# Patient Record
Sex: MALE | Race: WHITE | NOT HISPANIC OR LATINO | Employment: OTHER | URBAN - NONMETROPOLITAN AREA
[De-identification: names, ages, dates, MRNs, and addresses within clinical notes are randomized per-mention and may not be internally consistent; named-entity substitution may affect disease eponyms.]

---

## 2017-01-09 ENCOUNTER — CLINICAL SUPPORT (OUTPATIENT)
Dept: UROLOGY | Facility: CLINIC | Age: 67
End: 2017-01-09

## 2017-01-09 DIAGNOSIS — R79.89 LOW TESTOSTERONE: Primary | ICD-10-CM

## 2017-01-09 DIAGNOSIS — E29.1 HYPOGONADISM IN MALE: ICD-10-CM

## 2017-01-09 PROCEDURE — 96372 THER/PROPH/DIAG INJ SC/IM: CPT | Performed by: UROLOGY

## 2017-01-09 RX ORDER — TESTOSTERONE CYPIONATE 200 MG/ML
400 INJECTION, SOLUTION INTRAMUSCULAR ONCE
Status: COMPLETED | OUTPATIENT
Start: 2017-01-09 | End: 2017-01-09

## 2017-01-09 RX ADMIN — TESTOSTERONE CYPIONATE 400 MG: 200 INJECTION, SOLUTION INTRAMUSCULAR at 09:15

## 2017-01-30 ENCOUNTER — CLINICAL SUPPORT (OUTPATIENT)
Dept: UROLOGY | Facility: CLINIC | Age: 67
End: 2017-01-30

## 2017-01-30 DIAGNOSIS — R79.89 LOW TESTOSTERONE: Primary | ICD-10-CM

## 2017-01-30 DIAGNOSIS — E29.1 HYPOGONADISM IN MALE: ICD-10-CM

## 2017-01-30 PROCEDURE — 96372 THER/PROPH/DIAG INJ SC/IM: CPT | Performed by: UROLOGY

## 2017-01-30 RX ORDER — TESTOSTERONE CYPIONATE 200 MG/ML
400 INJECTION, SOLUTION INTRAMUSCULAR ONCE
Status: COMPLETED | OUTPATIENT
Start: 2017-01-30 | End: 2017-01-30

## 2017-01-30 RX ADMIN — TESTOSTERONE CYPIONATE 400 MG: 200 INJECTION, SOLUTION INTRAMUSCULAR at 11:14

## 2017-02-20 ENCOUNTER — CLINICAL SUPPORT (OUTPATIENT)
Dept: UROLOGY | Facility: CLINIC | Age: 67
End: 2017-02-20

## 2017-02-20 DIAGNOSIS — E29.1 HYPOGONADISM IN MALE: ICD-10-CM

## 2017-02-20 DIAGNOSIS — R79.89 LOW TESTOSTERONE: Primary | ICD-10-CM

## 2017-02-20 PROCEDURE — 96372 THER/PROPH/DIAG INJ SC/IM: CPT | Performed by: UROLOGY

## 2017-02-20 RX ORDER — TESTOSTERONE CYPIONATE 200 MG/ML
400 INJECTION, SOLUTION INTRAMUSCULAR ONCE
Status: COMPLETED | OUTPATIENT
Start: 2017-02-20 | End: 2017-02-20

## 2017-02-20 RX ADMIN — TESTOSTERONE CYPIONATE 400 MG: 200 INJECTION, SOLUTION INTRAMUSCULAR at 09:23

## 2017-03-13 ENCOUNTER — CLINICAL SUPPORT (OUTPATIENT)
Dept: UROLOGY | Facility: CLINIC | Age: 67
End: 2017-03-13

## 2017-03-13 DIAGNOSIS — E29.1 HYPOGONADISM IN MALE: ICD-10-CM

## 2017-03-13 DIAGNOSIS — R79.89 LOW TESTOSTERONE: Primary | ICD-10-CM

## 2017-03-13 PROCEDURE — 96372 THER/PROPH/DIAG INJ SC/IM: CPT | Performed by: UROLOGY

## 2017-03-13 RX ORDER — TESTOSTERONE CYPIONATE 200 MG/ML
400 INJECTION, SOLUTION INTRAMUSCULAR ONCE
Status: COMPLETED | OUTPATIENT
Start: 2017-03-13 | End: 2017-03-13

## 2017-03-13 RX ADMIN — TESTOSTERONE CYPIONATE 400 MG: 200 INJECTION, SOLUTION INTRAMUSCULAR at 08:47

## 2017-04-05 ENCOUNTER — PROCEDURE VISIT (OUTPATIENT)
Dept: UROLOGY | Facility: CLINIC | Age: 67
End: 2017-04-05

## 2017-04-05 DIAGNOSIS — E29.1 HYPOGONADISM IN MALE: ICD-10-CM

## 2017-04-05 DIAGNOSIS — R79.89 LOW TESTOSTERONE: Primary | ICD-10-CM

## 2017-04-05 PROCEDURE — 99213 OFFICE O/P EST LOW 20 MIN: CPT | Performed by: UROLOGY

## 2017-04-05 RX ORDER — HYDROCHLOROTHIAZIDE 12.5 MG/1
12.5 CAPSULE, GELATIN COATED ORAL EVERY MORNING
COMMUNITY
Start: 2017-03-14

## 2017-04-05 RX ORDER — VENLAFAXINE HYDROCHLORIDE 150 MG/1
150 CAPSULE, EXTENDED RELEASE ORAL DAILY
COMMUNITY
Start: 2017-01-14 | End: 2021-12-27

## 2017-04-05 RX ORDER — FLUTICASONE PROPIONATE 50 MCG
SPRAY, SUSPENSION (ML) NASAL
COMMUNITY
Start: 2017-02-17 | End: 2017-08-22

## 2017-04-05 RX ORDER — MONTELUKAST SODIUM 10 MG/1
10 TABLET ORAL NIGHTLY
COMMUNITY
Start: 2017-02-21 | End: 2022-09-06 | Stop reason: SDUPTHER

## 2017-04-05 RX ORDER — MINOCYCLINE HYDROCHLORIDE 100 MG/1
CAPSULE ORAL
COMMUNITY
Start: 2017-03-14 | End: 2017-08-22

## 2017-04-05 RX ORDER — OMEPRAZOLE 20 MG/1
20 CAPSULE, DELAYED RELEASE ORAL DAILY
COMMUNITY
Start: 2017-01-14 | End: 2022-03-15

## 2017-04-05 RX ORDER — TRIAMCINOLONE ACETONIDE 1 MG/G
CREAM TOPICAL
COMMUNITY
Start: 2017-03-14 | End: 2017-08-22

## 2017-04-05 RX ORDER — SIMVASTATIN 20 MG
20 TABLET ORAL NIGHTLY
COMMUNITY
Start: 2017-01-14

## 2017-04-05 NOTE — PROGRESS NOTES
Chief Complaint  Hypogonadism (low testo, fup possible testopel implant.)        SIDDHARTHA Jerry is a 66 y.o. male who turns today for follow-up primarily of hypogonadism and low testosterone level.  He's been having a great clinical benefit from supplement and is anxious to continue.  He has 3 months shy of 5 years after prostatectomy for prostate cancer and a recent PSA was 0.  I suggested this because he has had problems with polycythemia but cannot be a blood donor intill 5 years after his cancer was resolved.  He's also had a problems with estrogen levels being elevated but hasn't taken Arimidex recently.    He is also concerned with erectile dysfunction stating that his intracorporeal injection with Trimix is becoming less effective and is requesting we increase the strength to 30 2 and 30.    There were no vitals filed for this visit.    Past Medical History  Past Medical History:   Diagnosis Date   • Erectile dysfunction    • Hypertension    • Impotence    • Malignant neoplasm of prostate    • Testicular hypofunction        Past Surgical History  Past Surgical History:   Procedure Laterality Date   • APPENDECTOMY     • HEAD/NECK LESION/CYST EXCISION Right 10/31/2016    Procedure: EXCISE BCCA RIGHT SCALP;  Surgeon: Lowell Rider MD;  Location: [facility];  Service:    • PROSTATE SURGERY         Medications  has a current medication list which includes the following prescription(s): aspirin, fluticasone, hydrochlorothiazide, lisinopril, minocycline, montelukast, nortriptyline, omeprazole, simvastatin, simvastatin, tadalafil, triamcinolone, venlafaxine, and venlafaxine xr.      Allergies  No Known Allergies    Social History  Social History     Social History Narrative       Family History  He has no family history of bladder or kidney cancer  He has no family history of kidney stones      AUA Symptom Score:      Review of Systems  Review of Systems   Constitutional: Negative.    Genitourinary: Negative.    All  other systems reviewed and are negative.      Physical Exam  Physical Exam    Labs Recent and today in the office:  Results for orders placed or performed in visit on 11/28/16   POC Urinalysis Dipstick, Automated   Result Value Ref Range    Color Yellow Yellow, Straw, Dark Yellow, Bridgett    Clarity, UA Clear Clear    Glucose, UA Negative Negative mg/dL    Bilirubin Negative Negative    Ketones, UA Trace (A) Negative    Specific Gravity  1.025 1.005 - 1.030    Blood, UA Negative Negative    pH, Urine 6.0 5.0 - 8.0    Protein, POC Negative Negative mg/dL    Urobilinogen, UA Normal Normal    Leukocytes Trace (A) Negative    Nitrite, UA Negative Negative       Testopel Subcutaneous hormone pellet implantation:    The patient was placed on the exam table in the supine position.  The upper outer quadrant of the hip was identified for insertion.  The area was then prepped with Betadine and injected with 7ml of Lidocaine 2% with Epinephrine to anesthetize superficially and then distally along the trocar tract.  A 3mm incision was made using #11 blade scalpel.  The trocar with a sharp-ended stylet was inserted into the subcutaneous tissue in line with the femur.  The sharp stylet was withdrawn and the Testopel pellets were placed into the well of the trocar.  Testopel was advanced into the tissue using blunt-ended stylet.  A total of 12 pellets were inserted. For the J-code of  the NDC # for the pellets is 18945-488-74. The trocar was removed and the incision was closed using Steri-strips.  The wound area was cleansed to remove the Betadine and was covered with Steri-strips with an outer Band-aid.  Two subcutaneous tract were developed.  Careful inspection of the area was done.          Assessment & Plan  The patient was informed of the post-procedure instructions.  He will return in 4 months to determine scheduling of the next insertion.    For therapeutic phlebotomy and will need to be contacted for Arimidex if his  estrogen level is high.

## 2017-04-06 LAB — ESTRADIOL SERPL-MCNC: 15.2 PG/ML (ref 7.6–42.6)

## 2017-08-17 ENCOUNTER — LAB (OUTPATIENT)
Dept: UROLOGY | Facility: CLINIC | Age: 67
End: 2017-08-17

## 2017-08-17 DIAGNOSIS — E29.1 HYPOGONADISM, MALE: Primary | ICD-10-CM

## 2017-08-18 LAB
BASOPHILS # BLD AUTO: 0.01 10*3/MM3 (ref 0–0.2)
BASOPHILS NFR BLD AUTO: 0.2 % (ref 0–2.5)
EOSINOPHIL # BLD AUTO: 0.11 10*3/MM3 (ref 0–0.7)
EOSINOPHIL NFR BLD AUTO: 2.5 % (ref 0–7)
ERYTHROCYTE [DISTWIDTH] IN BLOOD BY AUTOMATED COUNT: 13.8 % (ref 11.5–14.5)
ESTRADIOL SERPL-MCNC: 11 PG/ML (ref 7.6–42.6)
HCT VFR BLD AUTO: 51.5 % (ref 42–52)
HGB BLD-MCNC: 17 G/DL (ref 14–18)
IMM GRANULOCYTES # BLD: 0.02 10*3/MM3 (ref 0–0.06)
IMM GRANULOCYTES NFR BLD: 0.5 % (ref 0–0.6)
LYMPHOCYTES # BLD AUTO: 1.47 10*3/MM3 (ref 0.6–3.4)
LYMPHOCYTES NFR BLD AUTO: 33.2 % (ref 10–50)
MCH RBC QN AUTO: 28.7 PG (ref 27–31)
MCHC RBC AUTO-ENTMCNC: 33 G/DL (ref 30–37)
MCV RBC AUTO: 87 FL (ref 80–94)
MONOCYTES # BLD AUTO: 0.34 10*3/MM3 (ref 0–0.9)
MONOCYTES NFR BLD AUTO: 7.7 % (ref 0–12)
NEUTROPHILS # BLD AUTO: 2.48 10*3/MM3 (ref 2–6.9)
NEUTROPHILS NFR BLD AUTO: 55.9 % (ref 37–80)
NRBC BLD AUTO-RTO: 0 /100 WBC (ref 0–0)
PLATELET # BLD AUTO: 194 10*3/MM3 (ref 130–400)
RBC # BLD AUTO: 5.92 10*6/MM3 (ref 4.7–6.1)
TESTOST SERPL-MCNC: 158 NG/DL (ref 264–916)
WBC # BLD AUTO: 4.43 10*3/MM3 (ref 4.8–10.8)

## 2017-08-22 ENCOUNTER — OFFICE VISIT (OUTPATIENT)
Dept: UROLOGY | Facility: CLINIC | Age: 67
End: 2017-08-22

## 2017-08-22 VITALS — OXYGEN SATURATION: 99 % | WEIGHT: 200 LBS | HEIGHT: 73 IN | BODY MASS INDEX: 26.51 KG/M2

## 2017-08-22 DIAGNOSIS — E29.1 HYPOGONADISM IN MALE: ICD-10-CM

## 2017-08-22 DIAGNOSIS — Z85.46 PERSONAL HISTORY OF PROSTATE CANCER: Primary | ICD-10-CM

## 2017-08-22 LAB
BILIRUB BLD-MCNC: NEGATIVE MG/DL
CLARITY, POC: CLEAR
COLOR UR: YELLOW
GLUCOSE UR STRIP-MCNC: NEGATIVE MG/DL
KETONES UR QL: NEGATIVE
LEUKOCYTE EST, POC: NEGATIVE
NITRITE UR-MCNC: NEGATIVE MG/ML
PH UR: 6 [PH] (ref 5–8)
PROT UR STRIP-MCNC: NEGATIVE MG/DL
PSA SERPL-MCNC: <0.064 NG/ML (ref 0.06–4)
RBC # UR STRIP: NEGATIVE /UL
SP GR UR: 1.03 (ref 1–1.03)
UROBILINOGEN UR QL: NORMAL

## 2017-08-22 PROCEDURE — 81003 URINALYSIS AUTO W/O SCOPE: CPT | Performed by: UROLOGY

## 2017-08-22 PROCEDURE — 11980 IMPLANT HORMONE PELLET(S): CPT | Performed by: UROLOGY

## 2017-08-22 PROCEDURE — 99213 OFFICE O/P EST LOW 20 MIN: CPT | Performed by: UROLOGY

## 2017-08-22 RX ORDER — CLOBETASOL PROPIONATE 0.46 MG/ML
SOLUTION TOPICAL
COMMUNITY
Start: 2017-07-17 | End: 2017-08-22

## 2017-08-22 RX ORDER — AMLODIPINE BESYLATE AND BENAZEPRIL HYDROCHLORIDE 10; 40 MG/1; MG/1
1 CAPSULE ORAL DAILY
COMMUNITY
Start: 2017-06-16

## 2017-08-22 RX ORDER — NORTRIPTYLINE HYDROCHLORIDE 10 MG/1
30 CAPSULE ORAL NIGHTLY
COMMUNITY
Start: 2017-06-06

## 2017-08-22 NOTE — PROGRESS NOTES
Chief Complaint  Hypogonadism (4 month with labs, possible testopel.) and Prostate Cancer (history of prostate cancer.)        SIDDHARTHA Jerry is a 67 y.o. male who returns today for follow-up of hypogonadism and low testosterone level.  He has achieved great clinical benefit from supplement in terms of strength stamina attitude and libido.  He has a past history of prostate cancer treated years ago but with no evidence of recurrence as judged by PSA near 0.  All of his blood work looks good including estradiol CBC and testosterone level indicating it should be minimal risk to continue.  His PSA did not get done so that will be sent today.    Vitals:    08/22/17 1542   SpO2: 99%       Past Medical History  Past Medical History:   Diagnosis Date   • Erectile dysfunction    • Hypertension    • Impotence    • Malignant neoplasm of prostate    • Testicular hypofunction        Past Surgical History  Past Surgical History:   Procedure Laterality Date   • APPENDECTOMY     • HEAD/NECK LESION/CYST EXCISION Right 10/31/2016    Procedure: EXCISE BCCA RIGHT SCALP;  Surgeon: Lowell Rider MD;  Location: [facility];  Service:    • PROSTATE SURGERY         Medications  has a current medication list which includes the following prescription(s): amlodipine-benazepril, aspirin, clobetasol, fluticasone, hydrochlorothiazide, lisinopril, minocycline, montelukast, nortriptyline, omeprazole, simvastatin, tadalafil, triamcinolone, and venlafaxine xr.      Allergies  No Known Allergies    Social History  Social History     Social History Narrative       Family History  He has no family history of bladder or kidney cancer  He has no family history of kidney stones      AUA Symptom Score:      Review of Systems  Review of Systems    Physical Exam  Physical Exam   Constitutional: He is oriented to person, place, and time. He appears well-developed and well-nourished.   HENT:   Head: Normocephalic and atraumatic.   Neck: Normal range of motion.    Pulmonary/Chest: Effort normal. No respiratory distress.   Abdominal: Soft. He exhibits no distension and no mass. There is no tenderness. No hernia.   Musculoskeletal: Normal range of motion.   Lymphadenopathy:     He has no cervical adenopathy.   Neurological: He is alert and oriented to person, place, and time.   Skin: Skin is warm and dry.   Psychiatric: He has a normal mood and affect. His behavior is normal.   Vitals reviewed.      Labs Recent and today in the office:  Results for orders placed or performed in visit on 08/22/17   POC Urinalysis Dipstick, Automated   Result Value Ref Range    Color Yellow Yellow, Straw, Dark Yellow, Bridgett    Clarity, UA Clear Clear    Glucose, UA Negative Negative, 1000 mg/dL (3+) mg/dL    Bilirubin Negative Negative    Ketones, UA Negative Negative    Specific Gravity  1.030 1.005 - 1.030    Blood, UA Negative Negative    pH, Urine 6.0 5.0 - 8.0    Protein, POC Negative Negative mg/dL    Urobilinogen, UA Normal Normal    Leukocytes Negative Negative    Nitrite, UA Negative Negative     Testopel Subcutaneous hormone pellet implantation:    The patient was placed on the exam table in the supine position.  The upper outer quadrant of the hip was identified for insertion.  The area was then prepped with Betadine and injected with 7ml of Lidocaine 2% with Epinephrine to anesthetize superficially and then distally along the trocar tract.  A 3mm incision was made using #11 blade scalpel.  The trocar with a sharp-ended stylet was inserted into the subcutaneous tissue in line with the femur.  The sharp stylet was withdrawn and the Testopel pellets were placed into the well of the trocar.  Testopel was advanced into the tissue using blunt-ended stylet.  A total of 12 pellets were inserted. For the J-code of  the NDC # for the pellets is 65969-331-73. The trocar was removed and the incision was closed using Steri-strips.  The wound area was cleansed to remove the Betadine and was  covered with Steri-strips with an outer Band-aid.  Two subcutaneous tract were developed.  Careful inspection of the area was done.      The patient was informed of the post-procedure instructions.  He will return in 1 month to monitor testosterone levels and then again at 4 months to determine scheduling of the next insertion.          Assessment & Plan  He'll return in 4 months with blood work to consider continuing treatment.

## 2017-12-28 ENCOUNTER — LAB (OUTPATIENT)
Dept: UROLOGY | Facility: CLINIC | Age: 67
End: 2017-12-28

## 2017-12-28 DIAGNOSIS — R35.1 BPH ASSOCIATED WITH NOCTURIA: ICD-10-CM

## 2017-12-28 DIAGNOSIS — E29.1 TESTICULAR HYPOGONADISM: Primary | ICD-10-CM

## 2017-12-28 DIAGNOSIS — N40.1 BPH ASSOCIATED WITH NOCTURIA: ICD-10-CM

## 2017-12-29 LAB
BASOPHILS # BLD AUTO: 0.01 10*3/MM3 (ref 0–0.2)
BASOPHILS NFR BLD AUTO: 0.2 % (ref 0–2.5)
EOSINOPHIL # BLD AUTO: 0.07 10*3/MM3 (ref 0–0.7)
EOSINOPHIL NFR BLD AUTO: 1.4 % (ref 0–7)
ERYTHROCYTE [DISTWIDTH] IN BLOOD BY AUTOMATED COUNT: 13.1 % (ref 11.5–14.5)
ESTRADIOL SERPL-MCNC: 10.6 PG/ML (ref 7.6–42.6)
HCT VFR BLD AUTO: 51.5 % (ref 42–52)
HGB BLD-MCNC: 16.8 G/DL (ref 14–18)
IMM GRANULOCYTES # BLD: 0.02 10*3/MM3 (ref 0–0.06)
IMM GRANULOCYTES NFR BLD: 0.4 % (ref 0–0.6)
LYMPHOCYTES # BLD AUTO: 1.46 10*3/MM3 (ref 0.6–3.4)
LYMPHOCYTES NFR BLD AUTO: 29.2 % (ref 10–50)
MCH RBC QN AUTO: 27.9 PG (ref 27–31)
MCHC RBC AUTO-ENTMCNC: 32.6 G/DL (ref 30–37)
MCV RBC AUTO: 85.5 FL (ref 80–94)
MONOCYTES # BLD AUTO: 0.5 10*3/MM3 (ref 0–0.9)
MONOCYTES NFR BLD AUTO: 10 % (ref 0–12)
NEUTROPHILS # BLD AUTO: 2.94 10*3/MM3 (ref 2–6.9)
NEUTROPHILS NFR BLD AUTO: 58.8 % (ref 37–80)
NRBC BLD AUTO-RTO: 0 /100 WBC (ref 0–0)
PLATELET # BLD AUTO: 188 10*3/MM3 (ref 130–400)
PSA SERPL-MCNC: <0.064 NG/ML (ref 0.06–4)
RBC # BLD AUTO: 6.02 10*6/MM3 (ref 4.7–6.1)
TESTOST SERPL-MCNC: 171 NG/DL (ref 264–916)
WBC # BLD AUTO: 5 10*3/MM3 (ref 4.8–10.8)

## 2018-01-04 ENCOUNTER — LAB (OUTPATIENT)
Dept: LAB | Facility: HOSPITAL | Age: 68
End: 2018-01-04
Attending: UROLOGY

## 2018-01-04 ENCOUNTER — OFFICE VISIT (OUTPATIENT)
Dept: UROLOGY | Facility: CLINIC | Age: 68
End: 2018-01-04

## 2018-01-04 VITALS
HEART RATE: 76 BPM | OXYGEN SATURATION: 98 % | HEIGHT: 73 IN | WEIGHT: 200 LBS | BODY MASS INDEX: 26.51 KG/M2 | DIASTOLIC BLOOD PRESSURE: 74 MMHG | TEMPERATURE: 98.2 F | SYSTOLIC BLOOD PRESSURE: 135 MMHG

## 2018-01-04 DIAGNOSIS — E29.1 HYPOGONADISM MALE: Primary | ICD-10-CM

## 2018-01-04 DIAGNOSIS — D75.1 POLYCYTHEMIA, SECONDARY: Primary | ICD-10-CM

## 2018-01-04 DIAGNOSIS — D75.1 POLYCYTHEMIA: ICD-10-CM

## 2018-01-04 LAB
DEPRECATED RDW RBC AUTO: 40 FL (ref 37–54)
ERYTHROCYTE [DISTWIDTH] IN BLOOD BY AUTOMATED COUNT: 13.1 % (ref 11.5–14.5)
HCT VFR BLD AUTO: 49.1 % (ref 42–52)
HGB BLD-MCNC: 16.4 G/DL (ref 14–18)
MCH RBC QN AUTO: 28.2 PG (ref 27–31)
MCHC RBC AUTO-ENTMCNC: 33.4 G/DL (ref 30–37)
MCV RBC AUTO: 84.5 FL (ref 80–94)
PLATELET # BLD AUTO: 188 10*3/MM3 (ref 130–400)
PMV BLD AUTO: 10.6 FL (ref 6–12)
POST-BLOOD PRESSURE: NORMAL
PRE-BLOOD PRESSURE: NORMAL
PRE-HCT: 49.1
PRE-HGB: 16.4
PULSE: 81
RBC # BLD AUTO: 5.81 10*6/MM3 (ref 4.7–6.1)
VOLUME COLLECTED: 500
WBC NRBC COR # BLD: 6.92 10*3/MM3 (ref 4.8–10.8)

## 2018-01-04 PROCEDURE — 11980 IMPLANT HORMONE PELLET(S): CPT | Performed by: UROLOGY

## 2018-01-04 PROCEDURE — 85027 COMPLETE CBC AUTOMATED: CPT

## 2018-01-04 PROCEDURE — 99195 PHLEBOTOMY: CPT | Performed by: UROLOGY

## 2018-01-04 PROCEDURE — 36415 COLL VENOUS BLD VENIPUNCTURE: CPT

## 2018-01-04 PROCEDURE — 99214 OFFICE O/P EST MOD 30 MIN: CPT | Performed by: UROLOGY

## 2018-01-04 RX ORDER — DOXYCYCLINE 100 MG/1
100 CAPSULE ORAL DAILY
COMMUNITY
Start: 2017-12-08 | End: 2019-03-21

## 2018-01-04 NOTE — PROGRESS NOTES
Chief Complaint  Hypogonadism (testopel)        SIDDHARTHA Jerry is a 67 y.o. male who returns today for follow-up of his hypogonadism and low testosterone level.  He has achieved great clinical benefit in terms of strength and stamina and is anxious to continue the treatments.  He has a past history of prostate cancer but returns today 5 years after therapy once again with a PSA near 0.  He has noticed a drop off in his energy level recently and blood work indicates his testosterone is back down to 100 4 months after insertion of 12 pellets.  Estradiol looks good but hematocrit is 51.    Vitals:    01/04/18 0846   BP: 135/74   Pulse: 76   Temp: 98.2 °F (36.8 °C)   SpO2: 98%       Past Medical History  Past Medical History:   Diagnosis Date   • Erectile dysfunction    • Hypertension    • Impotence    • Malignant neoplasm of prostate    • Testicular hypofunction        Past Surgical History  Past Surgical History:   Procedure Laterality Date   • APPENDECTOMY     • HEAD/NECK LESION/CYST EXCISION Right 10/31/2016    Procedure: EXCISE BCCA RIGHT SCALP;  Surgeon: Lowell Rider MD;  Location: [facility];  Service:    • PROSTATE SURGERY         Medications  has a current medication list which includes the following prescription(s): amlodipine-benazepril, aspirin, doxycycline, hydrochlorothiazide, lisinopril, montelukast, nortriptyline, omeprazole, simvastatin, and venlafaxine xr.      Allergies  No Known Allergies    Social History  Social History     Social History Narrative       Family History  He has no family history of bladder or kidney cancer  He has no family history of kidney stones      AUA Symptom Score:      Review of Systems  Review of Systems   Constitutional: Positive for fatigue.   Genitourinary: Negative.    All other systems reviewed and are negative.      Physical Exam  Physical Exam    Labs Recent and today in the office:  Results for orders placed or performed in visit on 12/28/17   Testosterone    Result Value Ref Range    Testosterone, Total 171 (L) 264 - 916 ng/dL   Estradiol   Result Value Ref Range    Estradiol 10.6 7.6 - 42.6 pg/mL   PSA DIAGNOSTIC   Result Value Ref Range    PSA <0.064 0.060 - 4.000 ng/mL   CBC & Differential   Result Value Ref Range    WBC 5.00 4.80 - 10.80 10*3/mm3    RBC 6.02 4.70 - 6.10 10*6/mm3    Hemoglobin 16.8 14.0 - 18.0 g/dL    Hematocrit 51.5 42.0 - 52.0 %    MCV 85.5 80.0 - 94.0 fL    MCH 27.9 27.0 - 31.0 pg    MCHC 32.6 30.0 - 37.0 g/dL    RDW 13.1 11.5 - 14.5 %    Platelets 188 130 - 400 10*3/mm3    Neutrophil Rel % 58.8 37.0 - 80.0 %    Lymphocyte Rel % 29.2 10.0 - 50.0 %    Monocyte Rel % 10.0 0.0 - 12.0 %    Eosinophil Rel % 1.4 0.0 - 7.0 %    Basophil Rel % 0.2 0.0 - 2.5 %    Neutrophils Absolute 2.94 2.00 - 6.90 10*3/mm3    Lymphocytes Absolute 1.46 0.60 - 3.40 10*3/mm3    Monocytes Absolute 0.50 0.00 - 0.90 10*3/mm3    Eosinophils Absolute 0.07 0.00 - 0.70 10*3/mm3    Basophils Absolute 0.01 0.00 - 0.20 10*3/mm3    Immature Granulocyte Rel % 0.4 0.0 - 0.6 %    Immature Grans Absolute 0.02 0.00 - 0.06 10*3/mm3    nRBC 0.0 0.0 - 0.0 /100 WBC     Testopel Subcutaneous hormone pellet implantation:    The patient was placed on the exam table in the supine position.  The upper outer quadrant of the hip was identified for insertion.  The area was then prepped with Betadine and injected with 7ml of Lidocaine 2% with Epinephrine to anesthetize superficially and then distally along the trocar tract.  A 3mm incision was made using #11 blade scalpel.  The trocar with a sharp-ended stylet was inserted into the subcutaneous tissue in line with the femur.  The sharp stylet was withdrawn and the Testopel pellets were placed into the well of the trocar.  Testopel was advanced into the tissue using blunt-ended stylet.  A total of 12 pellets were inserted. For the J-code of  the NDC # for the pellets is 36715-420-65. The trocar was removed and the incision was closed using  Steri-strips.  The wound area was cleansed to remove the Betadine and was covered with Steri-strips with an outer Band-aid.  Two subcutaneous tract were developed.  Careful inspection of the area was done.            Assessment & Plan  Hypogonadism: 12 pellets are inserted and he is sent for therapeutic phlebotomy because of his hematocrit.  The patient was informed of the post-procedure instructions.  He will return in 1 month to monitor testosterone levels and then again at 4 months to determine scheduling of the next insertion.

## 2018-05-11 ENCOUNTER — PROCEDURE VISIT (OUTPATIENT)
Dept: UROLOGY | Facility: CLINIC | Age: 68
End: 2018-05-11

## 2018-05-11 VITALS
BODY MASS INDEX: 26.51 KG/M2 | TEMPERATURE: 98.5 F | HEIGHT: 73 IN | WEIGHT: 200 LBS | OXYGEN SATURATION: 99 % | HEART RATE: 81 BPM

## 2018-05-11 DIAGNOSIS — C61 MALIGNANT NEOPLASM OF PROSTATE (HCC): ICD-10-CM

## 2018-05-11 DIAGNOSIS — E29.1 HYPOGONADISM IN MALE: ICD-10-CM

## 2018-05-11 DIAGNOSIS — D75.1 POLYCYTHEMIA: ICD-10-CM

## 2018-05-11 DIAGNOSIS — E28.0 ESTRADIOL EXCESS: ICD-10-CM

## 2018-05-11 DIAGNOSIS — R79.89 LOW TESTOSTERONE: Primary | ICD-10-CM

## 2018-05-11 LAB
BILIRUB BLD-MCNC: NEGATIVE MG/DL
CLARITY, POC: CLEAR
COLOR UR: YELLOW
GLUCOSE UR STRIP-MCNC: NEGATIVE MG/DL
KETONES UR QL: NEGATIVE
LEUKOCYTE EST, POC: NEGATIVE
NITRITE UR-MCNC: NEGATIVE MG/ML
PH UR: 6 [PH] (ref 5–8)
PROT UR STRIP-MCNC: NEGATIVE MG/DL
RBC # UR STRIP: NEGATIVE /UL
SP GR UR: 1.01 (ref 1–1.03)
UROBILINOGEN UR QL: NORMAL

## 2018-05-11 PROCEDURE — 99214 OFFICE O/P EST MOD 30 MIN: CPT | Performed by: UROLOGY

## 2018-05-11 PROCEDURE — 81003 URINALYSIS AUTO W/O SCOPE: CPT | Performed by: UROLOGY

## 2018-05-16 ENCOUNTER — PROCEDURE VISIT (OUTPATIENT)
Dept: UROLOGY | Facility: CLINIC | Age: 68
End: 2018-05-16

## 2018-05-16 DIAGNOSIS — E29.1 HYPOGONADISM MALE: Primary | ICD-10-CM

## 2018-05-16 PROCEDURE — 11980 IMPLANT HORMONE PELLET(S): CPT | Performed by: UROLOGY

## 2018-05-16 NOTE — PROGRESS NOTES
Chief Complaint  Hypogonadism (testopel)        SIDDHARTHA Jerry is a 68 y.o. male who turns today for insertion of Testopel pellets to treat his hypogonadism and low testosterone level.    There were no vitals filed for this visit.    Past Medical History  Past Medical History:   Diagnosis Date   • Erectile dysfunction    • Hypertension    • Impotence    • Malignant neoplasm of prostate    • Testicular hypofunction        Past Surgical History  Past Surgical History:   Procedure Laterality Date   • APPENDECTOMY     • HEAD/NECK LESION/CYST EXCISION Right 10/31/2016    Procedure: EXCISE BCCA RIGHT SCALP;  Surgeon: Lowell Rider MD;  Location: [facility];  Service:    • PROSTATE SURGERY         Medications  has a current medication list which includes the following prescription(s): amlodipine-benazepril, aspirin, doxycycline, hydrochlorothiazide, lisinopril, montelukast, nortriptyline, omeprazole, simvastatin, and venlafaxine xr.      Allergies  No Known Allergies    Social History  Social History     Social History Narrative   • No narrative on file       Family History  He has no family history of bladder or kidney cancer  He has no family history of kidney stones      AUA Symptom Score:      Review of Systems  Review of Systems    Physical Exam  Physical Exam    Labs Recent and today in the office:  Results for orders placed or performed in visit on 05/11/18   POC Urinalysis Dipstick, Automated   Result Value Ref Range    Color Yellow Yellow, Straw, Dark Yellow, Bridgett    Clarity, UA Clear Clear    Glucose, UA Negative Negative, 1000 mg/dL (3+) mg/dL    Bilirubin Negative Negative    Ketones, UA Negative Negative    Specific Gravity  1.010 1.005 - 1.030    Blood, UA Negative Negative    pH, Urine 6.0 5.0 - 8.0    Protein, POC Negative Negative mg/dL    Urobilinogen, UA Normal Normal    Leukocytes Negative Negative    Nitrite, UA Negative Negative     Testopel Subcutaneous hormone pellet implantation:    The patient  was placed on the exam table in the supine position.  The upper outer quadrant of the hip was identified for insertion.  The area was then prepped with Betadine and injected with 7ml of Lidocaine 2% with Epinephrine to anesthetize superficially and then distally along the trocar tract.  A 3mm incision was made using #11 blade scalpel.  The trocar with a sharp-ended stylet was inserted into the subcutaneous tissue in line with the femur.  The sharp stylet was withdrawn and the Testopel pellets were placed into the well of the trocar.  Testopel was advanced into the tissue using blunt-ended stylet.  A total of 12 pellets were inserted. For the J-code of  the NDC # for the pellets is 66692-157-31. The trocar was removed and the incision was closed using Steri-strips.  The wound area was cleansed to remove the Betadine and was covered with Steri-strips with an outer Band-aid.  Two subcutaneous tract were developed.  Careful inspection of the area was done.        Assessment & Plan  Hypogonadism:  The patient was informed of the post-procedure instructions.  He will return in 1 month to monitor testosterone levels and then again at 4 months to determine scheduling of the next insertion.

## 2018-08-14 ENCOUNTER — TRANSCRIBE ORDERS (OUTPATIENT)
Dept: CARDIOLOGY | Facility: CLINIC | Age: 68
End: 2018-08-14

## 2018-08-14 DIAGNOSIS — R94.39 ABNORMAL STRESS TEST: Primary | ICD-10-CM

## 2018-08-15 ENCOUNTER — PREP FOR SURGERY (OUTPATIENT)
Dept: OTHER | Facility: HOSPITAL | Age: 68
End: 2018-08-15

## 2018-08-15 DIAGNOSIS — I20.9 ANGINA PECTORIS (HCC): Primary | ICD-10-CM

## 2018-08-15 RX ORDER — ACETAMINOPHEN 325 MG/1
650 TABLET ORAL EVERY 4 HOURS PRN
Status: CANCELLED | OUTPATIENT
Start: 2018-08-15

## 2018-08-15 RX ORDER — ASPIRIN 325 MG
325 TABLET ORAL ONCE
Status: CANCELLED | OUTPATIENT
Start: 2018-08-15 | End: 2018-08-15

## 2018-08-15 RX ORDER — SODIUM CHLORIDE 0.9 % (FLUSH) 0.9 %
1-10 SYRINGE (ML) INJECTION AS NEEDED
Status: CANCELLED | OUTPATIENT
Start: 2018-08-15

## 2018-08-15 RX ORDER — ASPIRIN 325 MG
325 TABLET, DELAYED RELEASE (ENTERIC COATED) ORAL DAILY
Status: CANCELLED | OUTPATIENT
Start: 2018-08-16

## 2018-08-15 RX ORDER — NITROGLYCERIN 0.4 MG/1
0.4 TABLET SUBLINGUAL
Status: CANCELLED | OUTPATIENT
Start: 2018-08-15

## 2018-08-15 RX ORDER — ONDANSETRON 2 MG/ML
4 INJECTION INTRAMUSCULAR; INTRAVENOUS EVERY 6 HOURS PRN
Status: CANCELLED | OUTPATIENT
Start: 2018-08-15

## 2018-08-19 ENCOUNTER — APPOINTMENT (OUTPATIENT)
Dept: PREADMISSION TESTING | Facility: HOSPITAL | Age: 68
End: 2018-08-19

## 2018-08-19 DIAGNOSIS — I20.9 ANGINA PECTORIS (HCC): ICD-10-CM

## 2018-08-19 LAB
ALBUMIN SERPL-MCNC: 4.63 G/DL (ref 3.2–4.8)
ALBUMIN/GLOB SERPL: 2 G/DL (ref 1.5–2.5)
ALP SERPL-CCNC: 75 U/L (ref 25–100)
ALT SERPL W P-5'-P-CCNC: 38 U/L (ref 7–40)
ANION GAP SERPL CALCULATED.3IONS-SCNC: 6 MMOL/L (ref 3–11)
AST SERPL-CCNC: 31 U/L (ref 0–33)
BASOPHILS # BLD AUTO: 0.01 10*3/MM3 (ref 0–0.2)
BASOPHILS NFR BLD AUTO: 0.2 % (ref 0–1)
BILIRUB SERPL-MCNC: 0.5 MG/DL (ref 0.3–1.2)
BUN BLD-MCNC: 19 MG/DL (ref 9–23)
BUN/CREAT SERPL: 15.6 (ref 7–25)
CALCIUM SPEC-SCNC: 9.3 MG/DL (ref 8.7–10.4)
CHLORIDE SERPL-SCNC: 102 MMOL/L (ref 99–109)
CO2 SERPL-SCNC: 29 MMOL/L (ref 20–31)
CREAT BLD-MCNC: 1.22 MG/DL (ref 0.6–1.3)
DEPRECATED RDW RBC AUTO: 47.1 FL (ref 37–54)
EOSINOPHIL # BLD AUTO: 0.07 10*3/MM3 (ref 0–0.3)
EOSINOPHIL NFR BLD AUTO: 1.4 % (ref 0–3)
ERYTHROCYTE [DISTWIDTH] IN BLOOD BY AUTOMATED COUNT: 14.7 % (ref 11.3–14.5)
GFR SERPL CREATININE-BSD FRML MDRD: 59 ML/MIN/1.73
GLOBULIN UR ELPH-MCNC: 2.4 GM/DL
GLUCOSE BLD-MCNC: 99 MG/DL (ref 70–100)
HBA1C MFR BLD: 5.7 % (ref 4.8–5.6)
HCT VFR BLD AUTO: 51 % (ref 38.9–50.9)
HGB BLD-MCNC: 17.2 G/DL (ref 13.1–17.5)
IMM GRANULOCYTES # BLD: 0.01 10*3/MM3 (ref 0–0.03)
IMM GRANULOCYTES NFR BLD: 0.2 % (ref 0–0.6)
LYMPHOCYTES # BLD AUTO: 1.49 10*3/MM3 (ref 0.6–4.8)
LYMPHOCYTES NFR BLD AUTO: 29.9 % (ref 24–44)
MCH RBC QN AUTO: 29.8 PG (ref 27–31)
MCHC RBC AUTO-ENTMCNC: 33.7 G/DL (ref 32–36)
MCV RBC AUTO: 88.2 FL (ref 80–99)
MONOCYTES # BLD AUTO: 0.64 10*3/MM3 (ref 0–1)
MONOCYTES NFR BLD AUTO: 12.8 % (ref 0–12)
NEUTROPHILS # BLD AUTO: 2.78 10*3/MM3 (ref 1.5–8.3)
NEUTROPHILS NFR BLD AUTO: 55.7 % (ref 41–71)
PLATELET # BLD AUTO: 189 10*3/MM3 (ref 150–450)
PMV BLD AUTO: 9.9 FL (ref 6–12)
POTASSIUM BLD-SCNC: 4.4 MMOL/L (ref 3.5–5.5)
PROT SERPL-MCNC: 7 G/DL (ref 5.7–8.2)
RBC # BLD AUTO: 5.78 10*6/MM3 (ref 4.2–5.76)
SODIUM BLD-SCNC: 137 MMOL/L (ref 132–146)
WBC NRBC COR # BLD: 4.99 10*3/MM3 (ref 3.5–10.8)

## 2018-08-19 PROCEDURE — 85025 COMPLETE CBC W/AUTO DIFF WBC: CPT | Performed by: PHYSICIAN ASSISTANT

## 2018-08-19 PROCEDURE — 93010 ELECTROCARDIOGRAM REPORT: CPT | Performed by: INTERNAL MEDICINE

## 2018-08-19 PROCEDURE — 93005 ELECTROCARDIOGRAM TRACING: CPT

## 2018-08-19 PROCEDURE — 83036 HEMOGLOBIN GLYCOSYLATED A1C: CPT | Performed by: PHYSICIAN ASSISTANT

## 2018-08-19 PROCEDURE — 36415 COLL VENOUS BLD VENIPUNCTURE: CPT

## 2018-08-19 PROCEDURE — 80053 COMPREHEN METABOLIC PANEL: CPT | Performed by: PHYSICIAN ASSISTANT

## 2018-08-19 NOTE — DISCHARGE INSTRUCTIONS

## 2018-08-20 ENCOUNTER — HOSPITAL ENCOUNTER (OUTPATIENT)
Facility: HOSPITAL | Age: 68
Discharge: HOME | End: 2018-08-20
Attending: INTERNAL MEDICINE | Admitting: INTERNAL MEDICINE

## 2018-08-20 VITALS
SYSTOLIC BLOOD PRESSURE: 150 MMHG | TEMPERATURE: 97.3 F | DIASTOLIC BLOOD PRESSURE: 83 MMHG | RESPIRATION RATE: 16 BRPM | HEIGHT: 73 IN | WEIGHT: 205.25 LBS | BODY MASS INDEX: 27.2 KG/M2 | OXYGEN SATURATION: 92 % | HEART RATE: 68 BPM

## 2018-08-20 DIAGNOSIS — R94.39 ABNORMAL STRESS TEST: ICD-10-CM

## 2018-08-20 PROBLEM — I10 HYPERTENSION: Status: ACTIVE | Noted: 2018-08-20

## 2018-08-20 PROBLEM — K21.9 GERD (GASTROESOPHAGEAL REFLUX DISEASE): Status: ACTIVE | Noted: 2018-08-20

## 2018-08-20 PROBLEM — E78.00 ELEVATED CHOLESTEROL: Status: ACTIVE | Noted: 2018-08-20

## 2018-08-20 PROBLEM — E78.2 MIXED DYSLIPIDEMIA: Status: ACTIVE | Noted: 2018-08-20

## 2018-08-20 LAB
ARTICHOKE IGE QN: 75 MG/DL (ref 0–130)
CHOLEST SERPL-MCNC: 113 MG/DL (ref 0–200)
HDLC SERPL-MCNC: 34 MG/DL (ref 40–60)
TRIGL SERPL-MCNC: 81 MG/DL (ref 0–150)

## 2018-08-20 PROCEDURE — C1769 GUIDE WIRE: HCPCS | Performed by: INTERNAL MEDICINE

## 2018-08-20 PROCEDURE — 93458 L HRT ARTERY/VENTRICLE ANGIO: CPT | Performed by: INTERNAL MEDICINE

## 2018-08-20 PROCEDURE — 36415 COLL VENOUS BLD VENIPUNCTURE: CPT

## 2018-08-20 PROCEDURE — C1894 INTRO/SHEATH, NON-LASER: HCPCS | Performed by: INTERNAL MEDICINE

## 2018-08-20 PROCEDURE — 25010000002 MIDAZOLAM PER 1 MG: Performed by: INTERNAL MEDICINE

## 2018-08-20 PROCEDURE — 25010000002 HEPARIN (PORCINE) PER 1000 UNITS: Performed by: INTERNAL MEDICINE

## 2018-08-20 PROCEDURE — S0260 H&P FOR SURGERY: HCPCS | Performed by: INTERNAL MEDICINE

## 2018-08-20 PROCEDURE — 0 IOPAMIDOL PER 1 ML: Performed by: INTERNAL MEDICINE

## 2018-08-20 PROCEDURE — 80061 LIPID PANEL: CPT | Performed by: PHYSICIAN ASSISTANT

## 2018-08-20 RX ORDER — MIDAZOLAM HYDROCHLORIDE 1 MG/ML
INJECTION INTRAMUSCULAR; INTRAVENOUS AS NEEDED
Status: DISCONTINUED | OUTPATIENT
Start: 2018-08-20 | End: 2018-08-20 | Stop reason: HOSPADM

## 2018-08-20 RX ORDER — ASPIRIN 325 MG
325 TABLET ORAL ONCE
Status: DISCONTINUED | OUTPATIENT
Start: 2018-08-20 | End: 2018-08-20 | Stop reason: HOSPADM

## 2018-08-20 RX ORDER — ONDANSETRON 2 MG/ML
4 INJECTION INTRAMUSCULAR; INTRAVENOUS EVERY 6 HOURS PRN
Status: DISCONTINUED | OUTPATIENT
Start: 2018-08-20 | End: 2018-08-20 | Stop reason: HOSPADM

## 2018-08-20 RX ORDER — ACETAMINOPHEN 325 MG/1
650 TABLET ORAL EVERY 4 HOURS PRN
Status: DISCONTINUED | OUTPATIENT
Start: 2018-08-20 | End: 2018-08-20 | Stop reason: HOSPADM

## 2018-08-20 RX ORDER — NITROGLYCERIN 0.4 MG/1
0.4 TABLET SUBLINGUAL
Status: DISCONTINUED | OUTPATIENT
Start: 2018-08-20 | End: 2018-08-20 | Stop reason: HOSPADM

## 2018-08-20 RX ORDER — SODIUM CHLORIDE 0.9 % (FLUSH) 0.9 %
1-10 SYRINGE (ML) INJECTION AS NEEDED
Status: DISCONTINUED | OUTPATIENT
Start: 2018-08-20 | End: 2018-08-20 | Stop reason: HOSPADM

## 2018-08-20 RX ORDER — SODIUM CHLORIDE 9 MG/ML
150 INJECTION, SOLUTION INTRAVENOUS CONTINUOUS
Status: ACTIVE | OUTPATIENT
Start: 2018-08-20 | End: 2018-08-20

## 2018-08-20 RX ORDER — ASPIRIN 325 MG
325 TABLET, DELAYED RELEASE (ENTERIC COATED) ORAL DAILY
Status: DISCONTINUED | OUTPATIENT
Start: 2018-08-21 | End: 2018-08-20 | Stop reason: HOSPADM

## 2018-08-20 RX ORDER — LIDOCAINE HYDROCHLORIDE 10 MG/ML
INJECTION, SOLUTION EPIDURAL; INFILTRATION; INTRACAUDAL; PERINEURAL AS NEEDED
Status: DISCONTINUED | OUTPATIENT
Start: 2018-08-20 | End: 2018-08-20 | Stop reason: HOSPADM

## 2018-08-20 RX ORDER — METOPROLOL SUCCINATE 25 MG/1
25 TABLET, EXTENDED RELEASE ORAL DAILY
Qty: 30 TABLET | Refills: 6 | Status: SHIPPED | OUTPATIENT
Start: 2018-08-20 | End: 2021-11-30 | Stop reason: SDUPTHER

## 2018-08-20 RX ORDER — SODIUM CHLORIDE 9 MG/ML
3 INJECTION, SOLUTION INTRAVENOUS CONTINUOUS
Status: DISCONTINUED | OUTPATIENT
Start: 2018-08-20 | End: 2018-08-20

## 2018-08-20 RX ADMIN — SODIUM CHLORIDE 3 ML/KG/HR: 9 INJECTION, SOLUTION INTRAVENOUS at 07:10

## 2018-08-20 NOTE — H&P
[facility]        Date of Hospital Visit: 18      Place of Service: [facility]    Patient Name: José Miguel Jerry  :1950    Referral Provider: Hernandez Jackman,*  Primary Care Provider: Hernandez Jackman MD    Chief complaint/Reason for Consultation:  shortness of breath         Patient Active Problem List    Diagnosis   • Abnormal stress test [R94.39]     Priority: High     Overview Note:     1. [facility] 18:  · Positive EKG changes  · Reversible Inferior ischemia  · EF 60%       • Hypertension [I10]     Priority: Medium   • Mixed dyslipidemia [E78.2]     Priority: Medium   • GERD (gastroesophageal reflux disease) [K21.9]     Priority: Low   • Prostate cancer (CMS/HCC) [C61]   • Low testosterone [E34.9]   • Hypogonadism in male [E29.1]         History of Present Illness:  His is a 68 year old hypertensive dyslipidemic male with no significant prior cardiac history.  He was recently evaluated by his primary care physician with a 4-6 week complaint of exertional dyspnea resolving after approximately 5 minutes of rest.  He did not complain of midsternal chest pressure, diaphoresis, radiating pain or nausea.  He is a lifelong nonsmoker with no prior pulmonary disease.  Anginal equivalent was suspected and a myocardial perfusion study was ordered.  That exam returned showing inferior ischemia with normal LV systolic function.  He is referred or service for cardiac catheterization.  His only previous cardiac testing was a stress test 6-7 years ago which was unremarkable.          Past Medical History:   Diagnosis Date   • Elevated cholesterol    • Erectile dysfunction    • Hypertension    • Wears glasses        Past Surgical History:   Procedure Laterality Date   • APPENDECTOMY     • COLONOSCOPY     • HEAD/NECK LESION/CYST EXCISION Right 10/31/2016    Procedure: EXCISE BCCA RIGHT SCALP;  Surgeon: Lowell Rider MD;  Location: [facility];  Service:     • PROSTATE SURGERY         Allergies   Allergen Reactions   • Chlorhexidine Rash   • Clindamycin/Lincomycin Rash       Prescriptions Prior to Admission   Medication Sig Dispense Refill Last Dose   • amLODIPine-benazepril (LOTREL) 10-40 MG per capsule Take 1 capsule by mouth Daily.   8/20/2018 at Unknown time   • aspirin 325 MG tablet Take 325 mg by mouth daily.   8/20/2018 at Unknown time   • doxycycline (MONODOX) 100 MG capsule Take 100 mg by mouth 1 (One) Time.   8/20/2018 at Unknown time   • hydrochlorothiazide (MICROZIDE) 12.5 MG capsule Take 12.5 mg by mouth Every Morning.   8/20/2018 at Unknown time   •        • montelukast (SINGULAIR) 10 MG tablet Take 10 mg by mouth Every Night.   8/19/2018 at Unknown time   • Multiple Vitamins-Minerals (MULTIVITAMIN ADULTS PO) Take 1 tablet by mouth Daily.   8/20/2018 at Unknown time   • nortriptyline (PAMELOR) 10 MG capsule Take 30 mg by mouth Every Night.   8/19/2018 at Unknown time   • omeprazole (priLOSEC) 20 MG capsule Take 20 mg by mouth Daily.   8/20/2018 at Unknown time   • simvastatin (ZOCOR) 20 MG tablet Take 20 mg by mouth Every Night.   8/19/2018 at Unknown time   • venlafaxine XR (EFFEXOR-XR) 150 MG 24 hr capsule Take 150 mg by mouth Daily.   8/20/2018 at Unknown time         Current Facility-Administered Medications:   •  acetaminophen (TYLENOL) tablet 650 mg, 650 mg, Oral, Q4H PRN, Riki Arita, PA  •  aspirin tablet 325 mg, 325 mg, Oral, Once **AND** [START ON 8/21/2018] aspirin EC tablet 325 mg, 325 mg, Oral, Daily, Riki Arita, PA  •  nitroglycerin (NITROSTAT) SL tablet 0.4 mg, 0.4 mg, Sublingual, Q5 Min PRN, Riki Arita, PA  •  ondansetron (ZOFRAN) injection 4 mg, 4 mg, Intravenous, Q6H PRN, Riki Arita, PA  •  sodium chloride 0.9 % flush 1-10 mL, 1-10 mL, Intravenous, PRN, Riki Arita, PA  •  sodium chloride 0.9 % infusion, 3 mL/kg/hr, Intravenous, Continuous, Anastacia Gary MD, Last Rate: 279.3 mL/hr at 08/20/18 0710,  "3 mL/kg/hr at 08/20/18 0710      Social History     Social History   • Marital status:      Spouse name: N/A   • Number of children: N/A   • Years of education: N/A     Occupational History   •  Retired     Social History Main Topics   • Smoking status: Never Smoker   • Smokeless tobacco: Never Used   • Alcohol use No      Comment: OCCASIONAL   • Drug use: No   • Sexual activity: Yes     Other Topics Concern   • Not on file     Social History Narrative   • No narrative on file       Family History   Problem Relation Age of Onset   • Hypertension Mother    • Heart disease Mother    • Kidney disease Father    • Liver disease Father    • Heart disease Daughter    • Heart disease Maternal Grandfather    • Heart disease Paternal Grandfather        REVIEW OF SYSTEMS:   Review of Systems   Constitution: Negative.   HENT: Negative.    Eyes: Negative.    Cardiovascular: Positive for dyspnea on exertion. Negative for chest pain, claudication, irregular heartbeat, leg swelling, near-syncope, orthopnea, palpitations, paroxysmal nocturnal dyspnea and syncope.   Respiratory: Positive for shortness of breath.    Endocrine: Negative.    Hematologic/Lymphatic: Negative.    Skin: Negative.    Musculoskeletal: Negative.    Gastrointestinal: Negative.    Genitourinary: Negative.    Neurological: Negative.    Psychiatric/Behavioral: Negative.    Allergic/Immunologic: Negative.    All other systems reviewed and are negative.           Objective:  Vitals:    08/20/18 0649 08/20/18 0650 08/20/18 0704   BP: 140/79 140/82    BP Location: Right arm Left arm    Patient Position: Lying Lying    Pulse: 70     Temp: 97.3 °F (36.3 °C)     TempSrc: Temporal Artery      SpO2: 97%     Weight:   93.1 kg (205 lb 4 oz)   Height:   185.4 cm (73\")     Body mass index is 27.08 kg/m².  Flowsheet Rows      First Filed Value   Admission Height  185.4 cm (73\") Documented at 08/20/2018 0704   Admission Weight  93.1 kg (205 lb 4 oz) Documented at " 08/20/2018 0704        No intake or output data in the 24 hours ending 08/20/18 0803    Physical Exam   General: No acute distress, well-developed and well-nourished.    Skin: Skin is warm and dry. No obvious cyanosis, erythema or pallor.   HEENT: Atraumatic, normocephalic, no conjunctival pallor, no scleral icterus.   Neck: Supple, no JVD. Normal carotid upstrokes, no bruits.    Chest:No respiratory distress No chest wall tenderness. Breath sounds are normal. No wheezes,  rhonchi or rales.  Cardiovascular: Normal S1 and S2, no murmer, gallop or rub. PMI is not displaced.    Pulses:Radial and pedal pulses are 2+ and symmetric.    Abdomen: Soft, nontender, normal bowel sounds.   Musculoskeletal/Extremities:  No clubbing, cyanosis or edema. No gross deformity.   Neurological: Alert and oriented to person, place, and time, no gross focal deficits.   Psychiatric: Normal mood and affect.Speech and behavior is normal.    Barbaeu Test:  Left: Normal  (oxymetric Allens) Right: Not Assessed     Lab Review:                  Results from last 7 days  Lab Units 08/19/18  1324   SODIUM mmol/L 137   POTASSIUM mmol/L 4.4   CHLORIDE mmol/L 102   CO2 mmol/L 29.0   BUN mg/dL 19   CREATININE mg/dL 1.22   GLUCOSE mg/dL 99   CALCIUM mg/dL 9.3           Results from last 7 days  Lab Units 08/19/18  1324   WBC 10*3/mm3 4.99   HEMOGLOBIN g/dL 17.2   HEMATOCRIT % 51.0*   PLATELETS 10*3/mm3 189               Results from last 7 days  Lab Units 08/20/18  0703   CHOLESTEROL mg/dL 113   TRIGLYCERIDES mg/dL 81   HDL CHOL mg/dL 34*   LDL CHOL mg/dL 75               EKG:                 Assessment:   · NYHA class III exertional dyspnea, angina equivalent symptoms.  · Abnormal myocardial perfusion study  · Hypertension  · Dyslipidemia    Plan:   · Left heart catheterization possible catheter-based intervention with left wrist approach    Scribed for Anastacia Gary MD by Riki Arita PA-C. 8/20/2018  8:03 AM    nAastacia DOCKERY MD, personally  performed the services described in this documentation as scribed by the above named individual in my presence, and it is both accurate and complete.  8/20/2018  9:27 AM

## 2018-09-17 ENCOUNTER — OFFICE VISIT (OUTPATIENT)
Dept: UROLOGY | Facility: CLINIC | Age: 68
End: 2018-09-17

## 2018-09-17 VITALS
TEMPERATURE: 98 F | HEIGHT: 73 IN | BODY MASS INDEX: 27.17 KG/M2 | OXYGEN SATURATION: 98 % | WEIGHT: 205 LBS | HEART RATE: 68 BPM | DIASTOLIC BLOOD PRESSURE: 74 MMHG | SYSTOLIC BLOOD PRESSURE: 126 MMHG

## 2018-09-17 DIAGNOSIS — E29.1 HYPOGONADISM IN MALE: Primary | ICD-10-CM

## 2018-09-17 DIAGNOSIS — C61 MALIGNANT NEOPLASM OF PROSTATE (HCC): ICD-10-CM

## 2018-09-17 DIAGNOSIS — N52.31 ERECTILE DYSFUNCTION FOLLOWING RADICAL PROSTATECTOMY: Primary | ICD-10-CM

## 2018-09-17 DIAGNOSIS — N52.9 ERECTILE DYSFUNCTION, UNSPECIFIED ERECTILE DYSFUNCTION TYPE: ICD-10-CM

## 2018-09-17 LAB
BILIRUB BLD-MCNC: NEGATIVE MG/DL
CLARITY, POC: CLEAR
COLOR UR: YELLOW
GLUCOSE UR STRIP-MCNC: NEGATIVE MG/DL
KETONES UR QL: NEGATIVE
LEUKOCYTE EST, POC: NEGATIVE
NITRITE UR-MCNC: NEGATIVE MG/ML
PH UR: 6 [PH] (ref 5–8)
PROT UR STRIP-MCNC: NEGATIVE MG/DL
RBC # UR STRIP: NEGATIVE /UL
SP GR UR: 1.02 (ref 1–1.03)
UROBILINOGEN UR QL: NORMAL

## 2018-09-17 PROCEDURE — 99215 OFFICE O/P EST HI 40 MIN: CPT | Performed by: UROLOGY

## 2018-09-17 PROCEDURE — 81003 URINALYSIS AUTO W/O SCOPE: CPT | Performed by: UROLOGY

## 2018-09-17 PROCEDURE — 99214 OFFICE O/P EST MOD 30 MIN: CPT | Performed by: UROLOGY

## 2018-09-17 NOTE — PROGRESS NOTES
"Chief Complaint  ED    HPI  Mr. Jerry is a 68 y.o. male with Hx of PCa, s/p robotic prostatectomy 6 years ago at [place] who w/ presents w/ refractory ED. PSA undetectable.     I have reviewed the patient's medical history in detail and updated the computerized patient record.    He is a patient of my partner Dr. Park, and this is his last note:  \"He is here for follow-up of hypogonadism receiving a great clinical benefit from supplement and anxious to continue.  He last underwent insertion of Testopel pellets 4 months ago and returns today with follow-up blood work to monitor for toxicity and efficacy.  He has a distant history of prostate cancer treated more than 5 years ago with prostatectomy and no evidence of recurrence as judged by PSA consistently near 0.  He denies difficulty voiding but does have significant problems with erectile dysfunction and is using the max dose of a high concentration Trimix.  Other parameters for toxicity looked good including estradiol and CBC always encouraged to be a blood donor to avoid polycythemia.\"    He has tried multiple oral PDE5i's, TEETEE, and ICI (super trimix most recently) in the past  His erection has started to wane quickly w/ superTrimix.     He is very interested in IPP and presents to me to discuss this    Review of Systems  Constitutional: No fevers or chills  Skin: Negative for rash  Endocrine: No heat/cold intolerance   Gastrointestinal: Negative for constipation, nausea or vomiting  Genitourinary: Negative for new lower urinary tract symptoms, gross hematuria or dysuria.  Musculoskeletal: Negative for flank pain  Neurological:  Negative for frequent headaches or dizziness  Lymph/Heme: Negative for leg swelling or calf pain    Physical Exam  There were no vitals taken for this visit.  Constitutional: NAD, WDWN.  Cardiovascular: Regular rate.  Pulmonary/Chest: Respirations are even and non-labored bilaterally.  Abdominal: Soft. No distension, tenderness, " masses or guarding. No CVA tenderness. + abdominal scars  Extremities: SUDHA x 4, Warm. No clubbing.  No cyanosis.    Skin: Pink, warm and dry.  No rashes noted.    Genitourinary  Penis: circumcised penis, glans normal, no penile discharge.  No rashes/lesions. 5-6 in stretched penile length w/ glans much larger than shaft. No palpable plaques.   Testes: descended bilaterally, no masses, nontender to palpation. Remainder of scrotal contents normal. No hernia appreciated.  Perineum:  No perineal pain with palpation.    He denies any penile curvature.    Labs  Lab Results   Component Value Date    PSA <0.064 05/08/2018    PSA <0.064 12/28/2017    PSA <0.064 08/22/2017       Radiographic Studies  No radiology results for the last 30 days.    Assessment  68 y.o. male with Hx of PCa, s/p robotic prostatectomy 6 years ago at [place] who w/ presents w/ refractory ED. PSA undetectable.  He follows with my partner Dr. Park for TRT.  He presents to me today to discuss refractory ED and discuss penile prosthesis.    We had a very long discussion today about penile prosthesis and its role and erectile dysfunction.  I informed him of the technique, the different models with their pros and cons, and the risks, benefits, and alternatives to surgical therapy for erectile dysfunction.  We discussed specifically tailoring his procedure with an ectopic reservoir since he has had a history of prior prostatectomy.  I also informed him of the usage of the no touch technique to reduce his chance of surgical infection from 2% to 0.4%.  He asked many thoughtful questions which I answered to his satisfaction. He is 90% sure he wants to have IPP performed, but wants to think about this some more and I suggested he return on the day of his palate insertion and bring his wife to have further discussion about penile prosthesis.     I spent a total of 45 minutes with the patient, with >50% of the time spent in face-to-face interaction, engaging  in counseling on the risks, benefits, and alternatives of the therapy and coordinating care.     Plan  1.  Follow-up for further IPP discussion on the same day that Dr. Park will be inserting testosterone pellets    No Follow-up on file.    Nick Sommer MD

## 2018-09-17 NOTE — PROGRESS NOTES
Chief Complaint  Hypogonadism (4 months with labs.)        SIDDHARTHA Jerry is a 68 y.o. male who returns today for follow-up of hypogonadism receiving a great clinical benefit from supplement and anxious to continue.  He last underwent insertion of Testopel pellets 4 months ago and returns today with follow-up blood work to monitor for toxicity and efficacy.  He has a distant history of prostate cancer treated more than 5 years ago with prostatectomy and no evidence of recurrence as judged by PSA consistently near 0.  He denies difficulty voiding but does have significant problems with erectile dysfunction and is using the max dose of a high concentration Trimix.  Other parameters for toxicity looked good including estradiol and CBC always encouraged to be a blood donor to avoid polycythemia.    Vitals:    09/17/18 0844   BP: 126/74   Pulse: 68   Temp: 98 °F (36.7 °C)   SpO2: 98%       Past Medical History  Past Medical History:   Diagnosis Date   • Elevated cholesterol    • Erectile dysfunction    • Hypertension    • Wears glasses        Past Surgical History  Past Surgical History:   Procedure Laterality Date   • APPENDECTOMY     • CARDIAC CATHETERIZATION N/A 8/20/2018    Procedure: Left Heart Cath;  Surgeon: Anastacia Gary MD;  Location:  [facility];  Service: Cardiology   • COLONOSCOPY     • HEAD/NECK LESION/CYST EXCISION Right 10/31/2016    Procedure: EXCISE BCCA RIGHT SCALP;  Surgeon: Lowell Rider MD;  Location:  [facility];  Service:    • PROSTATE SURGERY         Medications  has a current medication list which includes the following prescription(s): amlodipine-benazepril, aspirin, hydrochlorothiazide, metoprolol succinate xl, montelukast, multiple vitamins-minerals, nortriptyline, omeprazole, simvastatin, venlafaxine xr, and doxycycline.      Allergies  Allergies   Allergen Reactions   • Chlorhexidine Rash   • Clindamycin/Lincomycin Rash       Social History  Social History     Social  History Narrative   • No narrative on file       Family History  He has no family history of bladder or kidney cancer  He has no family history of kidney stones      AUA Symptom Score:      Review of Systems  Review of Systems   Constitutional: Negative.    Genitourinary: Negative.    All other systems reviewed and are negative.      Physical Exam  Physical Exam    Labs Recent and today in the office:  Results for orders placed or performed in visit on 09/17/18   POC Urinalysis Dipstick, Automated   Result Value Ref Range    Color Yellow Yellow, Straw, Dark Yellow, Bridgett    Clarity, UA Clear Clear    Specific Gravity  1.025 1.005 - 1.030    pH, Urine 6.0 5.0 - 8.0    Leukocytes Negative Negative    Nitrite, UA Negative Negative    Protein, POC Negative Negative mg/dL    Glucose, UA Negative Negative, 1000 mg/dL (3+) mg/dL    Ketones, UA Negative Negative    Urobilinogen, UA Normal Normal    Bilirubin Negative Negative    Blood, UA Negative Negative         Assessment & Plan  #1 hypogonadism: He is responded well to supplement and is scheduled for his next batch of Testopel pellets.    #2 polycythemia: Currently his hematocrit is 49 and this will need to continue to be monitored.  If it exceeds 52, recommending therapeutic phlebotomy.  It's been 5 years since his prostate cancer so he may be a candidate for blood donor at this point.    #3 erectile dysfunction: Actually his chief complaint today stating that despite the testosterone and Trimix injections he is struggling.  I suggested he discuss a penile prosthesis with Dr. Sommer and he is referred.

## 2018-10-01 ENCOUNTER — PROCEDURE VISIT (OUTPATIENT)
Dept: UROLOGY | Facility: CLINIC | Age: 68
End: 2018-10-01

## 2018-10-01 DIAGNOSIS — E29.1 HYPOGONADISM MALE: Primary | ICD-10-CM

## 2018-10-01 PROCEDURE — 11980 IMPLANT HORMONE PELLET(S): CPT | Performed by: UROLOGY

## 2018-10-01 NOTE — PROGRESS NOTES
Chief Complaint  Hypogonadism (testopel)        SIDDHARTHA Jerry is a 68 y.o. male who returns today for follow-up of his hypogonadism being treated with Testopel implants.    There were no vitals filed for this visit.    Past Medical History  Past Medical History:   Diagnosis Date   • Elevated cholesterol    • Erectile dysfunction    • Hypertension    • Prostate cancer (CMS/HCC)    • Wears glasses        Past Surgical History  Past Surgical History:   Procedure Laterality Date   • APPENDECTOMY     • CARDIAC CATHETERIZATION N/A 8/20/2018    Procedure: Left Heart Cath;  Surgeon: Anastacia Gary MD;  Location:  [facility];  Service: Cardiology   • COLONOSCOPY     • HEAD/NECK LESION/CYST EXCISION Right 10/31/2016    Procedure: EXCISE BCCA RIGHT SCALP;  Surgeon: Lowell Rider MD;  Location:  [facility];  Service:    • PROSTATE SURGERY         Medications  has a current medication list which includes the following prescription(s): amlodipine-benazepril, aspirin, doxycycline, hydrochlorothiazide, metoprolol succinate xl, montelukast, multiple vitamins-minerals, nortriptyline, omeprazole, simvastatin, and venlafaxine xr.      Allergies  Allergies   Allergen Reactions   • Chlorhexidine Rash   • Clindamycin/Lincomycin Rash       Social History  Social History     Social History Narrative   • No narrative on file       Family History  He has no family history of bladder or kidney cancer  He has no family history of kidney stones      AUA Symptom Score:      Review of Systems  Review of Systems    Physical Exam  Physical Exam    Labs Recent and today in the office:  Results for orders placed or performed in visit on 09/17/18   POC Urinalysis Dipstick, Automated   Result Value Ref Range    Color Yellow Yellow, Straw, Dark Yellow, Bridgett    Clarity, UA Clear Clear    Specific Gravity  1.025 1.005 - 1.030    pH, Urine 6.0 5.0 - 8.0    Leukocytes Negative Negative    Nitrite, UA Negative Negative    Protein, POC  Negative Negative mg/dL    Glucose, UA Negative Negative, 1000 mg/dL (3+) mg/dL    Ketones, UA Negative Negative    Urobilinogen, UA Normal Normal    Bilirubin Negative Negative    Blood, UA Negative Negative     Testopel Subcutaneous hormone pellet implantation:    The patient was placed on the exam table in the supine position.  The upper outer quadrant of the hip was identified for insertion.  The area was then prepped with Betadine and injected with 7ml of Lidocaine 2% with Epinephrine to anesthetize superficially and then distally along the trocar tract.  A 3mm incision was made using #11 blade scalpel.  The trocar with a sharp-ended stylet was inserted into the subcutaneous tissue in line with the femur.  The sharp stylet was withdrawn and the Testopel pellets were placed into the well of the trocar.  Testopel was advanced into the tissue using blunt-ended stylet.  A total of 12 pellets were inserted. For the J-code of  the NDC # for the pellets is 21185-786-73. The trocar was removed and the incision was closed using Steri-strips.  The wound area was cleansed to remove the Betadine and was covered with Steri-strips with an outer Band-aid.  Two subcutaneous tract were developed.  Careful inspection of the area was done.          Assessment & Plan  #1 hypogonadism: The patient was informed of the post-procedure instructions.  He will return in 1 month to monitor testosterone levels and then again at 4 months to determine scheduling of the next insertion.

## 2018-10-08 ENCOUNTER — OFFICE VISIT (OUTPATIENT)
Dept: UROLOGY | Facility: CLINIC | Age: 68
End: 2018-10-08

## 2018-10-08 VITALS
HEART RATE: 80 BPM | WEIGHT: 205 LBS | OXYGEN SATURATION: 97 % | HEIGHT: 73 IN | TEMPERATURE: 97.3 F | BODY MASS INDEX: 27.17 KG/M2

## 2018-10-08 DIAGNOSIS — N52.31 ERECTILE DYSFUNCTION AFTER RADICAL PROSTATECTOMY: Primary | ICD-10-CM

## 2018-10-08 PROCEDURE — 99214 OFFICE O/P EST MOD 30 MIN: CPT | Performed by: UROLOGY

## 2018-10-08 NOTE — PROGRESS NOTES
"Chief Complaint  ED    HPI  Mr. Jerry is a 68 y.o. male with Hx of PCa, s/p robotic prostatectomy 6 years ago at [facility] who w/ presents w/ refractory ED. PSA undetectable.     He presents today for further discussion of IPP, he has brought his wife with him and a list of questions, pertaining to the procedure. They both feel comfortable with the idea of the procedure and voice their understanding of what a penile prosthesis is and he demonstrates with a model that he has researched how to use it.        To review,   I have reviewed the patient's medical history in detail and updated the computerized patient record.  He is a patient of my partner Dr. Park, and this is his last note:  \"He is here for follow-up of hypogonadism receiving a great clinical benefit from supplement and anxious to continue.  He last underwent insertion of Testopel pellets 4 months ago and returns today with follow-up blood work to monitor for toxicity and efficacy.  He has a distant history of prostate cancer treated more than 5 years ago with prostatectomy and no evidence of recurrence as judged by PSA consistently near 0.  He denies difficulty voiding but does have significant problems with erectile dysfunction and is using the max dose of a high concentration Trimix.  Other parameters for toxicity looked good including estradiol and CBC always encouraged to be a blood donor to avoid polycythemia.\"  He has tried multiple oral PDE5i's, TEETEE, and ICI (super trimix most recently) in the past  His erection has started to wane quickly w/ superTrimix.   He is very interested in IPP and presents to me to discuss this    Review of Systems  Constitutional: No fevers or chills  Skin: Negative for rash  Endocrine: No heat/cold intolerance   Gastrointestinal: Negative for constipation, nausea or vomiting  Genitourinary: Negative for new lower urinary tract symptoms, gross hematuria or dysuria.  Musculoskeletal: Negative for flank " "pain  Neurological:  Negative for frequent headaches or dizziness  Lymph/Heme: Negative for leg swelling or calf pain    Physical Exam  Visit Vitals  Pulse 80   Temp 97.3 °F (36.3 °C)   Ht 185.4 cm (72.99\")   Wt 93 kg (205 lb)   SpO2 97%   BMI 27.05 kg/m²     Constitutional: NAD, WDWN.  Cardiovascular: Regular rate.  Pulmonary/Chest: Respirations are even and non-labored bilaterally.  Abdominal: Soft. No distension, tenderness, masses or guarding. No CVA tenderness. + abdominal scars  Extremities: SUDHA x 4, Warm. No clubbing.  No cyanosis.    Skin: Pink, warm and dry.  No rashes noted.    Genitourinary  Penis: circumcised penis, glans normal, no penile discharge.  No rashes/lesions. 5-6 in stretched penile length w/ glans much larger than shaft. No palpable plaques.   Testes: descended bilaterally, no masses, nontender to palpation. Remainder of scrotal contents normal. No hernia appreciated.  Perineum:  No perineal pain with palpation.    He denies any penile curvature.    Labs  Lab Results   Component Value Date    PSA <0.064 05/08/2018    PSA <0.064 12/28/2017    PSA <0.064 08/22/2017       Radiographic Studies  No radiology results for the last 30 days.    Assessment  68 y.o. male with Hx of PCa, s/p robotic prostatectomy 6 years ago at [place] who w/ presents w/ refractory ED. PSA undetectable.  He follows with my partner Dr. Park for TRT.  He presents to me today to discuss refractory ED and discuss penile prosthesis.    I had another long discussion with this patient about an IPP placement.  The risks were explained.  The infection rate is about 2 - 3%.    If an infection were to occur, the prosthesis would need to be removed.  Depending on the the severity of the infection, a prosthesis may or may not be able to be replaced in the same setting.  Delayed replacement of a prosthesis is more difficult and is occasionally not possible.  Like any other mechanical device, mechanical dysfunction can occur.  If " this were to happen, revision surgery would be necessary.      Like any other surgical procedure, injury to surrounding organs is possible during the surgery.  Known surrounding organ injuries include the urethra and the bladder.  If a urethral injury were to occur, the procedure would need to be abandoned until the urethra has healed.      Cardiovascular risks such as an MI or PE are not uncommon to any surgical procedure requiring general or regional anesthesia.  I have asked this patient to see preoperative testing for labs and imaging.  All questions were answered and patient wants to proceed in this manner.    I spent a total of 30 minutes with the patient, with >50% of the time spent in face-to-face interaction, engaging in counseling on the risks, benefits, and alternatives of the therapy and coordinating care.     Plan  1.  Plan for IPP on 11/28, ectopic reservoir placement d/t Hx of RARP (no past hernia Sx)    No Follow-up on file.    Nick Sommer MD

## 2018-10-13 ENCOUNTER — RESULTS ENCOUNTER (OUTPATIENT)
Dept: UROLOGY | Facility: CLINIC | Age: 68
End: 2018-10-13

## 2018-10-13 DIAGNOSIS — N52.31 ERECTILE DYSFUNCTION AFTER RADICAL PROSTATECTOMY: ICD-10-CM

## 2018-10-19 NOTE — PROGRESS NOTES
Encounter Date:10/23/2018      Patient ID: José Miguel Jerry is a 68 y.o. male resident of [place], [place].    Chief Complaint: Hypertension and Hyperlipidemia      PROBLEM LIST:  1. Abnormal Stress Test  a. MPS (8/8/2018): positive EKG changes. Reversible inferior ischemia. LVEF 60%.  b. Cardiac cath (8/20/2018): nonobstructive plaque disease involving multiple vessels without evidence of hemodynamically significant coronary artery disease.  2. Hypertension   3. Mixed dyslipidemia   4. GERD  5. Prostate cancer  6. Low testosterone  7. Hypogonadism in male    History of Present Illness     Patient presents today for 2 month hospital follow-up with a history of cardiac catheterization, abnormal stress test, and cardiac risk factors. Mr. Jerry denies chest pain, shortness of breath, leg swelling, palpitations, and syncope. Remains busy and active with walking on a treadmill or cross-  30 minutes per day.  His blood pressures has been well- controlled at home in the 130/70 range.  He was placed on Metoprolol following his cath and has had no side effects. He is compliant with all of his medications.    Allergies   Allergen Reactions   • Chlorhexidine Rash   • Clindamycin/Lincomycin Rash         Current Outpatient Prescriptions:   •  amLODIPine-benazepril (LOTREL) 10-40 MG per capsule, Take 1 capsule by mouth Daily., Disp: , Rfl:   •  aspirin 325 MG tablet, Take 325 mg by mouth daily., Disp: , Rfl:   •  doxycycline (MONODOX) 100 MG capsule, Take 100 mg by mouth 1 (One) Time., Disp: , Rfl:   •  hydrochlorothiazide (MICROZIDE) 12.5 MG capsule, Take 12.5 mg by mouth Every Morning., Disp: , Rfl:   •  metoprolol succinate XL (TOPROL-XL) 25 MG 24 hr tablet, Take 1 tablet by mouth Daily., Disp: 30 tablet, Rfl: 6  •  montelukast (SINGULAIR) 10 MG tablet, Take 10 mg by mouth Every Night., Disp: , Rfl:   •  Multiple Vitamins-Minerals (MULTIVITAMIN ADULTS PO), Take 1 tablet by mouth Daily., Disp: , Rfl:   •   "nortriptyline (PAMELOR) 10 MG capsule, Take 30 mg by mouth Every Night., Disp: , Rfl:   •  omeprazole (priLOSEC) 20 MG capsule, Take 20 mg by mouth Daily., Disp: , Rfl:   •  simvastatin (ZOCOR) 20 MG tablet, Take 20 mg by mouth Every Night., Disp: , Rfl:   •  venlafaxine XR (EFFEXOR-XR) 150 MG 24 hr capsule, Take 150 mg by mouth Daily., Disp: , Rfl:     The following portions of the patient's history were reviewed and updated as appropriate: allergies, current medications, past family history, past medical history, past social history, past surgical history and problem list.    ROS  Review of Systems   Constitution: Negative for chills, fever, weight gain and weight loss.   Cardiovascular: Negative for chest pain, claudication, dyspnea on exertion, leg swelling, orthopnea, palpitations, paroxysmal nocturnal dyspnea and syncope.        No dizziness   Gastrointestinal: Negative for abdominal pain, constipation, diarrhea, nausea and vomiting.   Genitourinary:        No urinary symptoms   Neurological:        No symptoms of stroke.   All other systems reviewed and are negative.    Objective:     Blood pressure 112/56, pulse 73, height 185.4 cm (73\"), weight 95.7 kg (211 lb).       Physical Exam  Constitutional: She appears well-developed and well-nourished.   HENT:   HEENT exam unremarkable.   Neck: Neck supple. No JVD present.   No carotid bruits.   Cardiovascular: Normal rate, regular rhythm and normal heart sounds.    No murmur heard.  2 plus symmetric pulses.   Pulmonary/Chest: Breath sounds normal. Does not exhibit tenderness.   Abdominal:   Abdomen benign.   Musculoskeletal: Does not exhibit edema.   Neurological:   Neurological exam unremarkable.   Vitals reviewed.    Lab Review:     Lab Results   Component Value Date    CHOL 113 08/20/2018    TRIG 81 08/20/2018    HDL 34 (L) 08/20/2018    LDL 75 08/20/2018     Lab Results   Component Value Date    GLUCOSE 99 08/19/2018    BUN 19 08/19/2018    CREATININE 1.22 " 08/19/2018    EGFRIFNONA 59 (L) 08/19/2018    BCR 15.6 08/19/2018    K 4.4 08/19/2018    CO2 29.0 08/19/2018    CALCIUM 9.3 08/19/2018    ALBUMIN 4.63 08/19/2018    AST 31 08/19/2018    ALT 38 08/19/2018     Lab Results   Component Value Date    WBC 4.99 08/19/2018    HGB 16.3 09/12/2018    HCT 49.2 09/12/2018    MCV 89.9 09/12/2018     09/12/2018     No results found for: TSH  Lab Results   Component Value Date    HGBA1C 5.70 (H) 08/19/2018       Procedures       Assessment:      Diagnosis Plan   1. Coronary artery disease involving native coronary artery of native heart without angina pectoris  Non-obstructive- medical management   2. Essential hypertension  Well- controlled   3. Mixed hyperlipidemia  Statin     Plan:   Stablle cardiac status.  Continue current medications BB, ASA and statin  FU in 12 MO, sooner as needed.  Thank you for allowing us to participate in the care of your patient.     Scribed for Anastacia Gary MD by Areli Bobo RN BSN. 10/23/2018  9:54 AM     I, Anastacia Gary MD, personally performed the services described in this documentation as scribed by the above named individual in my presence, and it is both accurate and complete.  10/23/2018  10:28 AM            Please note that portions of this note may have been completed with a voice recognition program. Efforts were made to edit the dictations, but occasionally words are mistranscribed.

## 2018-10-23 ENCOUNTER — OFFICE VISIT (OUTPATIENT)
Dept: CARDIOLOGY | Facility: CLINIC | Age: 68
End: 2018-10-23

## 2018-10-23 VITALS
HEIGHT: 73 IN | DIASTOLIC BLOOD PRESSURE: 56 MMHG | HEART RATE: 73 BPM | WEIGHT: 211 LBS | BODY MASS INDEX: 27.96 KG/M2 | SYSTOLIC BLOOD PRESSURE: 112 MMHG

## 2018-10-23 DIAGNOSIS — I25.10 CORONARY ARTERY DISEASE INVOLVING NATIVE CORONARY ARTERY OF NATIVE HEART WITHOUT ANGINA PECTORIS: Primary | ICD-10-CM

## 2018-10-23 DIAGNOSIS — E78.2 MIXED HYPERLIPIDEMIA: ICD-10-CM

## 2018-10-23 DIAGNOSIS — I10 ESSENTIAL HYPERTENSION: ICD-10-CM

## 2018-10-23 PROCEDURE — 99214 OFFICE O/P EST MOD 30 MIN: CPT | Performed by: INTERNAL MEDICINE

## 2018-11-26 ENCOUNTER — HOSPITAL ENCOUNTER (OUTPATIENT)
Dept: GENERAL RADIOLOGY | Facility: HOSPITAL | Age: 68
Discharge: HOME | End: 2018-11-26
Admitting: UROLOGY

## 2018-11-26 ENCOUNTER — APPOINTMENT (OUTPATIENT)
Dept: PREADMISSION TESTING | Facility: HOSPITAL | Age: 68
End: 2018-11-26

## 2018-11-26 VITALS
WEIGHT: 205 LBS | HEART RATE: 65 BPM | SYSTOLIC BLOOD PRESSURE: 156 MMHG | DIASTOLIC BLOOD PRESSURE: 77 MMHG | OXYGEN SATURATION: 94 % | BODY MASS INDEX: 27.17 KG/M2 | HEIGHT: 73 IN | RESPIRATION RATE: 18 BRPM

## 2018-11-26 DIAGNOSIS — N52.31 ERECTILE DYSFUNCTION AFTER RADICAL PROSTATECTOMY: ICD-10-CM

## 2018-11-26 DIAGNOSIS — N52.9 ERECTILE DYSFUNCTION, UNSPECIFIED ERECTILE DYSFUNCTION TYPE: Primary | ICD-10-CM

## 2018-11-26 LAB
ANION GAP SERPL CALCULATED.3IONS-SCNC: 10.6 MMOL/L (ref 10–20)
BILIRUB UR QL STRIP: NEGATIVE
BUN BLD-MCNC: 16 MG/DL (ref 7–20)
BUN/CREAT SERPL: 16 (ref 6.3–21.9)
CALCIUM SPEC-SCNC: 9.4 MG/DL (ref 8.4–10.2)
CHLORIDE SERPL-SCNC: 103 MMOL/L (ref 98–107)
CLARITY UR: CLEAR
CO2 SERPL-SCNC: 29 MMOL/L (ref 26–30)
COLOR UR: YELLOW
CREAT BLD-MCNC: 1 MG/DL (ref 0.6–1.3)
DEPRECATED RDW RBC AUTO: 49.9 FL (ref 37–54)
ERYTHROCYTE [DISTWIDTH] IN BLOOD BY AUTOMATED COUNT: 14.9 % (ref 11.5–14.5)
GFR SERPL CREATININE-BSD FRML MDRD: 74 ML/MIN/1.73
GLUCOSE BLD-MCNC: 99 MG/DL (ref 74–98)
GLUCOSE UR STRIP-MCNC: NEGATIVE MG/DL
HCT VFR BLD AUTO: 50.4 % (ref 42–52)
HGB BLD-MCNC: 17 G/DL (ref 14–18)
HGB UR QL STRIP.AUTO: NEGATIVE
KETONES UR QL STRIP: NEGATIVE
LEUKOCYTE ESTERASE UR QL STRIP.AUTO: NEGATIVE
MCH RBC QN AUTO: 30.6 PG (ref 27–31)
MCHC RBC AUTO-ENTMCNC: 33.7 G/DL (ref 30–37)
MCV RBC AUTO: 90.8 FL (ref 80–94)
NITRITE UR QL STRIP: NEGATIVE
PH UR STRIP.AUTO: 6 [PH] (ref 5–8)
PLATELET # BLD AUTO: 191 10*3/MM3 (ref 130–400)
PMV BLD AUTO: 10.2 FL (ref 6–12)
POTASSIUM BLD-SCNC: 4.6 MMOL/L (ref 3.5–5.1)
PROT UR QL STRIP: NEGATIVE
RBC # BLD AUTO: 5.55 10*6/MM3 (ref 4.7–6.1)
SODIUM BLD-SCNC: 138 MMOL/L (ref 137–145)
SP GR UR STRIP: >=1.03 (ref 1–1.03)
UROBILINOGEN UR QL STRIP: NORMAL
WBC NRBC COR # BLD: 5.44 10*3/MM3 (ref 4.8–10.8)

## 2018-11-26 PROCEDURE — 85027 COMPLETE CBC AUTOMATED: CPT | Performed by: UROLOGY

## 2018-11-26 PROCEDURE — 36415 COLL VENOUS BLD VENIPUNCTURE: CPT

## 2018-11-26 PROCEDURE — 71045 X-RAY EXAM CHEST 1 VIEW: CPT

## 2018-11-26 PROCEDURE — 93005 ELECTROCARDIOGRAM TRACING: CPT | Performed by: UROLOGY

## 2018-11-26 PROCEDURE — 80048 BASIC METABOLIC PNL TOTAL CA: CPT | Performed by: UROLOGY

## 2018-11-26 PROCEDURE — 81003 URINALYSIS AUTO W/O SCOPE: CPT | Performed by: UROLOGY

## 2018-11-26 RX ORDER — GABAPENTIN 300 MG/1
300 CAPSULE ORAL 3 TIMES DAILY
Qty: 30 CAPSULE | Refills: 0 | Status: SHIPPED | OUTPATIENT
Start: 2018-11-26 | End: 2018-12-06

## 2018-11-26 RX ORDER — SULFAMETHOXAZOLE AND TRIMETHOPRIM 800; 160 MG/1; MG/1
1 TABLET ORAL 2 TIMES DAILY
Qty: 16 TABLET | Refills: 0 | Status: SHIPPED | OUTPATIENT
Start: 2018-11-26 | End: 2018-12-04

## 2018-11-26 NOTE — PAT
JOY HERNANDEZ NOTIFIED OF PATIENT'S CARIDAC CATH IN 8/2018, RECENT F/U NOTE FROM CARDIOLOGIST DR JORDAN IN [place] ON 10/23/18 AND NOTIFIES HIM THAT PATIENT DENIES ANY HEART RELATED COMPLAINTS SINCE CATH. PATIENT REPORTS THE CARDIAC CATH WAS DONE DUT TO FAMILY HISTORY OF CARDIAC ISSUES AND PATIENT'S REPORTED SHORTNESS OF BREATH WHEN EXERCISING. PATIENT REPORTS HE HAD AN ECHO AND STRESS TEST DONE JUST PRIOR TO HEART CATH. PER JOY, PLEASE GET CARDIAC CLEARANCE NOTE FROM PATIENT'S CARDIOLOGIST AND HAVE TEST RESULTS ON CHART. ORDERS CARRIED OUT.

## 2018-11-27 RX ORDER — GENTAMICIN SULFATE 80 MG/100ML
80 INJECTION, SOLUTION INTRAVENOUS ONCE
Status: CANCELLED | OUTPATIENT
Start: 2018-11-27 | End: 2018-11-27

## 2018-11-28 ENCOUNTER — HOSPITAL ENCOUNTER (OUTPATIENT)
Facility: HOSPITAL | Age: 68
Setting detail: HOSPITAL OUTPATIENT SURGERY
Discharge: HOME | End: 2018-11-28
Attending: UROLOGY | Admitting: UROLOGY

## 2018-11-28 ENCOUNTER — ANESTHESIA EVENT (OUTPATIENT)
Dept: PERIOP | Facility: HOSPITAL | Age: 68
End: 2018-11-28

## 2018-11-28 ENCOUNTER — ANESTHESIA (OUTPATIENT)
Dept: PERIOP | Facility: HOSPITAL | Age: 68
End: 2018-11-28

## 2018-11-28 VITALS
SYSTOLIC BLOOD PRESSURE: 110 MMHG | OXYGEN SATURATION: 94 % | TEMPERATURE: 99.3 F | DIASTOLIC BLOOD PRESSURE: 51 MMHG | RESPIRATION RATE: 18 BRPM | HEART RATE: 69 BPM

## 2018-11-28 DIAGNOSIS — N52.31 ERECTILE DYSFUNCTION AFTER RADICAL PROSTATECTOMY: ICD-10-CM

## 2018-11-28 PROCEDURE — 25010000002 EPINEPHRINE PF 1 MG/10ML SOLUTION PREFILLED SYRINGE: Performed by: NURSE ANESTHETIST, CERTIFIED REGISTERED

## 2018-11-28 PROCEDURE — 25010000002 PROPOFOL 200 MG/20ML EMULSION: Performed by: NURSE ANESTHETIST, CERTIFIED REGISTERED

## 2018-11-28 PROCEDURE — 94660 CPAP INITIATION&MGMT: CPT

## 2018-11-28 PROCEDURE — C1813 PROSTHESIS, PENILE, INFLATAB: HCPCS | Performed by: UROLOGY

## 2018-11-28 PROCEDURE — 25010000002 DEXAMETHASONE PER 1 MG: Performed by: UROLOGY

## 2018-11-28 PROCEDURE — C1813 PROSTHESIS, PENILE, INFLATAB: HCPCS

## 2018-11-28 PROCEDURE — 25010000002 DEXAMETHASONE PER 1 MG: Performed by: NURSE ANESTHETIST, CERTIFIED REGISTERED

## 2018-11-28 PROCEDURE — 25010000002 ONDANSETRON PER 1 MG: Performed by: NURSE ANESTHETIST, CERTIFIED REGISTERED

## 2018-11-28 PROCEDURE — 25010000002 LINEZOLID 600 MG/300ML SOLUTION: Performed by: UROLOGY

## 2018-11-28 PROCEDURE — 25010000002 VANCOMYCIN 1 G RECONSTITUTED SOLUTION 1 EACH VIAL: Performed by: UROLOGY

## 2018-11-28 PROCEDURE — 54405 INSERT MULTI-COMP PENIS PROS: CPT | Performed by: UROLOGY

## 2018-11-28 PROCEDURE — 25010000002 HYDROCORTISONE SODIUM SUCCINATE 100 MG RECONSTITUTED SOLUTION: Performed by: NURSE ANESTHETIST, CERTIFIED REGISTERED

## 2018-11-28 PROCEDURE — 25010000002 DOPAMINE PER 40 MG: Performed by: NURSE ANESTHETIST, CERTIFIED REGISTERED

## 2018-11-28 PROCEDURE — 25010000002 GENTAMICIN PER 80 MG: Performed by: UROLOGY

## 2018-11-28 PROCEDURE — 87086 URINE CULTURE/COLONY COUNT: CPT | Performed by: UROLOGY

## 2018-11-28 PROCEDURE — 25010000002 FENTANYL CITRATE (PF) 250 MCG/5ML SOLUTION: Performed by: NURSE ANESTHETIST, CERTIFIED REGISTERED

## 2018-11-28 PROCEDURE — 25010000002 SUCCINYLCHOLINE PER 20 MG: Performed by: NURSE ANESTHETIST, CERTIFIED REGISTERED

## 2018-11-28 DEVICE — TITAN® TOUCH SCROTAL ZERO DEGREE ANGLE CYLINDER SET WITH PUMP
Type: IMPLANTABLE DEVICE | Site: PENIS | Status: FUNCTIONAL
Brand: TITAN

## 2018-11-28 DEVICE — CL RESERVOIR
Type: IMPLANTABLE DEVICE | Site: PENIS | Status: FUNCTIONAL
Brand: TITAN

## 2018-11-28 RX ORDER — LINEZOLID 2 MG/ML
600 INJECTION, SOLUTION INTRAVENOUS ONCE
Status: COMPLETED | OUTPATIENT
Start: 2018-11-28 | End: 2018-11-28

## 2018-11-28 RX ORDER — TRAMADOL HYDROCHLORIDE 50 MG/1
50 TABLET ORAL EVERY 6 HOURS PRN
Qty: 30 TABLET | Refills: 0 | Status: SHIPPED | OUTPATIENT
Start: 2018-11-28 | End: 2019-03-21

## 2018-11-28 RX ORDER — ONDANSETRON 2 MG/ML
INJECTION INTRAMUSCULAR; INTRAVENOUS AS NEEDED
Status: DISCONTINUED | OUTPATIENT
Start: 2018-11-28 | End: 2018-11-28 | Stop reason: SURG

## 2018-11-28 RX ORDER — SODIUM CHLORIDE 0.9 % (FLUSH) 0.9 %
3 SYRINGE (ML) INJECTION AS NEEDED
Status: DISCONTINUED | OUTPATIENT
Start: 2018-11-28 | End: 2018-11-28 | Stop reason: HOSPADM

## 2018-11-28 RX ORDER — VANCOMYCIN HYDROCHLORIDE 500 MG/10ML
INJECTION, POWDER, LYOPHILIZED, FOR SOLUTION INTRAVENOUS AS NEEDED
Status: DISCONTINUED | OUTPATIENT
Start: 2018-11-28 | End: 2018-11-28 | Stop reason: HOSPADM

## 2018-11-28 RX ORDER — GLYCOPYRROLATE 0.2 MG/ML
INJECTION INTRAMUSCULAR; INTRAVENOUS AS NEEDED
Status: DISCONTINUED | OUTPATIENT
Start: 2018-11-28 | End: 2018-11-28 | Stop reason: SURG

## 2018-11-28 RX ORDER — DIAPER,BRIEF,INFANT-TODD,DISP
EACH MISCELLANEOUS AS NEEDED
Status: DISCONTINUED | OUTPATIENT
Start: 2018-11-28 | End: 2018-11-28 | Stop reason: HOSPADM

## 2018-11-28 RX ORDER — MAGNESIUM HYDROXIDE 1200 MG/15ML
LIQUID ORAL AS NEEDED
Status: DISCONTINUED | OUTPATIENT
Start: 2018-11-28 | End: 2018-11-28 | Stop reason: HOSPADM

## 2018-11-28 RX ORDER — SODIUM CHLORIDE, SODIUM LACTATE, POTASSIUM CHLORIDE, CALCIUM CHLORIDE 600; 310; 30; 20 MG/100ML; MG/100ML; MG/100ML; MG/100ML
1000 INJECTION, SOLUTION INTRAVENOUS CONTINUOUS
Status: DISCONTINUED | OUTPATIENT
Start: 2018-11-28 | End: 2018-11-28 | Stop reason: HOSPADM

## 2018-11-28 RX ORDER — DOCUSATE SODIUM 100 MG/1
100 CAPSULE, LIQUID FILLED ORAL 2 TIMES DAILY
Qty: 60 CAPSULE | Refills: 1 | Status: SHIPPED | OUTPATIENT
Start: 2018-11-28 | End: 2019-03-21

## 2018-11-28 RX ORDER — ROCURONIUM BROMIDE 10 MG/ML
INJECTION, SOLUTION INTRAVENOUS AS NEEDED
Status: DISCONTINUED | OUTPATIENT
Start: 2018-11-28 | End: 2018-11-28 | Stop reason: SURG

## 2018-11-28 RX ORDER — GENTAMICIN SULFATE 40 MG/ML
INJECTION, SOLUTION INTRAMUSCULAR; INTRAVENOUS AS NEEDED
Status: DISCONTINUED | OUTPATIENT
Start: 2018-11-28 | End: 2018-11-28 | Stop reason: HOSPADM

## 2018-11-28 RX ORDER — SODIUM CHLORIDE 9 MG/ML
INJECTION, SOLUTION INTRAVENOUS CONTINUOUS PRN
Status: DISCONTINUED | OUTPATIENT
Start: 2018-11-28 | End: 2018-11-28 | Stop reason: SURG

## 2018-11-28 RX ORDER — SUCCINYLCHOLINE CHLORIDE 20 MG/ML
INJECTION INTRAMUSCULAR; INTRAVENOUS AS NEEDED
Status: DISCONTINUED | OUTPATIENT
Start: 2018-11-28 | End: 2018-11-28 | Stop reason: SURG

## 2018-11-28 RX ORDER — DEXAMETHASONE SODIUM PHOSPHATE 4 MG/ML
INJECTION, SOLUTION INTRA-ARTICULAR; INTRALESIONAL; INTRAMUSCULAR; INTRAVENOUS; SOFT TISSUE AS NEEDED
Status: DISCONTINUED | OUTPATIENT
Start: 2018-11-28 | End: 2018-11-28 | Stop reason: SURG

## 2018-11-28 RX ORDER — BUPIVACAINE HYDROCHLORIDE 5 MG/ML
INJECTION, SOLUTION EPIDURAL; INTRACAUDAL AS NEEDED
Status: DISCONTINUED | OUTPATIENT
Start: 2018-11-28 | End: 2018-11-28 | Stop reason: HOSPADM

## 2018-11-28 RX ORDER — PROPOFOL 10 MG/ML
INJECTION, EMULSION INTRAVENOUS AS NEEDED
Status: DISCONTINUED | OUTPATIENT
Start: 2018-11-28 | End: 2018-11-28 | Stop reason: SURG

## 2018-11-28 RX ORDER — DOPAMINE HYDROCHLORIDE 160 MG/100ML
INJECTION, SOLUTION INTRAVENOUS CONTINUOUS PRN
Status: DISCONTINUED | OUTPATIENT
Start: 2018-11-28 | End: 2018-11-28 | Stop reason: SURG

## 2018-11-28 RX ORDER — FENTANYL CITRATE 50 UG/ML
INJECTION, SOLUTION INTRAMUSCULAR; INTRAVENOUS AS NEEDED
Status: DISCONTINUED | OUTPATIENT
Start: 2018-11-28 | End: 2018-11-28 | Stop reason: SURG

## 2018-11-28 RX ORDER — DEXAMETHASONE SODIUM PHOSPHATE 4 MG/ML
INJECTION, SOLUTION INTRA-ARTICULAR; INTRALESIONAL; INTRAMUSCULAR; INTRAVENOUS; SOFT TISSUE AS NEEDED
Status: DISCONTINUED | OUTPATIENT
Start: 2018-11-28 | End: 2018-11-28 | Stop reason: HOSPADM

## 2018-11-28 RX ADMIN — GENTAMICIN SULFATE 400 MG: 40 INJECTION, SOLUTION INTRAMUSCULAR; INTRAVENOUS at 08:08

## 2018-11-28 RX ADMIN — FENTANYL CITRATE 100 MCG: 50 INJECTION, SOLUTION INTRAMUSCULAR; INTRAVENOUS at 08:05

## 2018-11-28 RX ADMIN — SODIUM CHLORIDE: 9 INJECTION, SOLUTION INTRAVENOUS at 08:38

## 2018-11-28 RX ADMIN — EPINEPHRINE 0.1 MG: 0.1 INJECTION, SOLUTION ENDOTRACHEAL; INTRACARDIAC; INTRAVENOUS at 08:37

## 2018-11-28 RX ADMIN — ATROPINE SULFATE 0.2 MG: 0.1 INJECTION, SOLUTION INTRAVENOUS at 08:26

## 2018-11-28 RX ADMIN — ROCURONIUM BROMIDE 30 MG: 10 INJECTION INTRAVENOUS at 10:31

## 2018-11-28 RX ADMIN — DEXAMETHASONE SODIUM PHOSPHATE 8 MG: 4 INJECTION, SOLUTION INTRAMUSCULAR; INTRAVENOUS at 08:20

## 2018-11-28 RX ADMIN — ONDANSETRON 4 MG: 2 INJECTION INTRAMUSCULAR; INTRAVENOUS at 08:20

## 2018-11-28 RX ADMIN — EPHEDRINE SULFATE 10 MG: 50 INJECTION INTRAMUSCULAR; INTRAVENOUS; SUBCUTANEOUS at 08:28

## 2018-11-28 RX ADMIN — SODIUM CHLORIDE: 9 INJECTION, SOLUTION INTRAVENOUS at 08:19

## 2018-11-28 RX ADMIN — EPHEDRINE SULFATE 20 MG: 50 INJECTION INTRAMUSCULAR; INTRAVENOUS; SUBCUTANEOUS at 08:33

## 2018-11-28 RX ADMIN — LIDOCAINE HYDROCHLORIDE 40 MG: 20 INJECTION, SOLUTION INTRAVENOUS at 08:09

## 2018-11-28 RX ADMIN — PROPOFOL 150 MG: 10 INJECTION, EMULSION INTRAVENOUS at 08:10

## 2018-11-28 RX ADMIN — GLYCOPYRROLATE 0.4 MG: 0.2 INJECTION, SOLUTION INTRAMUSCULAR; INTRAVENOUS at 08:18

## 2018-11-28 RX ADMIN — LINEZOLID 600 MG: 2 INJECTION, SOLUTION INTRAVENOUS at 08:39

## 2018-11-28 RX ADMIN — SUCCINYLCHOLINE CHLORIDE 110 MG: 20 INJECTION, SOLUTION INTRAMUSCULAR; INTRAVENOUS at 08:10

## 2018-11-28 RX ADMIN — HYDROCORTISONE SODIUM SUCCINATE 100 MG: 100 INJECTION, POWDER, FOR SOLUTION INTRAMUSCULAR; INTRAVENOUS at 08:51

## 2018-11-28 RX ADMIN — SODIUM CHLORIDE, POTASSIUM CHLORIDE, SODIUM LACTATE AND CALCIUM CHLORIDE: 600; 310; 30; 20 INJECTION, SOLUTION INTRAVENOUS at 11:05

## 2018-11-28 RX ADMIN — DOPAMINE HYDROCHLORIDE IN DEXTROSE 5 MCG/KG/MIN: 1.6 INJECTION, SOLUTION INTRAVENOUS at 09:00

## 2018-11-28 RX ADMIN — SODIUM CHLORIDE, POTASSIUM CHLORIDE, SODIUM LACTATE AND CALCIUM CHLORIDE: 600; 310; 30; 20 INJECTION, SOLUTION INTRAVENOUS at 07:48

## 2018-11-28 RX ADMIN — SODIUM CHLORIDE, POTASSIUM CHLORIDE, SODIUM LACTATE AND CALCIUM CHLORIDE: 600; 310; 30; 20 INJECTION, SOLUTION INTRAVENOUS at 09:01

## 2018-11-28 RX ADMIN — EPINEPHRINE 0.1 MG: 0.1 INJECTION, SOLUTION ENDOTRACHEAL; INTRACARDIAC; INTRAVENOUS at 08:50

## 2018-11-28 NOTE — ANESTHESIA POSTPROCEDURE EVALUATION
Patient: José Miguel Jerry    Procedure Summary     Date:  11/28/18 Room / Location:  [facility] OR  /  [facility]    Anesthesia Start:  0809 Anesthesia Stop:  1209    Procedure:  PENILE PROSTHESIS PLACEMENT (N/A Penis) Diagnosis:       Erectile dysfunction after radical prostatectomy      (Erectile dysfunction after radical prostatectomy [N52.31])    Surgeon:  Nick Sommer MD Provider:  Fransisco Llamas CRNA    Anesthesia Type:  general ASA Status:  3          Anesthesia Type: general  Last vitals  BP   126/70 (11/28/18 0655)   Temp   98.5 °F (36.9 °C) (11/28/18 0655)   Pulse   62 (11/28/18 0655)   Resp   18 (11/28/18 0655)     SpO2   97 % (11/28/18 0655)     Post Anesthesia Care and Evaluation    Patient location during evaluation: PACU  Patient participation: complete - patient participated  Level of consciousness: awake and alert and sleepy but conscious  Pain score: 2  Pain management: adequate  Airway patency: patent  Anesthetic complications: No anesthetic complications  PONV Status: none  Cardiovascular status: acceptable  Respiratory status: acceptable and face mask  Hydration status: acceptable

## 2018-11-28 NOTE — NURSING NOTE
3303 Dr. Sommer at bedside spoke to patient. He also gave orders to discharge patient to home and to call him with any concerns.

## 2018-11-28 NOTE — OP NOTE
PREOPERATIVE DIAGNOSIS: Erectile dysfunction  POSTOPERATIVE DIAGNOSIS: Erectile dysfunction  PROCEDURE: Insertion of multi-component penile implant ([id number]) utilizing “no-touch” technique, ectopic reservoir placement  SURGEON: Nick Sommer MD  ASSISTANT: Debo Pham  ANESTHESIA: General  EBL: 30 mL  DRAINS: 8 flat SANTANA        INDICATIONS: The patient is a 68 y.o.-year-old male with a history of ED following robotic assisted radical prostatectomy and a history of hypertension.      We discussed penile implant surgery given his coexisting ED. We had a discussion regarding penile implant surgery, its pros and cons and risks and benefits including an approximately 3% infection rate and 15% re-operation rate within the first decade after primary implantation. Given his prior robotic assisted radical prostatectomy and, he was counseled regarding 2-piece or a 3-piece device with ectopic reservoir placement. He opted for the latter. The patient signed an informed consent form for the hospital and verbalized understanding of the risks, potential complications, and outcomes and requested to proceed with the operation.      PRE-OPERATIVE PREPARATION: The patient was seen in the weeks prior to surgery. He had his pre-operative laboratory testing performed, had a urine specimen collected for culture and sensitivity. He was instructed at that point in time to use Chlorhexidine scrub once per day while showering for the 7 days prior to surgery. He was also instructed to take Bactrim DS 1 PO BID for the 48 hours prior to surgery. On the morning of surgery he was admitted to the pre-operative holding area having been NPO post-midnight. He had an intravenous line commenced and had the administration of Gentamicin at 5mg/kg as well as 600mg of Linezolid.      OPERATION: The patient was brought into the operating room, he was had the administration of general anesthesia, and the patient was placed on the OR table with his legs in a  "\"frog like position\" with the knees slightly bent outward and feet touching on the midline.     SKIN PREPARATION: The lower inguinal and peno-scrotal area was shaved completely and prepped with two Duraprep applicators. It was allowed to evaporate for 3 minutes and the patient was subsequently draped in the routine sterile fashion.      A 3M Ioban Antibacterial Incise Drape was placed over the lower abdomen prior to the surgical drapes after placing towels identifying the sterile field. He was then draped in the standard multi-layer fashion. The penis and scrotum were then delivered through a small fenestration in the extremity drape. Throughout the procedure copious amounts antibiotic irrigation (vancomycin/gentamicin) solution was used and for the purposes of exposure, a Lonestar retractor with yellow elastic stays were utilized.      INCISION/VENTRAL PHALLOPLASTY: The head of the penis was grasped and upward traction was applied until maximum stretch was achieved. Inferior and ventral traction was then applied to the mid-scrotal area at the level of the median raphe to define the extent of the penoscrotal webbing. We marked a line at the penoscrotal junction. The marked line was incised with a scalpel. This was carried down to subcutaneous layers using a combination of cautery and blunt dissection until the tunica albuginea was clearly identified on both sides of the urethra. Four blunt yellow hooks were utilized to maintain the skin incision open by being secured to the Román retractor.     NO-TOUCH TECHNIQUE OF SKIN ISOLATION: At this point surgical gloves, instruments and sponges that had touched the patient's skin were removed and isolated from the surgical field. New sterile gloves were utilized to place the loose transparent surgical drape over the entire surgical field. A small opening was made in the drape exactly over the skin incision. The drape was then secured to the edges of the surgical wound with " an additional four blunt yellow hooks also secured to the Román retractor. The skin was thus completely covered and further dissection and complete insertion of the prosthesis device could be performed through the aperture of the transparent drape without direct contact with the patient's skin.      CORPORAL DILATION AND SIZING: The corporal bodies were incised for a length of approximately 1 inch one finger-breadth lateral to the urethral margin, between 2/0 Maxon sutures on either side. Dilation was conducted with a Dilamezinsert dilator with uneventful dilation into the javon and into the glans penis for both corporal bodies. No crural perforation or urethral perforation was encountered. Each corpus cavernosum was measured independently utilizing the blunt rikki and 11 Hennessy dilators. Both the right and left corpora were measured two times alternating sides between measurements. A total corporal length was measured at 18 cm bilaterally (9 proximally and 9 distally on each side). After the corpora were measured a second time, a decision was made to use a 18 cm Coloplast Titan pump, prosthesis with no rear tip extenders.     CYLINDER PLACEMENT: At this point, the prosthesis was brought into the field and delivered from its sterile package. The prosthesis was prepared for insertion by purging all air out of the system. The device was then sized properly by adding the correct size of rear tip extenders. The cylinders were placed in the standard fashion using a Hemal needle/Rikki passer system. The cylinders were lined up so that the tubing from each cylinder to the pump did not cross over each other. The cylinders were nicely seated within each corporal body.      SURROGATE SALINE TEST: Prior to closure of the corporotomy a surrogate test was performed with a filled 60 mL syringe. When fully pumped the distal tip of each cylinder reached the glans penis. The glans penis was well supported. In addition, no  effacement of the groove between the glans penis and penile shaft was noted and no overriding of cylinders identified. The prosthesis was appropriately sized with an excellent erection and no evidence of under or over sizing. With detumescence, minimal folding of cylinders was noted indicating that the cylinders again were appropriately sized. It took 50 mL of saline to inflate the device fully.      RESERVOIR SPACE CREATION: His prostatectomy surgery caused his interest in having his reservoir placement in the sub-rectus area. The bladder was completely emptied via the Ramirez catheter. A 5 cm transverse incision was made in the left lower quadrant. This was extended down through the subcutaneous tissues with the bovie electrocautery. The anterior rectus fascia was divided sharply along the length of the incision. The rectus muscle was split for the reservoir to be placed behind it after a subrectus space was fashioned between the rectus muscle and the posterior fascia. All air was removed from the 125 mL Coloplast cloverleaf reservoir, and then placed into the reservoir space with the nipple pointing downwards. Tubing from the reservoir was then tunneled with the tubing passing device from the reservoir space through the floor of the inguinal canal and into the scrotum subcutaneously and the tubing was passed downwards in to the scrotum. The reservoir was filled with 70 cc of normal saline and was then clamped with a shodded clamp. The anterior rectus fascia was then closed using 2-0 vicryl suture in a running fashion and 3-0 Vicryl suture for Azra's fascia, 4-0 Biosyn to close the skin in a subcuticular fashion, followed by Dermabond.    CLOSURE OF THE CORPOROTOMIES: The prosthesis was deflated and the traction suture from each cylinder tip secured to the Román retractor with mosquito clamps. The corporotomies were then closed with the 0 PDS preplaced sutures. Each corporotomy was then examined for hemostasis and  felt to be grossly watertight.     PUMP PLACEMENT: The scrotal pump was placed in a pouch in a midline dependent position. The pouch was fashioned with finger dissection and once the pump was placed the Dartos layer was closed with running absorbable sutures, great care being taken not to violate the device or the tubing. Following this, the redundant tubing was excised and the reservoir and pump tubing were connected in a standard fashion with the connectors in the assembly kit. Great care was taken not to have the tubing cross over each other.       PROSTHESIS CYCLING: The pump was activated and deactivated several times. Each time the penis was examined and cylinder size and erection reassessed. Good cosmesis of the flaccid and erect penis was present. At this point in time, the device was fully inflated and deflated. Upon inflation there was a straight rigid erection with the tips present within the glans penis. The device was left partially inflated at the end of the case.      INCISION CLOSURE/VENTRAL PHALLOPLASTY: Following the achievement of complete hemostasis and final irrigation with antibiotic solution, the Dartos layer was closed in two layers with running 3-0 Vicryl suture.  An 8 flat SANTANA drain was tunneled through the right samuel-scrotal area and the drain itself placed next to the pump.  This was sutured to the skin with 3-0 nylon. The skin was re-approximated along the median raphe axis with 4/0 Biosyn in an interrupted horizontal mattress fashion. The incisions were infiltrated with 0.5% plain bupivacaine. Antibiotic irrigation was used to wash out the scrotum.  Bacitracin was applied over the wound with dry sterile mummy wrap dressing over this and a fluff gauze pads were unraveled acting as a compression dressing within a scrotal support.      The patient was awake in the operating room. He was transferred to the recovery room in stable condition.    Plan  He will follow-up with me tomorrow morning  in clinic for Ramirez catheter removal and removal of the SANTANA drain and mummy wrap.

## 2018-11-28 NOTE — H&P
SIDDHARTHA Jerry is a 68 y.o. with history of ED    No recent fevers or new LUTS  Does not take any blood thinners    Past Medical History  Past Medical History:   Diagnosis Date   • At risk for sleep apnea     referred to pulmonologist for workup   • Depression    • Elevated cholesterol    • Erectile dysfunction    • History of echocardiogram 08/2018   • History of exercise stress test 08/2018   • Hypertension    • Prostate cancer (CMS/[facility]) 2012   • Wears glasses        Past Surgical History  Past Surgical History:   Procedure Laterality Date   • APPENDECTOMY     • COLONOSCOPY     • PROSTATE SURGERY     • SKIN BIOPSY      skin rash many years ago on lower legs and did several biopsies   • TEETH EXTRACTION         Medications  No current facility-administered medications for this encounter.     Allergies  Allergies   Allergen Reactions   • Chlorhexidine Rash   • Clindamycin/Lincomycin Rash     itching       Social History  Social History     Socioeconomic History   • Marital status:      Spouse name: Not on file   • Number of children: Not on file   • Years of education: Not on file   • Highest education level: Not on file   Social Needs   • Financial resource strain: Not on file   • Food insecurity - worry: Not on file   • Food insecurity - inability: Not on file   • Transportation needs - medical: Not on file   • Transportation needs - non-medical: Not on file   Occupational History     Employer: RETIRED   Tobacco Use   • Smoking status: Never Smoker   • Smokeless tobacco: Never Used   Substance and Sexual Activity   • Alcohol use: Yes     Comment: OCCASIONAL   • Drug use: No   • Sexual activity: Yes   Other Topics Concern   • Not on file   Social History Narrative   • Not on file       Review of Systems  Constitutional: No fevers or chills  Skin: Negative for rash  Endocrine: No heat/cold intolerance   Cardiovascular: Negative for chest pain or dyspnea on exertion  Respiratory: Negative for shortness  of breath or wheezing  Gastrointestinal: No constipation, nausea or vomiting  Genitourinary: Negative for new lower urinary tract symptoms, current gross hematuria or dysuria.  Musculoskeletal: No flank pain  Neurological:  Negative for frequent headaches or dizziness  Lymph/Heme: Negative for leg swelling or calf pain.    Physical Exam  Visit Vitals  /70 (BP Location: Left arm, Patient Position: Sitting)   Pulse 62   Temp 98.5 °F (36.9 °C) (Tympanic)   Resp 18   SpO2 97%     Constitutional: NAD, WDWN.   HEENT: NCAT. Conjunctivae normal.  MMM.    Cardiovascular: Regular rate.  Pulmonary/Chest: Respirations are even and non-labored bilaterally.  Abdominal: Soft. No distension, tenderness, masses or guarding. No CVA tenderness.  Neurological: A + O x 3.  Cranial Nerves II-XII grossly intact. Normal gait.  Extremities: SUDHA x 4, Warm. No clubbing.  No cyanosis.    Skin: Pink, warm and dry.  No rashes noted.  Psychiatric:  Normal mood and affect    Labs  @LASTLABOSUSHORT(SODIUM,POTASSIUM,CHLORIDE,CO2,BUN,CREATSERUM,GLUCOSE)@  @LASTLABOSUSHORT(WBC,WBCCOUNT,WBCFETAL,HGB,HCT,PLATELET,MCV)@   No results found for: PSA  No components found for: CREATSERUM      Assessment  José Miguel Jerry is a 68 y.o. male who presents with ED    Plan  1. To OR for IPP    Nick Sommer MD

## 2018-11-28 NOTE — ANESTHESIA PREPROCEDURE EVALUATION
Anesthesia Evaluation     Patient summary reviewed and Nursing notes reviewed   NPO Solid Status: > 8 hours  NPO Liquid Status: > 8 hours           Airway   Mallampati: II  TM distance: >3 FB  No difficulty expected  Dental      Pulmonary    Cardiovascular   Exercise tolerance: good (4-7 METS)    Rate: normal    (+) hyperlipidemia,     ROS comment: Angiographic Findings:  Right coronary dominance.  · LM:  Minor irregularities, no significant disease.  · LAD: Mild proximal and midsegment ectasia with mild diffuse plaque without hemodynamically significant disease.  Segments of up to 30% narrowings are noted.  A small first diagonal branch has a 50% ostial stenosis.  · LCX: Mild plaque, no significant disease.  · RCA: Mild diffuse plaque with up to 30% proximal narrowing, no significant disease.  · LV: Left ventriculogram performed in 30 DURAND projection revealed normal global and regional left ventricular systolic function with estimated ejection fraction of 60%.  No mitral regurgitation was noted.       Neuro/Psych  (+) psychiatric history Anxiety and Depression,     GI/Hepatic/Renal/Endo    (+)  GERD,      Musculoskeletal     Abdominal    Substance History      OB/GYN          Other      history of cancer                    Anesthesia Plan    ASA 3     general     intravenous induction   Anesthetic plan, all risks, benefits, and alternatives have been provided, discussed and informed consent has been obtained with: patient.    Plan discussed with CRNA.

## 2018-11-28 NOTE — DISCHARGE INSTRUCTIONS
Please follow all post op instructions and follow up appointment time from your physician's office included in your discharge packet.    No pushing, pulling, tugging,  heavy lifting, or strenuous activity.  No major decision making, driving, or drinking alcoholic beverages for 24 hours. ( due to the medications you have  received)  Always use good hand hygiene/washing techniques.  NO driving while taking pain medications.    To assist you in voiding:  Drink plenty of fluids  Listen to running water while attempting to void.    If you are unable to urinate and you have an uncomfortable urge to void or it has been   6 hours since you were discharged, return to the Emergency Room        POSTOPERATIVE INSTRUCTIONS FOR   PENILE IMPLANT SURGERY    YOU ARE TO EXPECT  1. Small amounts of drainage on your dressings  2. Pain, tenderness, swelling and bruising for several days after your surgery.  3. Nausea may be experienced the day of your operation after you return home.  4. You should already have prescriptions for pain medicine and antibiotics.  5. You should already have an appointment for a post-operative visit with Dr. Sommer the next day.    INSTRUCTIONS  1. Keep the mummy wrap dressing on overnight. Dr. Sommer will remove it and the villafuerte catheter in the office the next day.  2. Keep the extra gauze dressing used for compression for 4 days.   3. If the scrotal dressing becomes soiled replace it with a similarly sized clean gauze dressing.  4. Keep the scrotal support on for at least one week.  5. Position your penis upwards on your abdomen as much as possible.   6. For 2-3 days, place an ice pack inside the scrotal support (not directly in contact with skin).  7. Drink plenty of fluids.  8. Eat lightly the remainder of the day of surgery, then return to normal diet as tolerated.  9. Make sure your bowels function normally since pain medications can cause constipation.  You may need to use stool softeners such as Colace  (Docusate Sodium) to help your bowels function normally.  10. Finish your antibiotics completely.  11. You may shower on post-operative day 4 (4 days after surgery), but avoid tub baths for 2 full weeks.  12. You may walk around the home and upstairs immediately, but carefully.  13. When sitting raise your legs.  14. Do not drive until at least one week after surgery.  15. Do not lift any heavy objects (greater than 10 lbs) for at least 2 weeks after surgery.  16. Empty the drain and villafuerte bag as they fill up. These will be removed postop day 1.   17. Please wait 1 week to restart your aspirin    CALL FOR THE FOLLOWING REASONS  1. Fevers over 101 degrees.  2. Excessive drainage from your wound, especially blood or pus  3. Pain or scrotal swelling that is not improving after the first week following surgery  4. Redness around the wound.  5. If you need to see your Dentist, you will need antibiotic coverage to start the night prior to treatment and for 48 hours after treatment. Please call to have a prescription called to your Pharmacy      CONTACTS  Urology Office:  [id number]   [place] [address] [phone] office  [phone] fax    FOLLOW UP  You have a follow up scheduled with Dr. Sommer on Thursday, 11/29/18 at 9:45am

## 2018-11-28 NOTE — ANESTHESIA PROCEDURE NOTES
ANESTHESIA INTUBATION  Urgency: elective    Airway not difficult    General Information and Staff    Patient location during procedure: OR  CRNA: Fransisco Llamas CRNA    Indications and Patient Condition  Indications for airway management: airway protection    Preoxygenated: yes      Final Airway Details  Final airway type: endotracheal airway      Successful airway: ETT  Cuffed: yes   Successful intubation technique: direct laryngoscopy and video laryngoscopy  Endotracheal tube insertion site: oral  Blade: Johnston  Blade size: 2  ETT size (mm): 7.5  Cormack-Lehane Classification: grade I - full view of glottis  Placement verified by: chest auscultation and capnometry   Number of attempts at approach: 1

## 2018-11-29 ENCOUNTER — OFFICE VISIT (OUTPATIENT)
Dept: UROLOGY | Facility: CLINIC | Age: 68
End: 2018-11-29

## 2018-11-29 DIAGNOSIS — N52.31 ERECTILE DYSFUNCTION AFTER RADICAL PROSTATECTOMY: Primary | ICD-10-CM

## 2018-11-29 PROCEDURE — 99024 POSTOP FOLLOW-UP VISIT: CPT | Performed by: UROLOGY

## 2018-11-29 RX ORDER — BACITRACIN ZINC 500 [USP'U]/G
OINTMENT TOPICAL
Qty: 28 G | Refills: 0 | Status: SHIPPED | OUTPATIENT
Start: 2018-11-29 | End: 2019-03-21

## 2018-11-29 NOTE — PROGRESS NOTES
The patient presents today postoperative day 1 after insertion of penile prosthesis with ectopic reservoir placement.  He has some scrotal swelling and bruising, but denies any scrotal pain.  There has been some mild oozing from the incision site into the mom wrap, but not much drain output.  His urine is clear yellow.    Today I removed his mummy wrap, his Ramirez catheter, and his SANTANA drain.  There was some bruising in the suprapubic area and in the perineal region.  I reassured him that this was expected and that some oozing from the incision is also expected.  His pumping is in good location and I instructed him to gently tug on it daily.  I left his cylinders approximately 40% inflated.  I will see him in 2 weeks for wound check and then in 4-6 weeks for potential teaching and activation.  He was instructed to continue the Bactrim and gabapentin to completion.  He was instructed to ice the scrotum daily for the next week.  I also instructed him to apply bacitracin ointment to the wound twice a day and use the scrotal support for the next week.

## 2018-11-30 LAB — BACTERIA SPEC AEROBE CULT: NO GROWTH

## 2018-12-03 ENCOUNTER — TELEPHONE (OUTPATIENT)
Dept: UROLOGY | Facility: CLINIC | Age: 68
End: 2018-12-03

## 2018-12-03 NOTE — TELEPHONE ENCOUNTER
Patient called office complaining of swelling around penis. Patient states he has used ice packs per 5 days.

## 2018-12-06 ENCOUNTER — OFFICE VISIT (OUTPATIENT)
Dept: UROLOGY | Facility: CLINIC | Age: 68
End: 2018-12-06

## 2018-12-06 VITALS
OXYGEN SATURATION: 91 % | WEIGHT: 205 LBS | HEART RATE: 82 BPM | BODY MASS INDEX: 27.17 KG/M2 | TEMPERATURE: 97.6 F | HEIGHT: 73 IN

## 2018-12-06 DIAGNOSIS — N52.1 ERECTILE DYSFUNCTION DUE TO DISEASES CLASSIFIED ELSEWHERE: Primary | ICD-10-CM

## 2018-12-06 DIAGNOSIS — N50.82 SCROTAL PAIN: ICD-10-CM

## 2018-12-06 PROCEDURE — 99024 POSTOP FOLLOW-UP VISIT: CPT | Performed by: UROLOGY

## 2018-12-06 RX ORDER — LIDOCAINE 50 MG/G
OINTMENT TOPICAL
Qty: 50 G | Refills: 1 | Status: SHIPPED | OUTPATIENT
Start: 2018-12-06 | End: 2019-03-21

## 2018-12-06 RX ORDER — SULFAMETHOXAZOLE AND TRIMETHOPRIM 800; 160 MG/1; MG/1
1 TABLET ORAL 2 TIMES DAILY
Qty: 14 TABLET | Refills: 0 | Status: SHIPPED | OUTPATIENT
Start: 2018-12-06 | End: 2018-12-13

## 2018-12-06 RX ORDER — HYDROXYZINE HYDROCHLORIDE 10 MG/1
10 TABLET, FILM COATED ORAL 3 TIMES DAILY PRN
Qty: 30 TABLET | Refills: 1 | Status: SHIPPED | OUTPATIENT
Start: 2018-12-06 | End: 2019-03-21

## 2018-12-06 RX ORDER — NYSTATIN 100000 [USP'U]/G
POWDER TOPICAL
Qty: 30 G | Refills: 0 | Status: SHIPPED | OUTPATIENT
Start: 2018-12-06 | End: 2019-03-21

## 2018-12-06 NOTE — PROGRESS NOTES
Patient is here today for an unscheduled visit due to scrotal itching and burning.  He says that starting yesterday afternoon he started having burning of his scrotum that has been extremely annoying to him.  He denies fevers, denies burning with urination.  He denies penile pain.  He denies pain deep within the scrotum, saying that the pain and burning seem to be of the scrotal skin.  He has tried icing the area with little relief.  He has noticed a large amount of bruising, that seems to be breaking down and getting better.  He has not appreciated any new redness or heat to the area.  He denies any purulence from any of the wounds.     Physical exam: There is a moderate amount of bruising and ecchymosis of the suprapubic area and over his anterior scrotum and perineum.  There is no erythema or drainage of any of the wounds.  The wounds do not feel hot to palpation.  Overall impression is not of an infectious etiology.  There is a small amount of whitish material in his groin folds.  There is some dryness and cracking of the anterior scrotal skin.    My impression is that he may have burning from the ecchymosis breaking down, or he could potentially have skin irritation or jock itch of the scrotal skin.     Plan  Start warm soaks in the bath daily  Bactrim ×1 week and nystatin powder  Lidocaine cream and hydroxyzine  Follow-up in 1 week

## 2018-12-08 ENCOUNTER — HOSPITAL ENCOUNTER (EMERGENCY)
Facility: HOSPITAL | Age: 68
Discharge: HOME | End: 2018-12-08
Attending: EMERGENCY MEDICINE | Admitting: EMERGENCY MEDICINE

## 2018-12-08 VITALS
WEIGHT: 207.4 LBS | HEIGHT: 73 IN | BODY MASS INDEX: 27.49 KG/M2 | HEART RATE: 118 BPM | TEMPERATURE: 97.7 F | RESPIRATION RATE: 20 BRPM | SYSTOLIC BLOOD PRESSURE: 149 MMHG | DIASTOLIC BLOOD PRESSURE: 89 MMHG | OXYGEN SATURATION: 96 %

## 2018-12-08 DIAGNOSIS — R20.8 DYSESTHESIA: ICD-10-CM

## 2018-12-08 DIAGNOSIS — M79.2 NEUROPATHIC PAIN: Primary | ICD-10-CM

## 2018-12-08 LAB
BILIRUB UR QL STRIP: NEGATIVE
CLARITY UR: CLEAR
COLOR UR: YELLOW
GLUCOSE UR STRIP-MCNC: NEGATIVE MG/DL
HGB UR QL STRIP.AUTO: NEGATIVE
KETONES UR QL STRIP: ABNORMAL
LEUKOCYTE ESTERASE UR QL STRIP.AUTO: NEGATIVE
NITRITE UR QL STRIP: NEGATIVE
PH UR STRIP.AUTO: 5.5 [PH] (ref 5–8)
PROT UR QL STRIP: NEGATIVE
SP GR UR STRIP: >=1.03 (ref 1–1.03)
UROBILINOGEN UR QL STRIP: ABNORMAL

## 2018-12-08 PROCEDURE — 99284 EMERGENCY DEPT VISIT MOD MDM: CPT

## 2018-12-08 PROCEDURE — 81003 URINALYSIS AUTO W/O SCOPE: CPT | Performed by: EMERGENCY MEDICINE

## 2018-12-08 RX ORDER — GABAPENTIN 100 MG/1
300 CAPSULE ORAL ONCE
Status: DISCONTINUED | OUTPATIENT
Start: 2018-12-08 | End: 2018-12-08

## 2018-12-08 RX ORDER — GABAPENTIN 300 MG/1
300 CAPSULE ORAL 3 TIMES DAILY
Qty: 15 CAPSULE | Refills: 0 | Status: SHIPPED | OUTPATIENT
Start: 2018-12-08 | End: 2018-12-10

## 2018-12-08 RX ORDER — GABAPENTIN 400 MG/1
400 CAPSULE ORAL ONCE
Status: DISCONTINUED | OUTPATIENT
Start: 2018-12-08 | End: 2018-12-08 | Stop reason: HOSPADM

## 2018-12-08 RX ADMIN — GABAPENTIN 600 MG: 100 CAPSULE ORAL at 02:24

## 2018-12-08 NOTE — ED PROVIDER NOTES
Subjective   68-year-old male presents to the ED with a chief complaint of scrotal burning.  The patient states that his scrotum has been burning severely since Wednesday.  It is been constant since onset.  He states that it is not painful but that his scrotum feels like it is on fire.  The patient denies pain.  No hematuria or dysuria.  No genital lesions.  No fever or chills.  No nausea vomiting diarrhea or abdominal pain.  Of note the patient is 2 weeks post penile implant surgery.  He saw his urologist.  Days ago who attempted to treat his symptoms with the topical cream and nystatin.  He states that it has not improved at all.  He denies other complaints at this time.            Review of Systems   Constitutional: Negative for fatigue and fever.   Genitourinary: Negative for difficulty urinating, genital sores, hematuria, penile pain, scrotal swelling and testicular pain.        Scrotal burning    All other systems reviewed and are negative.      Past Medical History:   Diagnosis Date   • At risk for sleep apnea     referred to pulmonologist for workup   • Depression    • Elevated cholesterol    • Erectile dysfunction    • History of echocardiogram 08/2018   • History of exercise stress test 08/2018   • Hypertension    • Prostate cancer (CMS/HCC) 2012   • Wears glasses        Allergies   Allergen Reactions   • Chlorhexidine Rash   • Clindamycin/Lincomycin Rash     itching       Past Surgical History:   Procedure Laterality Date   • APPENDECTOMY     • COLONOSCOPY     • PROSTATE SURGERY     • SKIN BIOPSY      skin rash many years ago on lower legs and did several biopsies   • TEETH EXTRACTION         Family History   Problem Relation Age of Onset   • Hypertension Mother    • Heart disease Mother    • Kidney disease Father    • Liver disease Father    • Heart disease Daughter    • Heart disease Maternal Grandfather    • Heart disease Paternal Grandfather        Social History     Socioeconomic History   • Marital  status:      Spouse name: Not on file   • Number of children: Not on file   • Years of education: Not on file   • Highest education level: Not on file   Occupational History     Employer: RETIRED   Tobacco Use   • Smoking status: Never Smoker   • Smokeless tobacco: Never Used   Substance and Sexual Activity   • Alcohol use: Yes     Comment: OCCASIONAL   • Drug use: No   • Sexual activity: Yes           Objective   Physical Exam   Constitutional: He is oriented to person, place, and time. He appears well-developed and well-nourished. No distress.   HENT:   Head: Normocephalic and atraumatic.   Nose: Nose normal.   Eyes: Conjunctivae and EOM are normal. Pupils are equal, round, and reactive to light.   Cardiovascular: Normal rate and regular rhythm.   Pulmonary/Chest: Effort normal and breath sounds normal. No respiratory distress.   Abdominal: Soft. He exhibits no distension. There is no tenderness.   Genitourinary:   Genitourinary Comments: No crepitus or induration of the scrotum.  There is bruising throughout the scrotum and perineal region and inguinal regions.  The bruising appears to be healing.  Penis and scrotum are non tender to palpation    Musculoskeletal: He exhibits no edema or deformity.   Neurological: He is alert and oriented to person, place, and time. No cranial nerve deficit. Coordination normal.   Skin: He is not diaphoretic.   Nursing note and vitals reviewed.      Procedures           ED Course      presents with scrotal burning.  Physical exam is nonspecific does show bruising of the scrotum and inguinal regions.  Patient describes a non-painful burning sensation that could represent neuropathic pain or genital dysaesthesia.  Hemodynamically stable.  No signs of infection on examination.  Patient will be discharged with gabapentin.  Follow-up with urology early next week..            MDM      Final diagnoses:   Neuropathic pain   Dysesthesia            Serafin Yañez, DO  12/08/18  9000

## 2018-12-10 DIAGNOSIS — N50.82 SCROTAL PAIN: Primary | ICD-10-CM

## 2018-12-10 RX ORDER — GABAPENTIN 300 MG/1
900 CAPSULE ORAL 3 TIMES DAILY
Qty: 135 CAPSULE | Refills: 0 | Status: SHIPPED | OUTPATIENT
Start: 2018-12-10 | End: 2018-12-25

## 2018-12-10 RX ORDER — OXYCODONE HYDROCHLORIDE AND ACETAMINOPHEN 5; 325 MG/1; MG/1
1-2 TABLET ORAL EVERY 6 HOURS PRN
Qty: 30 TABLET | Refills: 0 | Status: SHIPPED | OUTPATIENT
Start: 2018-12-10 | End: 2019-03-21

## 2018-12-10 RX ORDER — DOCUSATE SODIUM 100 MG/1
100 CAPSULE, LIQUID FILLED ORAL 2 TIMES DAILY
Qty: 60 CAPSULE | Refills: 1 | Status: SHIPPED | OUTPATIENT
Start: 2018-12-10 | End: 2019-03-21

## 2018-12-10 RX ORDER — PREDNISONE 10 MG/1
TABLET ORAL
Qty: 31 TABLET | Refills: 0 | Status: SHIPPED | OUTPATIENT
Start: 2018-12-10 | End: 2019-03-21

## 2018-12-13 ENCOUNTER — OFFICE VISIT (OUTPATIENT)
Dept: UROLOGY | Facility: CLINIC | Age: 68
End: 2018-12-13

## 2018-12-13 ENCOUNTER — RESULTS ENCOUNTER (OUTPATIENT)
Dept: UROLOGY | Facility: CLINIC | Age: 68
End: 2018-12-13

## 2018-12-13 VITALS
TEMPERATURE: 97.3 F | WEIGHT: 207 LBS | HEIGHT: 73 IN | OXYGEN SATURATION: 98 % | HEART RATE: 84 BPM | BODY MASS INDEX: 27.43 KG/M2

## 2018-12-13 DIAGNOSIS — N50.82 SCROTAL PAIN: ICD-10-CM

## 2018-12-13 DIAGNOSIS — N52.31 ERECTILE DYSFUNCTION AFTER RADICAL PROSTATECTOMY: Primary | ICD-10-CM

## 2018-12-13 PROCEDURE — 99024 POSTOP FOLLOW-UP VISIT: CPT | Performed by: UROLOGY

## 2018-12-13 NOTE — PROGRESS NOTES
"Chief Complaint  ED    HPI  Mr. Jerry is a 68 y.o. male with history of erectile dysfunction after robotic prostatectomy, status post insertion of 3-piece Coloplast penile prosthesis with ectopic reservoir on 11/28/18.     He initially did well postoperatively but then had scrotal burning that did not seem to be associated with infection, but has since improved with warm baths and a steroid taper.  He denies any scrotal burning today.  His ecchymosis after the procedure has largely resolved.    I have reviewed the patient's medical history in detail and updated the computerized patient record.      Review of Systems  Constitutional: No fevers or chills  Skin: Negative for rash  Endocrine: No heat/cold intolerance   Gastrointestinal: Negative for constipation, nausea or vomiting  Genitourinary: Negative for new lower urinary tract symptoms, gross hematuria or dysuria.  Musculoskeletal: Negative for flank pain  Neurological:  Negative for frequent headaches or dizziness  Lymph/Heme: Negative for leg swelling or calf pain    Physical Exam  Visit Vitals  Pulse 84   Temp 97.3 °F (36.3 °C)   Ht 185.4 cm (72.99\")   Wt 93.9 kg (207 lb)   SpO2 98%   BMI 27.32 kg/m²     Constitutional: NAD, WDWN.  Cardiovascular: Regular rate.  Pulmonary/Chest: Respirations are even and non-labored bilaterally.  Abdominal: Soft. No distension, tenderness, masses or guarding. No CVA tenderness.  Abdominal incision is healing well  Extremities: SUDHA x 4, Warm. No clubbing.  No cyanosis.    Skin: Pink, warm and dry.  No rashes noted.    Genitourinary  Penis: circumcised penis, glans normal, no penile discharge.  No rashes/lesions.  Device seems to be seated well within the corpora.  The incision is healing well  Testes: descended bilaterally, the ecchymosis has resolved, but there is still a small expected amount of scrotal swelling,      Labs  Lab Results   Component Value Date    WBC 5.44 11/26/2018    HGB 17.0 11/26/2018    HCT 50.4 " 11/26/2018    MCV 90.8 11/26/2018     11/26/2018         Radiographic Studies  Xr Chest 1 View    Result Date: 11/26/2018  Unremarkable portable chest.  This report was finalized on 11/26/2018 10:38 AM by Bari Galvan MD.      Assessment  68 y.o. male with history of erectile dysfunction after robotic prostatectomy, status post insertion of 3-piece Coloplast penile prosthesis with ectopic reservoir on 11/28/18.  He has had scrotal earning without any evidence of infection during his postoperative recovery, which is resolving.    Plan  1.  Continue steroid taper to completion  2.  Continue daily warm soaks with tugging on the pump  3.  Continue scrotal support with the penis elevated vertically behind the band  4.  Follow-up in 4 weeks for swelling evaluation and likely cycling and teaching with the device    No Follow-up on file.    Nick Sommer MD

## 2019-01-15 ENCOUNTER — OFFICE VISIT (OUTPATIENT)
Dept: UROLOGY | Facility: CLINIC | Age: 69
End: 2019-01-15

## 2019-01-15 VITALS
HEIGHT: 73 IN | TEMPERATURE: 97.6 F | BODY MASS INDEX: 27.43 KG/M2 | HEART RATE: 90 BPM | WEIGHT: 207 LBS | OXYGEN SATURATION: 97 %

## 2019-01-15 DIAGNOSIS — N52.31 ERECTILE DYSFUNCTION AFTER RADICAL PROSTATECTOMY: Primary | ICD-10-CM

## 2019-01-15 PROCEDURE — 99024 POSTOP FOLLOW-UP VISIT: CPT | Performed by: UROLOGY

## 2019-01-15 NOTE — PROGRESS NOTES
"Chief Complaint  ED    HPI  Mr. Jerry is a 68 y.o. male with history of erectile dysfunction after robotic prostatectomy, status post insertion of 3-piece Coloplast penile prosthesis with ectopic reservoir on 11/28/18.     He initially did well postoperatively but then had scrotal burning that did not seem to be associated with infection, but has since improved with warm baths and a steroid taper.  He denies any scrotal burning today.  His ecchymosis after the procedure has completely resolved.     I have reviewed the patient's medical history in detail and updated the computerized patient record.      Review of Systems  Constitutional: No fevers or chills  Skin: Negative for rash  Endocrine: No heat/cold intolerance   Gastrointestinal: Negative for constipation, nausea or vomiting  Genitourinary: Negative for new lower urinary tract symptoms, gross hematuria or dysuria.  Musculoskeletal: Negative for flank pain  Neurological:  Negative for frequent headaches or dizziness  Lymph/Heme: Negative for leg swelling or calf pain    Physical Exam  Visit Vitals  Pulse 90   Temp 97.6 °F (36.4 °C)   Ht 185.4 cm (72.99\")   Wt 93.9 kg (207 lb)   SpO2 97%   BMI 27.32 kg/m²     Constitutional: NAD, WDWN.  Cardiovascular: Regular rate.  Pulmonary/Chest: Respirations are even and non-labored bilaterally.  Abdominal: Soft. No distension, tenderness, masses or guarding. No CVA tenderness.  Abdominal incision is healing well  Extremities: SUDHA x 4, Warm. No clubbing.  No cyanosis.    Skin: Pink, warm and dry.  No rashes noted.    Genitourinary  Penis: uncircumcised penis, glans normal, no penile discharge.  No rashes/lesions.  Device seems to be seated well within the corpora.  The incision is healed  Testes: descended bilaterally, the ecchymosis has resolved, but there is still a small amount of scrotal swelling on the anterior scrotum over the pump.      Labs  Lab Results   Component Value Date    WBC 5.44 11/26/2018    HGB 17.0 " 11/26/2018    HCT 50.4 11/26/2018    MCV 90.8 11/26/2018     11/26/2018     Radiographic Studies  No radiology results for the last 30 days.    Assessment  68 y.o. male with history of erectile dysfunction after robotic prostatectomy, status post insertion of 3-piece Coloplast penile prosthesis with ectopic reservoir on 11/28/18.  Still some mild edema on exam today and unable to easily palpate release valve.    Plan  1.  Continue daily warm soaks with tugging on the pump  3.  Continue scrotal support with the penis elevated vertically behind the band  4.  Follow-up in 3 weeks for swelling evaluation and likely cycling and teaching with the device    No Follow-up on file.    Nick Sommer MD

## 2019-02-19 ENCOUNTER — PROCEDURE VISIT (OUTPATIENT)
Dept: UROLOGY | Facility: CLINIC | Age: 69
End: 2019-02-19

## 2019-02-19 VITALS
DIASTOLIC BLOOD PRESSURE: 66 MMHG | SYSTOLIC BLOOD PRESSURE: 129 MMHG | OXYGEN SATURATION: 99 % | HEART RATE: 72 BPM | TEMPERATURE: 98 F

## 2019-02-19 DIAGNOSIS — C61 MALIGNANT NEOPLASM OF PROSTATE (HCC): ICD-10-CM

## 2019-02-19 DIAGNOSIS — N52.9 ERECTILE DYSFUNCTION, UNSPECIFIED ERECTILE DYSFUNCTION TYPE: ICD-10-CM

## 2019-02-19 DIAGNOSIS — E29.1 HYPOGONADISM IN MALE: Primary | ICD-10-CM

## 2019-02-19 LAB
BILIRUB BLD-MCNC: NEGATIVE MG/DL
CLARITY, POC: CLEAR
COLOR UR: YELLOW
GLUCOSE UR STRIP-MCNC: NEGATIVE MG/DL
KETONES UR QL: NEGATIVE
LEUKOCYTE EST, POC: NEGATIVE
NITRITE UR-MCNC: NEGATIVE MG/ML
PH UR: 6 [PH] (ref 5–8)
PROT UR STRIP-MCNC: NEGATIVE MG/DL
RBC # UR STRIP: NEGATIVE /UL
SP GR UR: 1.03 (ref 1–1.03)
UROBILINOGEN UR QL: NORMAL

## 2019-02-19 PROCEDURE — 81003 URINALYSIS AUTO W/O SCOPE: CPT | Performed by: UROLOGY

## 2019-02-19 PROCEDURE — 99214 OFFICE O/P EST MOD 30 MIN: CPT | Performed by: UROLOGY

## 2019-02-19 NOTE — PROGRESS NOTES
Chief Complaint  Hypogonadism (4 month fup with labs.)        SIDDHARTHA Jerry is a 68 y.o. male who returns today for follow-up of multiple urological concerns.  Most recently he's had insertion of a penile prosthesis per Dr. Sommer and is due for follow-up next week but is doing well.  More than 5 years ago he had prostate cancer treated with radical prostatectomy.  Subsequently his developed hypogonadism and requires testosterone supplement.  He has achieved a great benefit from supplement and is anxious to continue.  He understands the critical need to monitor for safety and returns with blood work within normal limits including a PSA near 0.  Testosterone his dropped back very low and therefore he is scheduled for Testopel pellets.    Vitals:    02/19/19 0956   BP: 129/66   Pulse: 72   Temp: 98 °F (36.7 °C)   SpO2: 99%       Past Medical History  Past Medical History:   Diagnosis Date   • At risk for sleep apnea     referred to pulmonologist for workup   • Depression    • Elevated cholesterol    • Erectile dysfunction    • History of echocardiogram 08/2018   • History of exercise stress test 08/2018   • Hypertension    • Prostate cancer (CMS/HCC) 2012   • Wears glasses        Past Surgical History  Past Surgical History:   Procedure Laterality Date   • APPENDECTOMY     • CARDIAC CATHETERIZATION N/A 8/20/2018    Procedure: Left Heart Cath;  Surgeon: Anastacia Gary MD;  Location:  [facility];  Service: Cardiology   • COLONOSCOPY     • HEAD/NECK LESION/CYST EXCISION Right 10/31/2016    Procedure: EXCISE BCCA RIGHT SCALP;  Surgeon: Lowell Rider MD;  Location:  [facility];  Service:    • PENILE PROSTHESIS IMPLANT N/A 11/28/2018    Procedure: PENILE PROSTHESIS PLACEMENT;  Surgeon: Nick Sommer MD;  Location:  [facility];  Service: Urology   • PROSTATE SURGERY     • SKIN BIOPSY      skin rash many years ago on lower legs and did several biopsies   • TEETH EXTRACTION         Medications  has a  current medication list which includes the following prescription(s): amlodipine-benazepril, aspirin, bacitracin, docusate sodium, docusate sodium, doxycycline, hydrochlorothiazide, hydroxyzine, lidocaine, metoprolol succinate xl, montelukast, multiple vitamins-minerals, nortriptyline, nystatin, omeprazole, oxycodone-acetaminophen, prednisone, simvastatin, tramadol, and venlafaxine xr.      Allergies  Allergies   Allergen Reactions   • Chlorhexidine Rash   • Clindamycin/Lincomycin Rash     itching       Social History  Social History     Social History Narrative   • Not on file       Family History  He has no family history of bladder or kidney cancer  He has no family history of kidney stones      AUA Symptom Score:      Review of Systems  Review of Systems   Constitutional: Negative.    Genitourinary: Negative.    All other systems reviewed and are negative.      Physical Exam  Physical Exam    Labs Recent and today in the office:  Results for orders placed or performed in visit on 02/19/19   POC Urinalysis Dipstick, Automated   Result Value Ref Range    Color Yellow Yellow, Straw, Dark Yellow, Bridgett    Clarity, UA Clear Clear    Specific Gravity  1.030 1.005 - 1.030    pH, Urine 6.0 5.0 - 8.0    Leukocytes Negative Negative    Nitrite, UA Negative Negative    Protein, POC Negative Negative mg/dL    Glucose, UA Negative Negative, 1000 mg/dL (3+) mg/dL    Ketones, UA Negative Negative    Urobilinogen, UA Normal Normal    Bilirubin Negative Negative    Blood, UA Negative Negative         Assessment & Plan  #1 hypogonadism: He is achieved a great benefit from supplement and is anxious to continue.  He understands the need to monitor for toxicity of therapy.  Recent blood work was nominal including a PSA near 0.  He'll return next week for Testopel pellet insertion

## 2019-02-21 ENCOUNTER — OFFICE VISIT (OUTPATIENT)
Dept: UROLOGY | Facility: CLINIC | Age: 69
End: 2019-02-21

## 2019-02-21 VITALS — WEIGHT: 207 LBS | HEIGHT: 73 IN | RESPIRATION RATE: 16 BRPM | BODY MASS INDEX: 27.43 KG/M2

## 2019-02-21 DIAGNOSIS — N52.9 ERECTILE DYSFUNCTION, UNSPECIFIED ERECTILE DYSFUNCTION TYPE: Primary | ICD-10-CM

## 2019-02-21 PROCEDURE — 99024 POSTOP FOLLOW-UP VISIT: CPT | Performed by: UROLOGY

## 2019-02-21 NOTE — PROGRESS NOTES
"Chief Complaint  ED    HPI  Mr. Jerry is a 68 y.o. male with history of erectile dysfunction after robotic prostatectomy, status post insertion of 3-piece Coloplast penile prosthesis with ectopic reservoir on 11/28/18.     He initially did well postoperatively but then had scrotal burning that did not seem to be associated with infection, but has since improved with warm baths and a steroid taper.  He denies any scrotal burning today.  His ecchymosis after the procedure has completely resolved.     He presents today for device activation.    I have reviewed the patient's medical history in detail and updated the computerized patient record.      Review of Systems  Constitutional: No fevers or chills  Skin: Negative for rash  Endocrine: No heat/cold intolerance   Gastrointestinal: Negative for constipation, nausea or vomiting  Genitourinary: Negative for new lower urinary tract symptoms, gross hematuria or dysuria.  Musculoskeletal: Negative for flank pain  Neurological:  Negative for frequent headaches or dizziness  Lymph/Heme: Negative for leg swelling or calf pain    Physical Exam  Visit Vitals  Resp 16   Ht 185.4 cm (72.99\")   Wt 93.9 kg (207 lb)   BMI 27.32 kg/m²     Constitutional: NAD, WDWN.  Cardiovascular: Regular rate.  Pulmonary/Chest: Respirations are even and non-labored bilaterally.  Abdominal: Soft. No distension, tenderness, masses or guarding. No CVA tenderness.  Abdominal incision is healing well  Extremities: SUDHA x 4, Warm. No clubbing.  No cyanosis.    Skin: Pink, warm and dry.  No rashes noted.    Genitourinary  Penis: uncircumcised penis, glans normal, no penile discharge.  No rashes/lesions.  Device seems to be seated well within the corpora.  The incision is healed  Testes: descended bilaterally, the ecchymosis has resolved, but there is still a small amount of scrotal swelling on the anterior scrotum over the pump.      Labs  Lab Results   Component Value Date    WBC 6.41 02/14/2019    " HGB 15.3 02/14/2019    HCT 45.7 02/14/2019    MCV 84.6 02/14/2019     02/14/2019     Radiographic Studies  No radiology results for the last 30 days.    Assessment  68 y.o. male with history of erectile dysfunction after robotic prostatectomy, status post insertion of 3-piece Coloplast penile prosthesis with ectopic reservoir on 11/28/18. Device did not inflate correctly today for inflation teaching today.     Plan  1.  I will contact the .  Follow-up in 1 week to discuss possible revision    No Follow-up on file.    Nick Sommer MD

## 2019-02-25 DIAGNOSIS — T83.410S BREAKDOWN (MECHANICAL) OF IMPLANTED PENILE PROSTHESIS, SEQUELA: Primary | ICD-10-CM

## 2019-03-18 ENCOUNTER — HOSPITAL ENCOUNTER (OUTPATIENT)
Dept: CT IMAGING | Facility: HOSPITAL | Age: 69
Discharge: HOME | End: 2019-03-18
Admitting: UROLOGY

## 2019-03-18 ENCOUNTER — OFFICE VISIT (OUTPATIENT)
Dept: UROLOGY | Facility: CLINIC | Age: 69
End: 2019-03-18

## 2019-03-18 DIAGNOSIS — T83.410S BREAKDOWN (MECHANICAL) OF IMPLANTED PENILE PROSTHESIS, SEQUELA: ICD-10-CM

## 2019-03-18 DIAGNOSIS — E29.1 HYPOGONADISM IN MALE: ICD-10-CM

## 2019-03-18 PROCEDURE — 11980 IMPLANT HORMONE PELLET(S): CPT | Performed by: UROLOGY

## 2019-03-18 PROCEDURE — 74176 CT ABD & PELVIS W/O CONTRAST: CPT

## 2019-03-18 NOTE — PROGRESS NOTES
Chief Complaint  Colton Jerry is a 68 y.o. male who returns today for insertion of Testopel pellets.    There were no vitals filed for this visit.    Past Medical History  Past Medical History:   Diagnosis Date   • At risk for sleep apnea     referred to pulmonologist for workup   • Depression    • Elevated cholesterol    • Erectile dysfunction    • History of echocardiogram 08/2018   • History of exercise stress test 08/2018   • Hypertension    • Prostate cancer (CMS/HCC) 2012   • Wears glasses        Past Surgical History  Past Surgical History:   Procedure Laterality Date   • APPENDECTOMY     • CARDIAC CATHETERIZATION N/A 8/20/2018    Procedure: Left Heart Cath;  Surgeon: Anastacia Gary MD;  Location:  [facility];  Service: Cardiology   • COLONOSCOPY     • HEAD/NECK LESION/CYST EXCISION Right 10/31/2016    Procedure: EXCISE BCCA RIGHT SCALP;  Surgeon: Lowell Riedr MD;  Location:  [facility];  Service:    • PENILE PROSTHESIS IMPLANT N/A 11/28/2018    Procedure: PENILE PROSTHESIS PLACEMENT;  Surgeon: Nick Sommer MD;  Location:  [facility];  Service: Urology   • PROSTATE SURGERY     • SKIN BIOPSY      skin rash many years ago on lower legs and did several biopsies   • TEETH EXTRACTION         Medications  has a current medication list which includes the following prescription(s): amlodipine-benazepril, aspirin, bacitracin, docusate sodium, docusate sodium, doxycycline, hydrochlorothiazide, hydroxyzine, lidocaine, metoprolol succinate xl, montelukast, multiple vitamins-minerals, nortriptyline, nystatin, omeprazole, oxycodone-acetaminophen, prednisone, simvastatin, tramadol, and venlafaxine xr.      Allergies  Allergies   Allergen Reactions   • Chlorhexidine Rash   • Clindamycin/Lincomycin Rash     itching       Social History  Social History     Social History Narrative   • Not on file       Family History  He has no family history of bladder or kidney cancer  He has no  family history of kidney stones      AUA Symptom Score:      Review of Systems  Review of Systems    Physical Exam  Physical Exam    Labs Recent and today in the office:  Results for orders placed or performed in visit on 02/19/19   POC Urinalysis Dipstick, Automated   Result Value Ref Range    Color Yellow Yellow, Straw, Dark Yellow, Bridgett    Clarity, UA Clear Clear    Specific Gravity  1.030 1.005 - 1.030    pH, Urine 6.0 5.0 - 8.0    Leukocytes Negative Negative    Nitrite, UA Negative Negative    Protein, POC Negative Negative mg/dL    Glucose, UA Negative Negative, 1000 mg/dL (3+) mg/dL    Ketones, UA Negative Negative    Urobilinogen, UA Normal Normal    Bilirubin Negative Negative    Blood, UA Negative Negative     Testopel Subcutaneous hormone pellet implantation:    The patient was placed on the exam table in the supine position.  The upper outer quadrant of the hip was identified for insertion.  The area was then prepped with Betadine and injected with 7ml of Lidocaine 2% with Epinephrine to anesthetize superficially and then distally along the trocar tract.  A 3mm incision was made using #11 blade scalpel.  The trocar with a sharp-ended stylet was inserted into the subcutaneous tissue in line with the femur.  The sharp stylet was withdrawn and the Testopel pellets were placed into the well of the trocar.  Testopel was advanced into the tissue using blunt-ended stylet.  A total of 12 pellets were inserted. For the J-code of  the NDC # for the pellets is 92206-080-70. The trocar was removed and the incision was closed using Steri-strips.  The wound area was cleansed to remove the Betadine and was covered with Steri-strips with an outer Band-aid.  Two subcutaneous tract were developed.  Careful inspection of the area was done.              Assessment & Plan  Gonad is him:The patient was informed of the post-procedure instructions.  He will return in 1 month to monitor testosterone levels and then again  at 4 months to determine scheduling of the next insertion.

## 2019-03-21 ENCOUNTER — OFFICE VISIT (OUTPATIENT)
Dept: UROLOGY | Facility: CLINIC | Age: 69
End: 2019-03-21

## 2019-03-21 ENCOUNTER — APPOINTMENT (OUTPATIENT)
Dept: PREADMISSION TESTING | Facility: HOSPITAL | Age: 69
End: 2019-03-21

## 2019-03-21 VITALS — WEIGHT: 207 LBS | RESPIRATION RATE: 16 BRPM | HEIGHT: 73 IN | BODY MASS INDEX: 27.43 KG/M2

## 2019-03-21 VITALS — HEIGHT: 73 IN | BODY MASS INDEX: 25.84 KG/M2 | WEIGHT: 195 LBS

## 2019-03-21 DIAGNOSIS — N52.9 ERECTILE DYSFUNCTION, UNSPECIFIED ERECTILE DYSFUNCTION TYPE: ICD-10-CM

## 2019-03-21 DIAGNOSIS — N52.9 ERECTILE DYSFUNCTION, UNSPECIFIED ERECTILE DYSFUNCTION TYPE: Primary | ICD-10-CM

## 2019-03-21 LAB
ANION GAP SERPL CALCULATED.3IONS-SCNC: 7.7 MMOL/L (ref 10–20)
BUN BLD-MCNC: 13 MG/DL (ref 7–20)
BUN/CREAT SERPL: 16.3 (ref 6.3–21.9)
CALCIUM SPEC-SCNC: 9.2 MG/DL (ref 8.4–10.2)
CHLORIDE SERPL-SCNC: 101 MMOL/L (ref 98–107)
CO2 SERPL-SCNC: 32 MMOL/L (ref 26–30)
CREAT BLD-MCNC: 0.8 MG/DL (ref 0.6–1.3)
DEPRECATED RDW RBC AUTO: 45.5 FL (ref 37–54)
ERYTHROCYTE [DISTWIDTH] IN BLOOD BY AUTOMATED COUNT: 14.8 % (ref 11.5–14.5)
GFR SERPL CREATININE-BSD FRML MDRD: 96 ML/MIN/1.73
GLUCOSE BLD-MCNC: 73 MG/DL (ref 74–98)
HCT VFR BLD AUTO: 41.6 % (ref 42–52)
HGB BLD-MCNC: 14 G/DL (ref 14–18)
MCH RBC QN AUTO: 28.6 PG (ref 27–31)
MCHC RBC AUTO-ENTMCNC: 33.7 G/DL (ref 30–37)
MCV RBC AUTO: 84.9 FL (ref 80–94)
PLATELET # BLD AUTO: 167 10*3/MM3 (ref 130–400)
PMV BLD AUTO: 9.7 FL (ref 6–12)
POTASSIUM BLD-SCNC: 3.7 MMOL/L (ref 3.5–5.1)
RBC # BLD AUTO: 4.9 10*6/MM3 (ref 4.7–6.1)
SODIUM BLD-SCNC: 137 MMOL/L (ref 137–145)
WBC NRBC COR # BLD: 5.62 10*3/MM3 (ref 4.8–10.8)

## 2019-03-21 PROCEDURE — 85027 COMPLETE CBC AUTOMATED: CPT | Performed by: UROLOGY

## 2019-03-21 PROCEDURE — 80048 BASIC METABOLIC PNL TOTAL CA: CPT | Performed by: UROLOGY

## 2019-03-21 PROCEDURE — 36415 COLL VENOUS BLD VENIPUNCTURE: CPT

## 2019-03-21 PROCEDURE — 99214 OFFICE O/P EST MOD 30 MIN: CPT | Performed by: UROLOGY

## 2019-03-21 RX ORDER — SODIUM CHLORIDE, SODIUM LACTATE, POTASSIUM CHLORIDE, CALCIUM CHLORIDE 600; 310; 30; 20 MG/100ML; MG/100ML; MG/100ML; MG/100ML
100 INJECTION, SOLUTION INTRAVENOUS CONTINUOUS
Status: CANCELLED | OUTPATIENT
Start: 2019-03-21

## 2019-03-21 NOTE — PROGRESS NOTES
"Chief Complaint  ED    HPI  Mr. Jerry is a 68 y.o. male with history of erectile dysfunction after robotic prostatectomy, status post insertion of 3-piece Coloplast penile prosthesis with ectopic reservoir on 11/28/18.     He is completely healed today, but still cannot get the device to be fully erect. He presents today to discuss revision.     I have reviewed the patient's medical history in detail and updated the computerized patient record.      Review of Systems  Constitutional: No fevers or chills  Skin: Negative for rash  Endocrine: No heat/cold intolerance   Gastrointestinal: Negative for constipation, nausea or vomiting  Genitourinary: Negative for new lower urinary tract symptoms, gross hematuria or dysuria.  Musculoskeletal: Negative for flank pain  Neurological:  Negative for frequent headaches or dizziness  Lymph/Heme: Negative for leg swelling or calf pain    Physical Exam  Visit Vitals  Resp 16   Ht 185.4 cm (72.99\")   Wt 93.9 kg (207 lb)   BMI 27.32 kg/m²     Constitutional: NAD, WDWN.  Cardiovascular: Regular rate.  Pulmonary/Chest: Respirations are even and non-labored bilaterally.  Abdominal: Soft. No distension, tenderness, masses or guarding. No CVA tenderness.  Abdominal incision is healing well  Extremities: SUDHA x 4, Warm. No clubbing.  No cyanosis.    Skin: Pink, warm and dry.  No rashes noted.      Labs  Lab Results   Component Value Date    WBC 6.41 02/14/2019    HGB 15.3 02/14/2019    HCT 45.7 02/14/2019    MCV 84.6 02/14/2019     02/14/2019     Radiographic Studies  Ct Abdomen Pelvis Without Contrast    Result Date: 3/18/2019  No abnormality noted within the penile prosthesis reservoir or visualized tubing in the left lower quadrant.       678.55 mGy.cm. 14.58 mGy  This study was performed with techniques to keep radiation doses as low as reasonably achievable (ALARA). Individualized dose reduction techniques using automated exposure control or adjustment of mA and/or kV " according to the patient size were employed.  This report was finalized on 3/18/2019 1:34 PM by Lian Estrada M.D..      Assessment  68 y.o. male with history of erectile dysfunction after robotic prostatectomy, status post insertion of 3-piece Coloplast penile prosthesis with ectopic reservoir on 11/28/18. Device did not inflate correctly today for inflation. The reservoir does not look kinked on CT, though the tubing does come off at a right angle.     I informed him of the risks, benefits, and alternatives to revision of his penile prosthesis.  At this point I think it is most likely a malfunction with the pump, since the reservoir does not look like it is folded in half or anything.  We will plan to try and revise just the pump, but if this is not working then I will make a counterincision and revise the reservoir as well.  I did consent him and he is prepared that he may have an entire device revision.  The risks of the procedure that we discussed are the same as with the initial procedure and include bleeding, infection, damage to nearby structures, need for future procedures, and cardiopulmonary complications from anesthesia.  Since the burning of his skin that he had last time may have come from the DuraPrep, I will use Betadine for his prep at next procedure.    I will likely use either agenesis pump from Coloplast or an AMS pump for the procedure.    Plan  1.  Schedule for revision 4/9/19  2.  PAT    I spent a total of 25 minutes with the patient in face-to-face interaction, with >50% of the time spent in engaging in counseling on the risks, benefits, and alternatives of the therapy and coordinating care.         Nick Sommer MD

## 2019-04-04 ENCOUNTER — TELEPHONE (OUTPATIENT)
Dept: UROLOGY | Facility: CLINIC | Age: 69
End: 2019-04-04

## 2019-04-08 NOTE — TELEPHONE ENCOUNTER
Patient is scheduled for surgery on 4/8/19. I called patient to confirm and was transferred to Dr. Sommer.

## 2019-06-04 ENCOUNTER — OFFICE VISIT (OUTPATIENT)
Dept: UROLOGY | Facility: CLINIC | Age: 69
End: 2019-06-04

## 2019-06-04 VITALS — WEIGHT: 195 LBS | TEMPERATURE: 97.4 F | HEIGHT: 73 IN | BODY MASS INDEX: 25.84 KG/M2

## 2019-06-04 DIAGNOSIS — N52.9 ERECTILE DYSFUNCTION, UNSPECIFIED ERECTILE DYSFUNCTION TYPE: Primary | ICD-10-CM

## 2019-06-04 DIAGNOSIS — C61 PROSTATE CANCER (HCC): ICD-10-CM

## 2019-06-04 PROCEDURE — 99214 OFFICE O/P EST MOD 30 MIN: CPT | Performed by: UROLOGY

## 2019-06-04 NOTE — PROGRESS NOTES
"Chief Complaint  ED    HPI  Mr. Jerry is a 69 y.o. male with history of erectile dysfunction after robotic prostatectomy, status post insertion of 3-piece Coloplast penile prosthesis with ectopic reservoir on 11/28/18.     He is completely healed, but still cannot get the device to be fully erect.  We suspect that there may be a problem with the lockout valve on the pump itself.  He has had CT scans which do not demonstrate any fluid collection and show appropriate placement of the ectopic reservoir. He presents today to discuss revision /replacement with a new device.     I have reviewed the patient's medical history in detail and updated the computerized patient record.    Review of Systems  Constitutional: No fevers or chills  Skin: Negative for rash  Endocrine: No heat/cold intolerance   Gastrointestinal: Negative for constipation, nausea or vomiting  Genitourinary: Negative for new lower urinary tract symptoms, gross hematuria or dysuria.  Musculoskeletal: Negative for flank pain  Neurological:  Negative for frequent headaches or dizziness  Lymph/Heme: Negative for leg swelling or calf pain    Physical Exam  Visit Vitals  Temp 97.4 °F (36.3 °C)   Ht 185.4 cm (72.99\")   Wt 88.5 kg (195 lb)   BMI 25.73 kg/m²     Constitutional: NAD, WDWN.  Cardiovascular: Regular rate.  Pulmonary/Chest: Respirations are even and non-labored bilaterally.  Abdominal: Soft. No distension, tenderness, masses or guarding. No CVA tenderness.  Abdominal incision is healed  Extremities: SUDHA x 4, Warm. No clubbing.  No cyanosis.    Skin: Pink, warm and dry.  No rashes noted.    His scrotal incision is well-healed, though he does complain of sensitivity to touch and sensitivity with inflating the device on the scrotal skin over the bulb.     Labs  Lab Results   Component Value Date    WBC 5.62 03/21/2019    HGB 14.0 03/21/2019    HCT 41.6 (L) 03/21/2019    MCV 84.9 03/21/2019     03/21/2019     Radiographic Studies  No radiology " results for the last 30 days.    Assessment  69 y.o. male with history of erectile dysfunction after robotic prostatectomy, status post insertion of 3-piece Coloplast penile prosthesis with ectopic reservoir on 11/28/18. Device did not inflate correctly today for inflation (it inflated while in the operating room). The reservoir does not look kinked on CT and there is no fluid collection, though the tubing does come off at a right angle.  He was scheduled for revision, but then canceled due to wife's illness.    I once again had another long discussion with him about his options for revision.  The options that we discussed included replacement of the pump with another Coloplast device, with the possibility that the whole prosthesis could need to be replaced if it did not cycle correctly.  Additionally, he is overall not happy with the Coloplast One Touch pump.  We discussed using a Phoebe pump instead, or we discussed using 1 of the AMS products.  The products that we reviewed range from the 3 piece device, to the Ambicor, in the malleable.  In the end, he was ambivalent about which one he would like to choose, and voiced his continued disappointment that the device did not work correctly currently.     Plan  1.   Referral to Dr. Villavicencio at  for 2nd opinion  2.   FU in 4 weeks    I spent a total of 25 minutes with the patient in face-to-face interaction, with >50% of the time spent in engaging in counseling on the risks, benefits, and alternatives of the therapy and coordinating care.       Nick Sommer MD

## 2019-07-18 ENCOUNTER — OFFICE VISIT (OUTPATIENT)
Dept: UROLOGY | Facility: CLINIC | Age: 69
End: 2019-07-18

## 2019-07-18 VITALS — HEIGHT: 73 IN | RESPIRATION RATE: 18 BRPM | BODY MASS INDEX: 25.84 KG/M2 | WEIGHT: 195 LBS

## 2019-07-18 DIAGNOSIS — N52.9 ERECTILE DYSFUNCTION, UNSPECIFIED ERECTILE DYSFUNCTION TYPE: Primary | ICD-10-CM

## 2019-07-18 PROCEDURE — 99214 OFFICE O/P EST MOD 30 MIN: CPT | Performed by: UROLOGY

## 2019-07-18 NOTE — PROGRESS NOTES
"Chief Complaint  ED    HPI  Mr. Jerry is a 69 y.o. male with history of erectile dysfunction after robotic prostatectomy, status post insertion of 3-piece Coloplast penile prosthesis with ectopic reservoir on 11/28/18.     He is completely healed, but still cannot get the device to be fully erect.  We suspect that there may be a problem with the lockout valve on the pump itself.  He has had CT scans which do not demonstrate any fluid collection and show appropriate placement of the ectopic reservoir. He presents today to discuss revision /replacement with a new device.     He saw Dr. Villavicencio at , who recommended device replacement with 3 piece AMS prosthesis.   He plans to undergo allergy testing due to possible reaction to DuraPrep.     I have reviewed the patient's medical history in detail and updated the computerized patient record.    Review of Systems  Constitutional: No fevers or chills  Skin: Negative for rash  Endocrine: No heat/cold intolerance   Gastrointestinal: Negative for constipation, nausea or vomiting  Genitourinary: Negative for new lower urinary tract symptoms, gross hematuria or dysuria.  Musculoskeletal: Negative for flank pain  Neurological:  Negative for frequent headaches or dizziness  Lymph/Heme: Negative for leg swelling or calf pain    Physical Exam  Visit Vitals  Resp 18   Ht 185.4 cm (72.99\")   Wt 88.5 kg (195 lb)   BMI 25.73 kg/m²     Constitutional: NAD, WDWN.  Cardiovascular: Regular rate.  Pulmonary/Chest: Respirations are even and non-labored bilaterally.  Abdominal: Soft. No distension, tenderness, masses or guarding. No CVA tenderness.  Abdominal incision is healed  Extremities: SUDHA x 4, Warm. No clubbing.  No cyanosis.    Skin: Pink, warm and dry.  No rashes noted.    His scrotal incision is well-healed, though he does complain of sensitivity to touch and sensitivity with inflating the device on the scrotal skin over the bulb.     Labs  Lab Results   Component Value Date    " WBC 5.24 07/16/2019    HGB 17.2 07/16/2019    HCT 51.5 (H) 07/16/2019    MCV 86.6 07/16/2019     07/16/2019     Radiographic Studies  No radiology results for the last 30 days.    Assessment  69 y.o. male with history of erectile dysfunction after robotic prostatectomy, status post insertion of 3-piece Coloplast penile prosthesis with ectopic reservoir on 11/28/18. Device did not inflate correctly today for inflation (it inflated while in the operating room). The reservoir does not look kinked on CT and there is no fluid collection, though the tubing does come off at a right angle.  He was scheduled for revision, but then canceled due to wife's illness.    I once again had another long discussion with him about his options for revision.  The options that we discussed included replacement of the pump with another Coloplast device, with the possibility that the whole prosthesis could need to be replaced if it did not cycle correctly.  Overall I would recommend complete replacement with an AMS 3 piece device.  The products that we reviewed range from the 3 piece device, to the Ambicor, in the malleable.      Plan  1.   Fu in October to discuss where he wants to have surgery    I spent a total of 25 minutes with the patient in face-to-face interaction, with >50% of the time spent in engaging in counseling on the risks, benefits, and alternatives of the therapy and coordinating care.       Nick Sommer MD

## 2019-07-19 ENCOUNTER — HOSPITAL ENCOUNTER (OUTPATIENT)
Dept: INFUSION THERAPY | Facility: HOSPITAL | Age: 69
Setting detail: INFUSION SERIES
Discharge: HOME | End: 2019-07-19

## 2019-07-19 ENCOUNTER — OFFICE VISIT (OUTPATIENT)
Dept: UROLOGY | Facility: CLINIC | Age: 69
End: 2019-07-19

## 2019-07-19 VITALS
RESPIRATION RATE: 18 BRPM | OXYGEN SATURATION: 98 % | DIASTOLIC BLOOD PRESSURE: 65 MMHG | TEMPERATURE: 98 F | HEART RATE: 64 BPM | SYSTOLIC BLOOD PRESSURE: 135 MMHG

## 2019-07-19 VITALS
HEART RATE: 72 BPM | OXYGEN SATURATION: 97 % | BODY MASS INDEX: 25.84 KG/M2 | WEIGHT: 195 LBS | DIASTOLIC BLOOD PRESSURE: 82 MMHG | HEIGHT: 73 IN | SYSTOLIC BLOOD PRESSURE: 134 MMHG

## 2019-07-19 DIAGNOSIS — D75.1 POLYCYTHEMIA: Primary | ICD-10-CM

## 2019-07-19 DIAGNOSIS — E29.1 HYPOGONADISM MALE: ICD-10-CM

## 2019-07-19 DIAGNOSIS — N52.9 ERECTILE DYSFUNCTION, UNSPECIFIED ERECTILE DYSFUNCTION TYPE: ICD-10-CM

## 2019-07-19 DIAGNOSIS — C61 MALIGNANT NEOPLASM OF PROSTATE (HCC): ICD-10-CM

## 2019-07-19 PROCEDURE — 99214 OFFICE O/P EST MOD 30 MIN: CPT | Performed by: UROLOGY

## 2019-07-19 PROCEDURE — 99195 PHLEBOTOMY: CPT

## 2019-07-19 RX ORDER — SODIUM CHLORIDE 9 MG/ML
250 INJECTION, SOLUTION INTRAVENOUS ONCE
Status: CANCELLED | OUTPATIENT
Start: 2019-07-19

## 2019-07-19 RX ORDER — SODIUM CHLORIDE 9 MG/ML
250 INJECTION, SOLUTION INTRAVENOUS ONCE
Status: DISCONTINUED | OUTPATIENT
Start: 2019-07-19 | End: 2019-07-21 | Stop reason: HOSPADM

## 2019-07-19 RX ORDER — FLUTICASONE PROPIONATE 50 MCG
SPRAY, SUSPENSION (ML) NASAL AS NEEDED
COMMUNITY
Start: 2019-04-22 | End: 2020-11-03

## 2019-07-19 NOTE — PROGRESS NOTES
Chief Complaint  Hypogonadism (4 months with labs.)        SIDDHARTHA Jerry is a 69 y.o. male who returns today for follow-up of hypogonadism and low testosterone level.  He understands the need to follow closely for toxicity of therapy particularly in light of his past history of prostate cancer.  Fortunately recent blood work included a PSA that remains near 0.  He is starting to develop polycythemia and will require therapeutic phlebotomy to continue to safely received testosterone supplement.  This is scheduled and then he will return for his next batch of Testopel pellets.  He has derived great clinical benefit and is anxious to continue the supplement.    Unfortunately he has had a malfunction of his penile prosthesis and is requiring a second operation since this is nonfunctional.    Vitals:    07/19/19 0840   BP: 134/82   Pulse: 72   SpO2: 97%       Past Medical History  Past Medical History:   Diagnosis Date   • Depression    • Elevated cholesterol    • Erectile dysfunction    • History of echocardiogram 08/2018   • History of exercise stress test 08/2018   • History of pneumonia 03/21/2019    Reports 40+ years ago   • Hypertension    • Prostate cancer (CMS/[facility]) 2012   • Seasonal allergies     reports mild   • Sleep apnea     Uses CPAP HS - instructed to bring in DOS   • Wears glasses        Past Surgical History  Past Surgical History:   Procedure Laterality Date   • APPENDECTOMY     • CARDIAC CATHETERIZATION N/A 8/20/2018    Procedure: Left Heart Cath;  Surgeon: Anastacia Gary MD;  Location:  [facility];  Service: Cardiology   • COLONOSCOPY     • HEAD/NECK LESION/CYST EXCISION Right 10/31/2016    Procedure: EXCISE BCCA RIGHT SCALP;  Surgeon: Lowell Rider MD;  Location:  [facility];  Service:    • PENILE PROSTHESIS IMPLANT N/A 11/28/2018    Procedure: PENILE PROSTHESIS PLACEMENT;  Surgeon: Nick Sommer MD;  Location:  [facility];  Service: Urology   • PROSTATE SURGERY     • SKIN  BIOPSY      patient reports basal cell carcinoma removed from head x3 places   • TEETH EXTRACTION         Medications  has a current medication list which includes the following prescription(s): amlodipine-benazepril, aspirin, fluticasone, hydrochlorothiazide, metoprolol succinate xl, montelukast, multiple vitamins-minerals, nortriptyline, omeprazole, simvastatin, and venlafaxine xr.      Allergies  Allergies   Allergen Reactions   • Chlorhexidine Rash   • Clindamycin/Lincomycin Rash     itching       Social History  Social History     Social History Narrative   • Not on file       Family History  He has no family history of bladder or kidney cancer  He has no family history of kidney stones      AUA Symptom Score:      Review of Systems  Review of Systems   Constitutional: Negative.    Genitourinary: Negative.    All other systems reviewed and are negative.      Physical Exam  Physical Exam   Constitutional: He is oriented to person, place, and time. He appears well-developed and well-nourished.   HENT:   Head: Normocephalic and atraumatic.   Neck: Normal range of motion.   Pulmonary/Chest: Effort normal. No respiratory distress.   Abdominal: He exhibits no distension and no mass. There is no tenderness. No hernia.   Musculoskeletal: Normal range of motion.   Lymphadenopathy:     He has no cervical adenopathy.   Neurological: He is alert and oriented to person, place, and time.   Skin: Skin is warm and dry.   Psychiatric: He has a normal mood and affect. His behavior is normal.   Vitals reviewed.      Labs Recent and today in the office:  Results for orders placed or performed in visit on 07/16/19   Testosterone   Result Value Ref Range    Testosterone, Total 254 (L) 264 - 916 ng/dL   Estradiol   Result Value Ref Range    Estradiol 17.3 7.6 - 42.6 pg/mL   PSA Screen   Result Value Ref Range    PSA <0.064 0.060 - 4.000 ng/mL   CBC & Differential   Result Value Ref Range    WBC 5.24 3.40 - 10.80 10*3/mm3    RBC 5.95  (H) 4.14 - 5.80 10*6/mm3    Hemoglobin 17.2 13.0 - 17.7 g/dL    Hematocrit 51.5 (H) 37.5 - 51.0 %    MCV 86.6 79.0 - 97.0 fL    MCH 28.9 26.6 - 33.0 pg    MCHC 33.4 31.5 - 35.7 g/dL    RDW 13.6 12.3 - 15.4 %    Platelets 174 140 - 450 10*3/mm3    Neutrophil Rel % 53.6 42.7 - 76.0 %    Lymphocyte Rel % 30.7 19.6 - 45.3 %    Monocyte Rel % 13.2 (H) 5.0 - 12.0 %    Eosinophil Rel % 1.7 0.3 - 6.2 %    Basophil Rel % 0.4 0.0 - 1.5 %    Neutrophils Absolute 2.81 1.70 - 7.00 10*3/mm3    Lymphocytes Absolute 1.61 0.70 - 3.10 10*3/mm3    Monocytes Absolute 0.69 0.10 - 0.90 10*3/mm3    Eosinophils Absolute 0.09 0.00 - 0.40 10*3/mm3    Basophils Absolute 0.02 0.00 - 0.20 10*3/mm3    Immature Granulocyte Rel % 0.4 0.0 - 0.5 %    Immature Grans Absolute 0.02 0.00 - 0.05 10*3/mm3    nRBC 0.0 0.0 - 0.2 /100 WBC         Assessment & Plan  Hypogonadism: He has responded well to supplement with Testopel pellets and is anxious to continue.  He understands the need to critically monitor for toxicity of therapy and will return on a regular basis for follow-up blood work    History of prostate cancer: Fortunately PSA remains undetectable despite testosterone supplement    Polycythemia: This is developing as result of bone marrow stimulation and will need to be treated with therapeutic phlebotomy before he is given additional supplement.  This scheduled

## 2019-07-25 ENCOUNTER — OFFICE VISIT (OUTPATIENT)
Dept: UROLOGY | Facility: CLINIC | Age: 69
End: 2019-07-25

## 2019-07-25 DIAGNOSIS — D75.1 POLYCYTHEMIA: ICD-10-CM

## 2019-07-25 DIAGNOSIS — E29.1 HYPOGONADISM IN MALE: Primary | ICD-10-CM

## 2019-07-25 PROCEDURE — 11980 IMPLANT HORMONE PELLET(S): CPT | Performed by: UROLOGY

## 2019-07-25 NOTE — PROGRESS NOTES
Chief Complaint  Hypogonadism and Testopel        HPI  Claritza is a 69 y.o. male who returns today for insertion of Testopel pellets for hypogonadism.  He was recently evaluated with blood work to monitor for toxicity of therapy and underwent a therapeutic phlebotomy since his last visit.    There were no vitals filed for this visit.    Past Medical History  Past Medical History:   Diagnosis Date   • Depression    • Elevated cholesterol    • Erectile dysfunction    • History of echocardiogram 08/2018   • History of exercise stress test 08/2018   • History of pneumonia 03/21/2019    Reports 40+ years ago   • Hypertension    • Prostate cancer (CMS/HCC) 2012   • Seasonal allergies     reports mild   • Sleep apnea     Uses CPAP HS - instructed to bring in DOS   • Wears glasses        Past Surgical History  Past Surgical History:   Procedure Laterality Date   • APPENDECTOMY     • CARDIAC CATHETERIZATION N/A 8/20/2018    Procedure: Left Heart Cath;  Surgeon: Anastacia Gary MD;  Location:  [facility];  Service: Cardiology   • COLONOSCOPY     • HEAD/NECK LESION/CYST EXCISION Right 10/31/2016    Procedure: EXCISE BCCA RIGHT SCALP;  Surgeon: Lowell Rider MD;  Location:  [facility];  Service:    • PENILE PROSTHESIS IMPLANT N/A 11/28/2018    Procedure: PENILE PROSTHESIS PLACEMENT;  Surgeon: Nick Sommer MD;  Location:  [facility];  Service: Urology   • PROSTATE SURGERY     • SKIN BIOPSY      patient reports basal cell carcinoma removed from head x3 places   • TEETH EXTRACTION         Medications  has a current medication list which includes the following prescription(s): amlodipine-benazepril, aspirin, fluticasone, hydrochlorothiazide, metoprolol succinate xl, montelukast, multiple vitamins-minerals, nortriptyline, omeprazole, simvastatin, and venlafaxine xr.      Allergies  Allergies   Allergen Reactions   • Chlorhexidine Rash   • Clindamycin/Lincomycin Rash     itching       Social History  Social  History     Social History Narrative   • Not on file       Family History  He has no family history of bladder or kidney cancer  He has no family history of kidney stones      AUA Symptom Score:      Review of Systems  Review of Systems    Physical Exam  Physical Exam    Labs Recent and today in the office:  Results for orders placed or performed in visit on 07/16/19   Testosterone   Result Value Ref Range    Testosterone, Total 254 (L) 264 - 916 ng/dL   Estradiol   Result Value Ref Range    Estradiol 17.3 7.6 - 42.6 pg/mL   PSA Screen   Result Value Ref Range    PSA <0.064 0.060 - 4.000 ng/mL   CBC & Differential   Result Value Ref Range    WBC 5.24 3.40 - 10.80 10*3/mm3    RBC 5.95 (H) 4.14 - 5.80 10*6/mm3    Hemoglobin 17.2 13.0 - 17.7 g/dL    Hematocrit 51.5 (H) 37.5 - 51.0 %    MCV 86.6 79.0 - 97.0 fL    MCH 28.9 26.6 - 33.0 pg    MCHC 33.4 31.5 - 35.7 g/dL    RDW 13.6 12.3 - 15.4 %    Platelets 174 140 - 450 10*3/mm3    Neutrophil Rel % 53.6 42.7 - 76.0 %    Lymphocyte Rel % 30.7 19.6 - 45.3 %    Monocyte Rel % 13.2 (H) 5.0 - 12.0 %    Eosinophil Rel % 1.7 0.3 - 6.2 %    Basophil Rel % 0.4 0.0 - 1.5 %    Neutrophils Absolute 2.81 1.70 - 7.00 10*3/mm3    Lymphocytes Absolute 1.61 0.70 - 3.10 10*3/mm3    Monocytes Absolute 0.69 0.10 - 0.90 10*3/mm3    Eosinophils Absolute 0.09 0.00 - 0.40 10*3/mm3    Basophils Absolute 0.02 0.00 - 0.20 10*3/mm3    Immature Granulocyte Rel % 0.4 0.0 - 0.5 %    Immature Grans Absolute 0.02 0.00 - 0.05 10*3/mm3    nRBC 0.0 0.0 - 0.2 /100 WBC     Testopel Subcutaneous hormone pellet implantation:    The patient was placed on the exam table in the supine position.  The upper outer quadrant of the hip was identified for insertion.  The area was then prepped with Betadine and injected with 7ml of Lidocaine 2% with Epinephrine to anesthetize superficially and then distally along the trocar tract.  A 3mm incision was made using #11 blade scalpel.  The trocar with a sharp-ended stylet was  inserted into the subcutaneous tissue in line with the femur.  The sharp stylet was withdrawn and the Testopel pellets were placed into the well of the trocar.  Testopel was advanced into the tissue using blunt-ended stylet.  A total of 12 pellets were inserted. For the J-code of  the NDC # for the pellets is 28575-538-18. The trocar was removed and the incision was closed using Steri-strips.  The wound area was cleansed to remove the Betadine and was covered with Steri-strips with an outer Band-aid.  Two subcutaneous tract were developed.  Careful inspection of the area was done.            Assessment & Plan  The patient was informed of the post-procedure instructions.  He will return in 1 month to monitor testosterone levels and then again at 4 months to determine scheduling of the next insertion.

## 2019-10-29 ENCOUNTER — OFFICE VISIT (OUTPATIENT)
Dept: CARDIOLOGY | Facility: CLINIC | Age: 69
End: 2019-10-29

## 2019-10-29 VITALS
HEART RATE: 76 BPM | HEIGHT: 73 IN | WEIGHT: 217.2 LBS | BODY MASS INDEX: 28.79 KG/M2 | DIASTOLIC BLOOD PRESSURE: 65 MMHG | SYSTOLIC BLOOD PRESSURE: 105 MMHG | OXYGEN SATURATION: 95 %

## 2019-10-29 DIAGNOSIS — E78.2 MIXED DYSLIPIDEMIA: ICD-10-CM

## 2019-10-29 DIAGNOSIS — I25.10 CORONARY ARTERY DISEASE INVOLVING NATIVE CORONARY ARTERY OF NATIVE HEART WITHOUT ANGINA PECTORIS: Primary | ICD-10-CM

## 2019-10-29 DIAGNOSIS — I10 ESSENTIAL HYPERTENSION: ICD-10-CM

## 2019-10-29 PROCEDURE — 99214 OFFICE O/P EST MOD 30 MIN: CPT | Performed by: INTERNAL MEDICINE

## 2019-10-29 NOTE — PROGRESS NOTES
Encounter Date:10/29/2019      Patient ID: José Miguel Jerry is a 69 y.o. male.    Hernandez Jackman MD    Chief Complaint: Coronary Artery Disease      PROBLEM LIST:  Patient Active Problem List    Diagnosis    • CAD (coronary artery disease)      Priority: High     Note Last Updated: 10/29/2019     1. [facility] 8-20-18  · Nonobstructive plaque disease involving multiple vessels without evidence of hemodynamically significant coronary artery disease.  · Normal left ventricular systolic function, ejection fraction 60%.     • Abnormal stress test      Priority: High     Note Last Updated: 8/20/2018     1. [facility] 8-8-18:  · Positive EKG changes  · Reversible Inferior ischemia  · EF 60%       • Hypertension      Priority: Medium   • Mixed dyslipidemia      Priority: Medium   • GERD (gastroesophageal reflux disease)      Priority: Low   • Polycythemia    • Dysesthesia    • Neuropathic pain    • Erectile dysfunction after radical prostatectomy    • Prostate cancer (CMS/HCC)    • Low testosterone            History of Present Illness  Patient presents today for follow-up with a history of nonobstructive coronary disease, hypertension, dyslipidemia.  He returns today for annual follow-up with no complaint of exertional chest pain or dyspnea and orthopnea no PND no claudication no lower extremity edema.  He has no awareness of tachyarrhythmias, no dizziness or syncope.  He does not check his blood pressure regularly at home but reports it is always been good on his quarterly visits with primary care.  He had a lipid panel within the past 1 or 2 weeks with primary care that was reported favorable.  States compliance with current medical regimen reports no significant side effects.    Allergies   Allergen Reactions   • Chlorhexidine Rash   • Clindamycin/Lincomycin Rash     itching         Current Outpatient Medications:   •  amLODIPine-benazepril (LOTREL) 10-40 MG per capsule, Take 1 capsule by mouth Daily., Disp: , Rfl:  "  •  aspirin 81 MG EC tablet, Take 81 mg by mouth Daily., Disp: , Rfl:   •  fluticasone (FLONASE) 50 MCG/ACT nasal spray, As Needed., Disp: , Rfl:   •  hydrochlorothiazide (MICROZIDE) 12.5 MG capsule, Take 12.5 mg by mouth Every Morning., Disp: , Rfl:   •  metoprolol succinate XL (TOPROL-XL) 25 MG 24 hr tablet, Take 1 tablet by mouth Daily., Disp: 30 tablet, Rfl: 6  •  montelukast (SINGULAIR) 10 MG tablet, Take 10 mg by mouth Every Night., Disp: , Rfl:   •  Multiple Vitamins-Minerals (MULTIVITAMIN ADULTS PO), Take 1 tablet by mouth Daily., Disp: , Rfl:   •  nortriptyline (PAMELOR) 10 MG capsule, Take 30 mg by mouth Every Night., Disp: , Rfl:   •  omeprazole (priLOSEC) 20 MG capsule, Take 20 mg by mouth Daily., Disp: , Rfl:   •  simvastatin (ZOCOR) 20 MG tablet, Take 20 mg by mouth Every Night., Disp: , Rfl:   •  venlafaxine XR (EFFEXOR-XR) 150 MG 24 hr capsule, Take 150 mg by mouth Daily., Disp: , Rfl:     The following portions of the patient's history were reviewed and updated as appropriate: allergies, current medications, past family history, past medical history, past social history, past surgical history and problem list.    Review of Systems   Constitution: Negative for diaphoresis, weakness, malaise/fatigue and weight gain.   Cardiovascular: Negative for chest pain, claudication, dyspnea on exertion, irregular heartbeat, leg swelling, orthopnea, palpitations, paroxysmal nocturnal dyspnea and syncope.   Respiratory: Negative for cough, shortness of breath, sleep disturbances due to breathing and wheezing.    Hematologic/Lymphatic: Negative for bleeding problem.   Musculoskeletal: Negative for muscle cramps, muscle weakness and myalgias.   Gastrointestinal: Negative for heartburn.     .    Objective:     /65 (BP Location: Left arm, Patient Position: Sitting)   Pulse 76   Ht 185.4 cm (73\")   Wt 98.5 kg (217 lb 3.2 oz)   SpO2 95%   BMI 28.66 kg/m²    Body mass index is 28.66 kg/m².     Physical Exam "   Constitutional: He is oriented to person, place, and time. He appears well-developed and well-nourished.   Cardiovascular: Normal rate, regular rhythm, normal heart sounds and intact distal pulses. Exam reveals no gallop and no friction rub.   No murmur heard.  Pulmonary/Chest: Effort normal and breath sounds normal. No respiratory distress. He has no wheezes. He has no rales. He exhibits no tenderness.   Bases clear   Musculoskeletal: Normal range of motion. He exhibits no edema.   Neurological: He is alert and oriented to person, place, and time.       Lab Review:                     Lab Results   Component Value Date    HGBA1C 5.70 (H) 08/19/2018     Lab Results   Component Value Date    TRIG 81 08/20/2018     Lab Results   Component Value Date    HDL 34 (L) 08/20/2018     Lab Results   Component Value Date    LDL 75 08/20/2018         Procedures             Assessment:      Diagnosis Plan   1. Coronary artery disease involving native coronary artery of native heart without angina pectoris   no current anginal symptoms, continue current medical regimen   2. Essential hypertension  Well managed,  continue current therapy.     3. Mixed dyslipidemia   on statin therapy, monitored by primary care and reported to be favorable     Plan:     Stable cardiac status.  Continue current medications.   in 1 year, sooner as needed.  Thank you for allowing us to participate in the care of your patient.     Electronically signed by AMBER Corey, 10/29/19, 9:28 AM.      Please note that portions of this note may have been completed with a voice recognition program. Efforts were made to edit the dictations, but occasionally words are mistr

## 2019-11-25 ENCOUNTER — OFFICE VISIT (OUTPATIENT)
Dept: UROLOGY | Facility: CLINIC | Age: 69
End: 2019-11-25

## 2019-11-25 VITALS
HEART RATE: 76 BPM | DIASTOLIC BLOOD PRESSURE: 78 MMHG | SYSTOLIC BLOOD PRESSURE: 116 MMHG | BODY MASS INDEX: 28.76 KG/M2 | HEIGHT: 73 IN | OXYGEN SATURATION: 99 % | WEIGHT: 217 LBS

## 2019-11-25 DIAGNOSIS — E29.1 HYPOGONADISM IN MALE: Primary | ICD-10-CM

## 2019-11-25 DIAGNOSIS — Z12.5 SCREENING PSA (PROSTATE SPECIFIC ANTIGEN): ICD-10-CM

## 2019-11-25 DIAGNOSIS — C61 PROSTATE CANCER (HCC): ICD-10-CM

## 2019-11-25 PROCEDURE — 99214 OFFICE O/P EST MOD 30 MIN: CPT | Performed by: UROLOGY

## 2019-11-25 RX ORDER — VORTIOXETINE 10 MG/1
TABLET, FILM COATED ORAL DAILY
Status: ON HOLD | COMMUNITY
Start: 2019-10-11 | End: 2021-12-29

## 2019-11-25 NOTE — PROGRESS NOTES
Chief Complaint  Hypogonadism (follow up labs.)        SIDDHARTHA Jerry is a 69 y.o. male who returns today for follow-up of hypogonadism being treated with periodic insertion of Testopel pellet implants.  Is been 4 months since his last batch and he returns today with a testosterone still normal although on the lower hand.  He understands the critical need to monitor for toxicity of therapy and is a periodic blood donor in order to avoid polycythemia.  He originally had to take Arimidex for elevated estrogen but is been normal for a while.  He is more than 5 years after prostatectomy for prostate cancer and has had a PSA consistently near 0 ever since.  He describes increase in his strength stamina energy level of productivity and libido with supplement and therefore is anxious to continue.  Vitals:    11/25/19 0845   BP: 116/78   Pulse: 76   SpO2: 99%       Past Medical History  Past Medical History:   Diagnosis Date   • Depression    • Elevated cholesterol    • Erectile dysfunction    • History of echocardiogram 08/2018   • History of exercise stress test 08/2018   • History of pneumonia 03/21/2019    Reports 40+ years ago   • Hypertension    • Prostate cancer (CMS/[facility]) 2012   • Seasonal allergies     reports mild   • Sleep apnea     Uses CPAP HS - instructed to bring in DOS   • Wears glasses        Past Surgical History  Past Surgical History:   Procedure Laterality Date   • APPENDECTOMY     • CARDIAC CATHETERIZATION N/A 8/20/2018    Procedure: Left Heart Cath;  Surgeon: Anastacia Gary MD;  Location:  [facility];  Service: Cardiology   • COLONOSCOPY     • HEAD/NECK LESION/CYST EXCISION Right 10/31/2016    Procedure: EXCISE BCCA RIGHT SCALP;  Surgeon: Lowell Rider MD;  Location:  [facility];  Service:    • PENILE PROSTHESIS IMPLANT N/A 11/28/2018    Procedure: PENILE PROSTHESIS PLACEMENT;  Surgeon: Nick Sommer MD;  Location:  [facility];  Service: Urology   • PROSTATE SURGERY     • SKIN  BIOPSY      patient reports basal cell carcinoma removed from head x3 places   • TEETH EXTRACTION         Medications  has a current medication list which includes the following prescription(s): amlodipine-benazepril, aspirin, fluticasone, hydrochlorothiazide, metoprolol succinate xl, montelukast, multiple vitamins-minerals, nortriptyline, omeprazole, simvastatin, trintellix, and venlafaxine xr.      Allergies  Allergies   Allergen Reactions   • Chlorhexidine Rash   • Clindamycin/Lincomycin Rash     itching       Social History  Social History     Socioeconomic History   • Marital status:      Spouse name: Not on file   • Number of children: Not on file   • Years of education: Not on file   • Highest education level: Not on file   Occupational History     Employer: RETIRED   Tobacco Use   • Smoking status: Never Smoker   • Smokeless tobacco: Never Used   Substance and Sexual Activity   • Alcohol use: Yes     Comment: Social use, reports no HX of abuse   • Drug use: No   • Sexual activity: Yes       Family History  He has no family history of bladder or kidney cancer  He has no family history of kidney stones      AUA Symptom Score:      Review of Systems  Review of Systems   Constitutional: Negative for activity change, appetite change, chills, fatigue, fever, unexpected weight gain and unexpected weight loss.   Respiratory: Negative for apnea, cough, chest tightness, shortness of breath, wheezing and stridor.    Cardiovascular: Negative for chest pain, palpitations and leg swelling.   Gastrointestinal: Negative for abdominal distention, abdominal pain, anal bleeding, blood in stool, constipation, diarrhea, nausea, rectal pain, vomiting, GERD and indigestion.   Genitourinary: Negative for decreased libido, decreased urine volume, difficulty urinating, discharge, dysuria, flank pain, frequency, genital sores, hematuria, nocturia, penile pain, erectile dysfunction, penile swelling, scrotal swelling, testicular  pain, urgency and urinary incontinence.   Musculoskeletal: Negative for back pain and joint swelling.   Neurological: Negative for tremors, seizures, speech difficulty, weakness and numbness.   Psychiatric/Behavioral: Negative for agitation, decreased concentration, sleep disturbance, depressed mood and stress. The patient is not nervous/anxious.        Physical Exam  Physical Exam   Constitutional: He is oriented to person, place, and time. He appears well-developed and well-nourished.   HENT:   Head: Normocephalic and atraumatic.   Neck: Normal range of motion.   Pulmonary/Chest: Effort normal. No respiratory distress.   Abdominal: He exhibits no distension and no mass. There is no tenderness. No hernia.   Musculoskeletal: Normal range of motion.   Lymphadenopathy:     He has no cervical adenopathy.   Neurological: He is alert and oriented to person, place, and time.   Skin: Skin is warm and dry.   Psychiatric: He has a normal mood and affect. His behavior is normal.   Vitals reviewed.      Labs Recent and today in the office:  Results for orders placed or performed in visit on 07/16/19   Testosterone   Result Value Ref Range    Testosterone, Total 254 (L) 264 - 916 ng/dL   Estradiol   Result Value Ref Range    Estradiol 17.3 7.6 - 42.6 pg/mL   PSA Screen   Result Value Ref Range    PSA <0.064 0.060 - 4.000 ng/mL   CBC & Differential   Result Value Ref Range    WBC 5.24 3.40 - 10.80 10*3/mm3    RBC 5.95 (H) 4.14 - 5.80 10*6/mm3    Hemoglobin 17.2 13.0 - 17.7 g/dL    Hematocrit 51.5 (H) 37.5 - 51.0 %    MCV 86.6 79.0 - 97.0 fL    MCH 28.9 26.6 - 33.0 pg    MCHC 33.4 31.5 - 35.7 g/dL    RDW 13.6 12.3 - 15.4 %    Platelets 174 140 - 450 10*3/mm3    Neutrophil Rel % 53.6 42.7 - 76.0 %    Lymphocyte Rel % 30.7 19.6 - 45.3 %    Monocyte Rel % 13.2 (H) 5.0 - 12.0 %    Eosinophil Rel % 1.7 0.3 - 6.2 %    Basophil Rel % 0.4 0.0 - 1.5 %    Neutrophils Absolute 2.81 1.70 - 7.00 10*3/mm3    Lymphocytes Absolute 1.61 0.70 -  3.10 10*3/mm3    Monocytes Absolute 0.69 0.10 - 0.90 10*3/mm3    Eosinophils Absolute 0.09 0.00 - 0.40 10*3/mm3    Basophils Absolute 0.02 0.00 - 0.20 10*3/mm3    Immature Granulocyte Rel % 0.4 0.0 - 0.5 %    Immature Grans Absolute 0.02 0.00 - 0.05 10*3/mm3    nRBC 0.0 0.0 - 0.2 /100 WBC         Assessment & Plan  Hypogonadism: He has achieved an excellent benefit from supplement and is anxious to continue with the pellets.  It is been 4 months and his testosterone is dropped down near the lower level so I suggested he reschedule the next insertion within about a month.  He is anxious to get this done before the end of the year for deductible reasons.  He understands a critical need to monitor for toxicity therapy and will return for blood work.

## 2020-01-16 ENCOUNTER — OFFICE VISIT (OUTPATIENT)
Dept: UROLOGY | Facility: CLINIC | Age: 70
End: 2020-01-16

## 2020-01-16 VITALS
RESPIRATION RATE: 14 BRPM | HEART RATE: 61 BPM | TEMPERATURE: 98.3 F | DIASTOLIC BLOOD PRESSURE: 64 MMHG | OXYGEN SATURATION: 98 % | SYSTOLIC BLOOD PRESSURE: 127 MMHG

## 2020-01-16 DIAGNOSIS — C61 PROSTATE CANCER (HCC): ICD-10-CM

## 2020-01-16 DIAGNOSIS — Z12.5 SCREENING PSA (PROSTATE SPECIFIC ANTIGEN): ICD-10-CM

## 2020-01-16 DIAGNOSIS — E29.1 HYPOGONADISM IN MALE: ICD-10-CM

## 2020-01-16 DIAGNOSIS — E29.1 HYPOGONADISM MALE: Primary | ICD-10-CM

## 2020-01-16 PROCEDURE — 11980 IMPLANT HORMONE PELLET(S): CPT | Performed by: UROLOGY

## 2020-01-16 NOTE — PROGRESS NOTES
Chief Complaint  Hypogonadism (Testopel (12))        SIDDHARTHA Jerry is a 69 y.o. male who returns today for follow-up of hypogonadism and low testosterone level.  He is achieved great clinical benefit from supplement and is anxious to continue.  He has a distant history of prostate cancer but with no evidence of recurrence in years as judged by PSA near 0.  He has a tendency to develop polycythemia which is handled with periodic blood donations.  Occasionally he has an elevated estrogen and takes Arimidex but not recently.    Vitals:    01/16/20 1021   BP: 127/64   Pulse: 61   Resp: 14   Temp: 98.3 °F (36.8 °C)   SpO2: 98%       Past Medical History  Past Medical History:   Diagnosis Date   • Depression    • Elevated cholesterol    • Erectile dysfunction    • History of echocardiogram 08/2018   • History of exercise stress test 08/2018   • History of pneumonia 03/21/2019    Reports 40+ years ago   • Hypertension    • Prostate cancer (CMS/[facility]) 2012   • Seasonal allergies     reports mild   • Sleep apnea     Uses CPAP HS - instructed to bring in DOS   • Wears glasses        Past Surgical History  Past Surgical History:   Procedure Laterality Date   • APPENDECTOMY     • CARDIAC CATHETERIZATION N/A 8/20/2018    Procedure: Left Heart Cath;  Surgeon: Anastacia Gary MD;  Location:  [facility];  Service: Cardiology   • COLONOSCOPY     • HEAD/NECK LESION/CYST EXCISION Right 10/31/2016    Procedure: EXCISE BCCA RIGHT SCALP;  Surgeon: Lowell Rider MD;  Location:  [facility];  Service:    • PENILE PROSTHESIS IMPLANT N/A 11/28/2018    Procedure: PENILE PROSTHESIS PLACEMENT;  Surgeon: Nick Sommer MD;  Location:  [facility];  Service: Urology   • PROSTATE SURGERY     • SKIN BIOPSY      patient reports basal cell carcinoma removed from head x3 places   • TEETH EXTRACTION         Medications  has a current medication list which includes the following prescription(s): amlodipine-benazepril, aspirin,  fluticasone, hydrochlorothiazide, metoprolol succinate xl, montelukast, multiple vitamins-minerals, nortriptyline, omeprazole, simvastatin, trintellix, and venlafaxine xr.      Allergies  Allergies   Allergen Reactions   • Chlorhexidine Rash   • Clindamycin/Lincomycin Rash     itching       Social History  Social History     Socioeconomic History   • Marital status:      Spouse name: Not on file   • Number of children: Not on file   • Years of education: Not on file   • Highest education level: Not on file   Occupational History     Employer: RETIRED   Tobacco Use   • Smoking status: Never Smoker   • Smokeless tobacco: Never Used   Substance and Sexual Activity   • Alcohol use: Yes     Comment: Social use, reports no HX of abuse   • Drug use: No   • Sexual activity: Yes       Family History  He has no family history of bladder or kidney cancer  He has no family history of kidney stones      AUA Symptom Score:      Review of Systems  Review of Systems    Physical Exam  Physical Exam    Labs Recent and today in the office:  Results for orders placed or performed in visit on 07/16/19   Testosterone   Result Value Ref Range    Testosterone, Total 254 (L) 264 - 916 ng/dL   Estradiol   Result Value Ref Range    Estradiol 17.3 7.6 - 42.6 pg/mL   PSA Screen   Result Value Ref Range    PSA <0.064 0.060 - 4.000 ng/mL   CBC & Differential   Result Value Ref Range    WBC 5.24 3.40 - 10.80 10*3/mm3    RBC 5.95 (H) 4.14 - 5.80 10*6/mm3    Hemoglobin 17.2 13.0 - 17.7 g/dL    Hematocrit 51.5 (H) 37.5 - 51.0 %    MCV 86.6 79.0 - 97.0 fL    MCH 28.9 26.6 - 33.0 pg    MCHC 33.4 31.5 - 35.7 g/dL    RDW 13.6 12.3 - 15.4 %    Platelets 174 140 - 450 10*3/mm3    Neutrophil Rel % 53.6 42.7 - 76.0 %    Lymphocyte Rel % 30.7 19.6 - 45.3 %    Monocyte Rel % 13.2 (H) 5.0 - 12.0 %    Eosinophil Rel % 1.7 0.3 - 6.2 %    Basophil Rel % 0.4 0.0 - 1.5 %    Neutrophils Absolute 2.81 1.70 - 7.00 10*3/mm3    Lymphocytes Absolute 1.61 0.70 -  3.10 10*3/mm3    Monocytes Absolute 0.69 0.10 - 0.90 10*3/mm3    Eosinophils Absolute 0.09 0.00 - 0.40 10*3/mm3    Basophils Absolute 0.02 0.00 - 0.20 10*3/mm3    Immature Granulocyte Rel % 0.4 0.0 - 0.5 %    Immature Grans Absolute 0.02 0.00 - 0.05 10*3/mm3    nRBC 0.0 0.0 - 0.2 /100 WBC     Testopel Subcutaneous hormone pellet implantation:    The patient was placed on the exam table in the supine position.  The upper outer quadrant of the hip was identified for insertion.  The area was then prepped with Betadine and injected with 7ml of Lidocaine 2% with Epinephrine to anesthetize superficially and then distally along the trocar tract.  A 3mm incision was made using #11 blade scalpel.  The trocar with a sharp-ended stylet was inserted into the subcutaneous tissue in line with the femur.  The sharp stylet was withdrawn and the Testopel pellets were placed into the well of the trocar.  Testopel was advanced into the tissue using blunt-ended stylet.  A total of 12 pellets were inserted. For the J-code of  the NDC # for the pellets is 26141-536-52. The trocar was removed and the incision was closed using Steri-strips.  The wound area was cleansed to remove the Betadine and was covered with Steri-strips with an outer Band-aid.  Two subcutaneous tract were developed.  Careful inspection of the area was done.              Assessment & Plan  Hypogonadism: 12 pellets are inserted in the conventional fashion.The patient was informed of the post-procedure instructions.  He will return in 2 month to monitor blood levels and then again at 4 months to determine scheduling of the next insertion.

## 2020-06-19 ENCOUNTER — LAB (OUTPATIENT)
Dept: UROLOGY | Facility: CLINIC | Age: 70
End: 2020-06-19

## 2020-06-19 DIAGNOSIS — E29.1 HYPOGONADISM IN MALE: Primary | ICD-10-CM

## 2020-06-20 LAB
BASOPHILS # BLD AUTO: 0.01 10*3/MM3 (ref 0–0.2)
BASOPHILS NFR BLD AUTO: 0.2 % (ref 0–1.5)
EOSINOPHIL # BLD AUTO: 0.35 10*3/MM3 (ref 0–0.4)
EOSINOPHIL NFR BLD AUTO: 6.9 % (ref 0.3–6.2)
ERYTHROCYTE [DISTWIDTH] IN BLOOD BY AUTOMATED COUNT: 18 % (ref 12.3–15.4)
ESTRADIOL SERPL-MCNC: 14 PG/ML (ref 7.6–42.6)
HCT VFR BLD AUTO: 48.3 % (ref 37.5–51)
HGB BLD-MCNC: 15.6 G/DL (ref 13–17.7)
IMM GRANULOCYTES # BLD AUTO: 0.01 10*3/MM3 (ref 0–0.05)
IMM GRANULOCYTES NFR BLD AUTO: 0.2 % (ref 0–0.5)
LYMPHOCYTES # BLD AUTO: 1.5 10*3/MM3 (ref 0.7–3.1)
LYMPHOCYTES NFR BLD AUTO: 29.4 % (ref 19.6–45.3)
MCH RBC QN AUTO: 26.3 PG (ref 26.6–33)
MCHC RBC AUTO-ENTMCNC: 32.3 G/DL (ref 31.5–35.7)
MCV RBC AUTO: 81.3 FL (ref 79–97)
MONOCYTES # BLD AUTO: 0.59 10*3/MM3 (ref 0.1–0.9)
MONOCYTES NFR BLD AUTO: 11.6 % (ref 5–12)
NEUTROPHILS # BLD AUTO: 2.64 10*3/MM3 (ref 1.7–7)
NEUTROPHILS NFR BLD AUTO: 51.7 % (ref 42.7–76)
NRBC BLD AUTO-RTO: 0 /100 WBC (ref 0–0.2)
PLATELET # BLD AUTO: 205 10*3/MM3 (ref 140–450)
RBC # BLD AUTO: 5.94 10*6/MM3 (ref 4.14–5.8)
TESTOST SERPL-MCNC: 157 NG/DL (ref 264–916)
WBC # BLD AUTO: 5.1 10*3/MM3 (ref 3.4–10.8)

## 2020-06-23 ENCOUNTER — OFFICE VISIT (OUTPATIENT)
Dept: UROLOGY | Facility: CLINIC | Age: 70
End: 2020-06-23

## 2020-06-23 VITALS
TEMPERATURE: 98.4 F | HEIGHT: 73 IN | WEIGHT: 217 LBS | OXYGEN SATURATION: 99 % | SYSTOLIC BLOOD PRESSURE: 122 MMHG | HEART RATE: 78 BPM | BODY MASS INDEX: 28.76 KG/M2 | RESPIRATION RATE: 18 BRPM | DIASTOLIC BLOOD PRESSURE: 68 MMHG

## 2020-06-23 DIAGNOSIS — E29.1 HYPOGONADISM IN MALE: Primary | ICD-10-CM

## 2020-06-23 PROCEDURE — 99214 OFFICE O/P EST MOD 30 MIN: CPT | Performed by: UROLOGY

## 2020-06-23 PROCEDURE — 81003 URINALYSIS AUTO W/O SCOPE: CPT | Performed by: UROLOGY

## 2020-06-23 RX ORDER — ANASTROZOLE 1 MG/1
1 TABLET ORAL
Qty: 12 TABLET | Refills: 1 | Status: SHIPPED | OUTPATIENT
Start: 2020-06-23 | End: 2022-03-15

## 2020-06-23 RX ORDER — TESTOSTERONE CYPIONATE 200 MG/ML
400 INJECTION, SOLUTION INTRAMUSCULAR
Qty: 10 ML | Refills: 2 | Status: SHIPPED | OUTPATIENT
Start: 2020-06-23 | End: 2020-11-03

## 2020-06-23 RX ORDER — SYRINGE W-NEEDLE,DISPOSAB,3 ML 25GX5/8"
2 SYRINGE, EMPTY DISPOSABLE MISCELLANEOUS
Qty: 25 EACH | Refills: 3 | Status: SHIPPED | OUTPATIENT
Start: 2020-06-23 | End: 2022-05-04

## 2020-07-20 DIAGNOSIS — N52.31 ERECTILE DYSFUNCTION AFTER RADICAL PROSTATECTOMY: Primary | ICD-10-CM

## 2020-10-12 ENCOUNTER — TELEPHONE (OUTPATIENT)
Dept: UROLOGY | Facility: CLINIC | Age: 70
End: 2020-10-12

## 2020-10-12 DIAGNOSIS — E29.1 HYPOGONADISM IN MALE: Primary | ICD-10-CM

## 2020-10-12 RX ORDER — SYRINGE W-NEEDLE,DISPOSAB,3 ML 25GX5/8"
2 SYRINGE, EMPTY DISPOSABLE MISCELLANEOUS
Qty: 50 EACH | Refills: 3 | Status: SHIPPED | OUTPATIENT
Start: 2020-10-12 | End: 2022-05-04

## 2020-10-21 ENCOUNTER — LAB (OUTPATIENT)
Dept: UROLOGY | Facility: CLINIC | Age: 70
End: 2020-10-21

## 2020-10-21 DIAGNOSIS — E29.1 HYPOGONADISM IN MALE: ICD-10-CM

## 2020-10-21 DIAGNOSIS — C61 PROSTATE CANCER (HCC): Primary | ICD-10-CM

## 2020-10-22 LAB
BASOPHILS # BLD AUTO: 0.03 10*3/MM3 (ref 0–0.2)
BASOPHILS NFR BLD AUTO: 0.5 % (ref 0–1.5)
EOSINOPHIL # BLD AUTO: 0.22 10*3/MM3 (ref 0–0.4)
EOSINOPHIL NFR BLD AUTO: 3.6 % (ref 0.3–6.2)
ERYTHROCYTE [DISTWIDTH] IN BLOOD BY AUTOMATED COUNT: 14.1 % (ref 12.3–15.4)
ESTRADIOL SERPL-MCNC: 24.7 PG/ML (ref 7.6–42.6)
HCT VFR BLD AUTO: 48.9 % (ref 37.5–51)
HGB BLD-MCNC: 15.8 G/DL (ref 13–17.7)
IMM GRANULOCYTES # BLD AUTO: 0.03 10*3/MM3 (ref 0–0.05)
IMM GRANULOCYTES NFR BLD AUTO: 0.5 % (ref 0–0.5)
LYMPHOCYTES # BLD AUTO: 1.54 10*3/MM3 (ref 0.7–3.1)
LYMPHOCYTES NFR BLD AUTO: 25 % (ref 19.6–45.3)
MCH RBC QN AUTO: 27.7 PG (ref 26.6–33)
MCHC RBC AUTO-ENTMCNC: 32.3 G/DL (ref 31.5–35.7)
MCV RBC AUTO: 85.6 FL (ref 79–97)
MONOCYTES # BLD AUTO: 0.51 10*3/MM3 (ref 0.1–0.9)
MONOCYTES NFR BLD AUTO: 8.3 % (ref 5–12)
NEUTROPHILS # BLD AUTO: 3.84 10*3/MM3 (ref 1.7–7)
NEUTROPHILS NFR BLD AUTO: 62.1 % (ref 42.7–76)
NRBC BLD AUTO-RTO: 0 /100 WBC (ref 0–0.2)
PLATELET # BLD AUTO: 261 10*3/MM3 (ref 140–450)
PSA SERPL-MCNC: <0.014 NG/ML (ref 0–4)
RBC # BLD AUTO: 5.71 10*6/MM3 (ref 4.14–5.8)
TESTOST SERPL-MCNC: 719 NG/DL (ref 264–916)
WBC # BLD AUTO: 6.17 10*3/MM3 (ref 3.4–10.8)

## 2020-10-23 ENCOUNTER — OFFICE VISIT (OUTPATIENT)
Dept: UROLOGY | Facility: CLINIC | Age: 70
End: 2020-10-23

## 2020-10-23 DIAGNOSIS — N52.31 ERECTILE DYSFUNCTION AFTER RADICAL PROSTATECTOMY: ICD-10-CM

## 2020-10-23 DIAGNOSIS — C61 PROSTATE CANCER (HCC): Primary | ICD-10-CM

## 2020-10-23 DIAGNOSIS — E29.1 HYPOGONADISM IN MALE: ICD-10-CM

## 2020-10-23 PROCEDURE — 99214 OFFICE O/P EST MOD 30 MIN: CPT | Performed by: UROLOGY

## 2020-10-23 RX ORDER — TESTOSTERONE CYPIONATE 200 MG/ML
400 INJECTION, SOLUTION INTRAMUSCULAR
Qty: 4 ML | Refills: 2 | Status: SHIPPED | OUTPATIENT
Start: 2020-10-23 | End: 2021-01-18

## 2020-10-23 NOTE — PROGRESS NOTES
Chief Complaint  Hypogonadism (3 month follow up with labs.)        SIDDHARTHA eJrry is a 70 y.o. male who returns today for usual follow-up with a history of hypogonadism having had an excellent response to supplement and anxious to continue.  He self injecting 400 mg every 21 days at home with good effect.  He has a past history of prostate cancer treated with prostatectomy and no evidence of recurrence as judged by PSA near 0 ever since.  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  He denies any difficulty voiding.    His erectile function is a different matter.  He is not only failed all the usual efforts like Viagra external erection device and intracorporal injection with Trimix he also underwent penile prosthesis first by my partner and then by  which likewise was not satisfactory.  His biggest disappointment is that did not make his penis any bigger even though it stiffer and he finds it unsatisfactory.    There were no vitals filed for this visit.    Past Medical History  Past Medical History:   Diagnosis Date   • Depression    • Elevated cholesterol    • Erectile dysfunction    • History of echocardiogram 08/2018   • History of exercise stress test 08/2018   • History of pneumonia 03/21/2019    Reports 40+ years ago   • Hypertension    • Prostate cancer (CMS/[facility]) 2012   • Seasonal allergies     reports mild   • Sleep apnea     Uses CPAP HS - instructed to bring in DOS   • Wears glasses        Past Surgical History  Past Surgical History:   Procedure Laterality Date   • APPENDECTOMY     • CARDIAC CATHETERIZATION N/A 8/20/2018    Procedure: Left Heart Cath;  Surgeon: Anastacia Gary MD;  Location:  [facility];  Service: Cardiology   • COLONOSCOPY     • HEAD/NECK LESION/CYST EXCISION Right 10/31/2016    Procedure: EXCISE BCCA RIGHT SCALP;  Surgeon: Lowell Rider MD;  Location:  [facility];  Service:    • PENILE PROSTHESIS  IMPLANT N/A 11/28/2018    Procedure: PENILE PROSTHESIS PLACEMENT;  Surgeon: Nick Sommer MD;  Location: [facility];  Service: Urology   • PROSTATE SURGERY     • SKIN BIOPSY      patient reports basal cell carcinoma removed from head x3 places   • TEETH EXTRACTION         Medications  has a current medication list which includes the following prescription(s): amlodipine-benazepril, anastrozole, aspirin, fluticasone, hydrochlorothiazide, metoprolol succinate xl, montelukast, multiple vitamins-minerals, needle (disp), nortriptyline, omeprazole, simvastatin, syringe, syringe, testosterone cypionate, trintellix, and venlafaxine xr.      Allergies  Allergies   Allergen Reactions   • Chlorhexidine Rash   • Clindamycin/Lincomycin Rash     itching       Social History  Social History     Socioeconomic History   • Marital status:      Spouse name: Not on file   • Number of children: Not on file   • Years of education: Not on file   • Highest education level: Not on file   Occupational History     Employer: RETIRED   Tobacco Use   • Smoking status: Never Smoker   • Smokeless tobacco: Never Used   Substance and Sexual Activity   • Alcohol use: Yes     Comment: Social use, reports no HX of abuse   • Drug use: No   • Sexual activity: Yes       Family History  He has no family history of bladder or kidney cancer  He has no family history of kidney stones      AUA Symptom Score:      Review of Systems  Review of Systems   Constitutional: Negative for activity change, appetite change, chills, fatigue, fever, unexpected weight gain and unexpected weight loss.   Respiratory: Negative for apnea, cough, chest tightness, shortness of breath, wheezing and stridor.    Cardiovascular: Negative for chest pain, palpitations and leg swelling.   Gastrointestinal: Negative for abdominal distention, abdominal pain, anal bleeding, blood in stool, constipation, diarrhea, nausea, rectal pain, vomiting, GERD and indigestion.    Genitourinary: Negative for decreased libido, decreased urine volume, difficulty urinating, discharge, dysuria, flank pain, frequency, genital sores, hematuria, nocturia, penile pain, erectile dysfunction, penile swelling, scrotal swelling, testicular pain, urgency and urinary incontinence.   Musculoskeletal: Negative for back pain and joint swelling.   Neurological: Negative for tremors, seizures, speech difficulty, weakness and numbness.   Psychiatric/Behavioral: Negative for agitation, decreased concentration, sleep disturbance, depressed mood and stress. The patient is not nervous/anxious.        Physical Exam  Physical Exam  Vitals signs reviewed.   Constitutional:       Appearance: He is well-developed.   HENT:      Head: Normocephalic and atraumatic.   Neck:      Musculoskeletal: Normal range of motion.   Pulmonary:      Effort: Pulmonary effort is normal. No respiratory distress.   Abdominal:      General: There is no distension.      Palpations: Abdomen is soft. There is no mass.      Tenderness: There is no abdominal tenderness.      Hernia: No hernia is present.   Musculoskeletal: Normal range of motion.   Lymphadenopathy:      Cervical: No cervical adenopathy.   Skin:     General: Skin is warm and dry.   Neurological:      Mental Status: He is alert and oriented to person, place, and time.   Psychiatric:         Behavior: Behavior normal.         Labs Recent and today in the office:  Results for orders placed or performed in visit on 10/21/20   Testosterone    Specimen: Blood   Result Value Ref Range    Testosterone, Total 719 264 - 916 ng/dL   Estradiol    Specimen: Blood   Result Value Ref Range    Estradiol 24.7 7.6 - 42.6 pg/mL   PSA DIAGNOSTIC    Specimen: Blood   Result Value Ref Range    PSA <0.014 0.000 - 4.000 ng/mL   CBC & Differential    Specimen: Blood   Result Value Ref Range    WBC 6.17 3.40 - 10.80 10*3/mm3    RBC 5.71 4.14 - 5.80 10*6/mm3    Hemoglobin 15.8 13.0 - 17.7 g/dL     Hematocrit 48.9 37.5 - 51.0 %    MCV 85.6 79.0 - 97.0 fL    MCH 27.7 26.6 - 33.0 pg    MCHC 32.3 31.5 - 35.7 g/dL    RDW 14.1 12.3 - 15.4 %    Platelets 261 140 - 450 10*3/mm3    Neutrophil Rel % 62.1 42.7 - 76.0 %    Lymphocyte Rel % 25.0 19.6 - 45.3 %    Monocyte Rel % 8.3 5.0 - 12.0 %    Eosinophil Rel % 3.6 0.3 - 6.2 %    Basophil Rel % 0.5 0.0 - 1.5 %    Neutrophils Absolute 3.84 1.70 - 7.00 10*3/mm3    Lymphocytes Absolute 1.54 0.70 - 3.10 10*3/mm3    Monocytes Absolute 0.51 0.10 - 0.90 10*3/mm3    Eosinophils Absolute 0.22 0.00 - 0.40 10*3/mm3    Basophils Absolute 0.03 0.00 - 0.20 10*3/mm3    Immature Granulocyte Rel % 0.5 0.0 - 0.5 %    Immature Grans Absolute 0.03 0.00 - 0.05 10*3/mm3    nRBC 0.0 0.0 - 0.2 /100 WBC         Assessment & Plan  Hypogonadism/estradiol conversion: Patient will continue the self injecting at home every 21 days.  He will return in 3 months with follow-up blood work.    Erectile dysfunction: All of our treatment efforts have been unsatisfactory but at this point the patient is too concerned.

## 2020-11-03 ENCOUNTER — OFFICE VISIT (OUTPATIENT)
Dept: CARDIOLOGY | Facility: CLINIC | Age: 70
End: 2020-11-03

## 2020-11-03 VITALS
WEIGHT: 210 LBS | SYSTOLIC BLOOD PRESSURE: 128 MMHG | OXYGEN SATURATION: 98 % | BODY MASS INDEX: 27.83 KG/M2 | HEART RATE: 61 BPM | DIASTOLIC BLOOD PRESSURE: 52 MMHG | HEIGHT: 73 IN

## 2020-11-03 DIAGNOSIS — E78.2 MIXED DYSLIPIDEMIA: ICD-10-CM

## 2020-11-03 DIAGNOSIS — I25.10 CORONARY ARTERY DISEASE INVOLVING NATIVE CORONARY ARTERY OF NATIVE HEART WITHOUT ANGINA PECTORIS: Primary | ICD-10-CM

## 2020-11-03 DIAGNOSIS — I10 ESSENTIAL HYPERTENSION: ICD-10-CM

## 2020-11-03 PROBLEM — R94.39 ABNORMAL STRESS TEST: Status: RESOLVED | Noted: 2018-08-14 | Resolved: 2020-11-03

## 2020-11-03 PROCEDURE — 99214 OFFICE O/P EST MOD 30 MIN: CPT | Performed by: INTERNAL MEDICINE

## 2021-01-14 ENCOUNTER — LAB (OUTPATIENT)
Dept: UROLOGY | Facility: CLINIC | Age: 71
End: 2021-01-14

## 2021-01-14 DIAGNOSIS — C61 PROSTATE CANCER (HCC): Primary | ICD-10-CM

## 2021-01-14 DIAGNOSIS — E29.1 HYPOGONADISM IN MALE: ICD-10-CM

## 2021-01-14 DIAGNOSIS — R79.89 LOW TESTOSTERONE: ICD-10-CM

## 2021-01-15 LAB
BASOPHILS # BLD AUTO: 0.02 10*3/MM3 (ref 0–0.2)
BASOPHILS NFR BLD AUTO: 0.3 % (ref 0–1.5)
EOSINOPHIL # BLD AUTO: 0.11 10*3/MM3 (ref 0–0.4)
EOSINOPHIL NFR BLD AUTO: 1.9 % (ref 0.3–6.2)
ERYTHROCYTE [DISTWIDTH] IN BLOOD BY AUTOMATED COUNT: 15 % (ref 12.3–15.4)
ESTRADIOL SERPL-MCNC: 22 PG/ML (ref 7.6–42.6)
HCT VFR BLD AUTO: 48.2 % (ref 37.5–51)
HGB BLD-MCNC: 16 G/DL (ref 13–17.7)
IMM GRANULOCYTES # BLD AUTO: 0.01 10*3/MM3 (ref 0–0.05)
IMM GRANULOCYTES NFR BLD AUTO: 0.2 % (ref 0–0.5)
LYMPHOCYTES # BLD AUTO: 1.48 10*3/MM3 (ref 0.7–3.1)
LYMPHOCYTES NFR BLD AUTO: 25.7 % (ref 19.6–45.3)
MCH RBC QN AUTO: 27.9 PG (ref 26.6–33)
MCHC RBC AUTO-ENTMCNC: 33.2 G/DL (ref 31.5–35.7)
MCV RBC AUTO: 84.1 FL (ref 79–97)
MONOCYTES # BLD AUTO: 0.51 10*3/MM3 (ref 0.1–0.9)
MONOCYTES NFR BLD AUTO: 8.9 % (ref 5–12)
NEUTROPHILS # BLD AUTO: 3.63 10*3/MM3 (ref 1.7–7)
NEUTROPHILS NFR BLD AUTO: 63 % (ref 42.7–76)
NRBC BLD AUTO-RTO: 0 /100 WBC (ref 0–0.2)
PLATELET # BLD AUTO: 197 10*3/MM3 (ref 140–450)
PSA SERPL-MCNC: <0.014 NG/ML (ref 0–4)
RBC # BLD AUTO: 5.73 10*6/MM3 (ref 4.14–5.8)
TESTOST SERPL-MCNC: 154 NG/DL (ref 264–916)
WBC # BLD AUTO: 5.76 10*3/MM3 (ref 3.4–10.8)

## 2021-01-18 ENCOUNTER — OFFICE VISIT (OUTPATIENT)
Dept: UROLOGY | Facility: CLINIC | Age: 71
End: 2021-01-18

## 2021-01-18 VITALS
RESPIRATION RATE: 18 BRPM | HEART RATE: 78 BPM | TEMPERATURE: 98 F | DIASTOLIC BLOOD PRESSURE: 62 MMHG | HEIGHT: 73 IN | SYSTOLIC BLOOD PRESSURE: 120 MMHG | WEIGHT: 205 LBS | OXYGEN SATURATION: 100 % | BODY MASS INDEX: 27.17 KG/M2

## 2021-01-18 DIAGNOSIS — B37.9 CANDIDIASIS: ICD-10-CM

## 2021-01-18 DIAGNOSIS — N52.9 ERECTILE DYSFUNCTION, UNSPECIFIED ERECTILE DYSFUNCTION TYPE: Primary | ICD-10-CM

## 2021-01-18 DIAGNOSIS — R79.89 LOW TESTOSTERONE: ICD-10-CM

## 2021-01-18 PROCEDURE — 99214 OFFICE O/P EST MOD 30 MIN: CPT | Performed by: UROLOGY

## 2021-01-18 RX ORDER — RABEPRAZOLE SODIUM 20 MG/1
20 TABLET, DELAYED RELEASE ORAL DAILY
Status: ON HOLD | COMMUNITY
Start: 2020-12-16 | End: 2021-12-29

## 2021-01-18 RX ORDER — CLOTRIMAZOLE AND BETAMETHASONE DIPROPIONATE 10; .64 MG/G; MG/G
CREAM TOPICAL
Qty: 15 G | Refills: 1 | Status: SHIPPED | OUTPATIENT
Start: 2021-01-18 | End: 2022-05-04

## 2021-01-18 RX ORDER — NYSTATIN 100000 [USP'U]/G
POWDER TOPICAL 3 TIMES DAILY
Qty: 45 G | Refills: 1 | Status: SHIPPED | OUTPATIENT
Start: 2021-01-18 | End: 2021-12-27

## 2021-01-18 RX ORDER — DICLOFENAC SODIUM 75 MG/1
75 TABLET, DELAYED RELEASE ORAL
COMMUNITY
Start: 2020-12-16 | End: 2021-12-27

## 2021-01-18 RX ORDER — METRONIDAZOLE 7.5 MG/G
1 GEL TOPICAL DAILY
COMMUNITY
Start: 2020-12-14

## 2021-01-18 RX ORDER — DICYCLOMINE HYDROCHLORIDE 10 MG/1
10 CAPSULE ORAL DAILY
COMMUNITY
Start: 2020-12-31 | End: 2022-05-04

## 2021-01-18 RX ORDER — SULFAMETHOXAZOLE AND TRIMETHOPRIM 800; 160 MG/1; MG/1
TABLET ORAL
COMMUNITY
Start: 2020-12-14 | End: 2021-12-27

## 2021-01-18 RX ORDER — FLUOXETINE HYDROCHLORIDE 20 MG/1
CAPSULE ORAL
Status: ON HOLD | COMMUNITY
Start: 2021-01-07 | End: 2021-12-29

## 2021-01-18 NOTE — PROGRESS NOTES
"Chief Complaint  ED    HPI  Mr. Jerry is a 70 y.o. male with history of erectile dysfunction after robotic prostatectomy, status post insertion of 3-piece Coloplast penile prosthesis with ectopic reservoir on 11/28/18.     He saw Dr. Villavicencio at , who recommended device replacement with 3 piece AMS prosthesis.   He underwent IPP replacement there.  He says he is unhappy with this due to the length.    He now presents for testosterone management.  He denies any leg swelling or shortness of breath.  He does complain of a rash around his penis and scrotum that is red.  He denies fevers.  There is some itching.  He has had swelling of the foreskin.    I have reviewed the patient's medical history in detail and updated the computerized patient record.    Review of Systems  Constitutional: No fevers or chills  Skin: Negative for rash  Endocrine: No heat/cold intolerance   Gastrointestinal: Negative for constipation, nausea or vomiting  Genitourinary: Negative for new lower urinary tract symptoms, gross hematuria or dysuria.  Musculoskeletal: Negative for flank pain  Neurological:  Negative for frequent headaches or dizziness  Lymph/Heme: Negative for leg swelling or calf pain    Physical Exam  Visit Vitals  /62   Pulse 78   Temp 98 °F (36.7 °C)   Resp 18   Ht 185.4 cm (73\")   Wt 93 kg (205 lb)   SpO2 100%   BMI 27.05 kg/m²     Constitutional: NAD, WDWN.  Cardiovascular: Regular rate.  Pulmonary/Chest: Respirations are even and non-labored bilaterally.  Abdominal: Soft. No distension, tenderness, masses or guarding. No CVA tenderness.  Abdominal incision is healed  Extremities: SUDHA x 4, Warm. No clubbing.  No cyanosis.    Skin: Pink, warm and dry.  No rashes noted.    Penile prosthesis and bulb are in good place.  There is a red rash over the entire scrotum and part of the penile shaft.  The foreskin is edematous and red.    Labs  Lab Results   Component Value Date    WBC 5.76 01/14/2021    HGB 16.0 01/14/2021    " HCT 48.2 01/14/2021    MCV 84.1 01/14/2021     01/14/2021     Radiographic Studies  No radiology results for the last 30 days.    Assessment  70 y.o. male with history of erectile dysfunction after robotic prostatectomy, status post insertion of 3-piece Coloplast penile prosthesis with ectopic reservoir on 11/28/18.  He also has low testosterone, which was previously managed by Dr. Park.  He presents with what is likely a fungal infection today.    Plan  1.   Switch back to pellets  2.  Lotrisone and nystatin powder    Nick Sommer MD

## 2021-02-17 DIAGNOSIS — Z23 IMMUNIZATION DUE: ICD-10-CM

## 2021-03-15 ENCOUNTER — PROCEDURE VISIT (OUTPATIENT)
Dept: UROLOGY | Facility: CLINIC | Age: 71
End: 2021-03-15

## 2021-03-15 DIAGNOSIS — R79.89 LOW TESTOSTERONE: Primary | ICD-10-CM

## 2021-03-15 PROCEDURE — 11980 IMPLANT HORMONE PELLET(S): CPT | Performed by: UROLOGY

## 2021-03-15 NOTE — PROGRESS NOTES
OFFICE PROCEDURE NOTE      PREPROCEDURE DIAGNOSIS:  Hypogonadism.      POSTPROCEDURE DIAGNOSIS:  Hypogonadism.      PROCEDURE:  Testopel (NDC# 38341-282-24) insertion/implant in Right flank, 11 pellets  implanted.      SURGEON:    Nick Sommer MD    ANESTHESIA:  Local.      DRAINS:  None.      Labs reviewed,       The risks/benefits of the procedure were discussed with the patient.     DESCRIPTION OF PROCEDURE: The patient was placed in the lateral decubitus position and his skin was prepped with chlorhexadine solution. 10 ml of 1% lidocaine w/epinephrine were instilled subcutaneously in a straight fashion. A 3mm skin puncture was made with an 11 blade. The trochar was placed subcutaneously along the line with ease and without patient discomfort. The sharp stylet was removed and the pellets were placed along the track and positioned using the blunt stylet. Following this,  a steri strip was used to close the puncture wound. A 4/4 gauze pad was placed over the wound and a Tegaderm bandage was applied and the patient was repositioned on his opposite side for compression purposes with a roll.      PLAN:  The patient was discharged in stable condition to be followed up with serial serum testosterone levels. Follow up for next visit in 4 months.  Repeat testosterone levels in 2 months

## 2021-06-07 ENCOUNTER — LAB (OUTPATIENT)
Dept: UROLOGY | Facility: CLINIC | Age: 71
End: 2021-06-07

## 2021-06-08 LAB
HCT VFR BLD AUTO: 46.1 % (ref 37.5–51)
HGB BLD-MCNC: 14.9 G/DL (ref 13–17.7)
TESTOST SERPL-MCNC: 344 NG/DL (ref 264–916)

## 2021-07-15 ENCOUNTER — PROCEDURE VISIT (OUTPATIENT)
Dept: UROLOGY | Facility: CLINIC | Age: 71
End: 2021-07-15

## 2021-07-15 DIAGNOSIS — R79.89 LOW TESTOSTERONE: Primary | ICD-10-CM

## 2021-07-15 PROCEDURE — 11980 IMPLANT HORMONE PELLET(S): CPT | Performed by: UROLOGY

## 2021-07-15 NOTE — PROGRESS NOTES
OFFICE PROCEDURE NOTE      PREPROCEDURE DIAGNOSIS:  Hypogonadism.      POSTPROCEDURE DIAGNOSIS:  Hypogonadism.      PROCEDURE:  Testopel (NDC# 15101-515-81) insertion/implant in Left flank, 11 pellets  implanted.      SURGEON:    Nick Sommer MD    ANESTHESIA:  Local.      DRAINS:  None.      Labs reviewed,    Results for ELEANOR MONTERO (MRN [id number]) as of 7/15/2021 09:19   Ref. Range 6/7/2021 13:54   Testosterone, Total Latest Ref Range: 264 - 916 ng/dL 344   Hemoglobin Latest Ref Range: 13.0 - 17.7 g/dL 14.9   Hematocrit Latest Ref Range: 37.5 - 51.0 % 46.1       The risks/benefits of the procedure were discussed with the patient.     DESCRIPTION OF PROCEDURE: The patient was placed in the lateral decubitus position and his skin was prepped with chlorhexadine solution. 10 ml of 1% lidocaine w/epinephrine were instilled subcutaneously in a straight fashion. A 3mm skin puncture was made with an 11 blade. The trochar was placed subcutaneously along the line with ease and without patient discomfort. The sharp stylet was removed and the pellets were placed along the track and positioned using the blunt stylet. Following this,  a steri strip was used to close the puncture wound. A 4/4 gauze pad was placed over the wound and a Tegaderm bandage was applied and the patient was repositioned on his opposite side for compression purposes with a roll.      PLAN:  The patient was discharged in stable condition to be followed up with serial serum testosterone levels. Follow up for next visit in 4 months.  Repeat testosterone levels in 2 months

## 2021-10-04 ENCOUNTER — LAB (OUTPATIENT)
Dept: UROLOGY | Facility: CLINIC | Age: 71
End: 2021-10-04

## 2021-10-21 ENCOUNTER — TELEPHONE (OUTPATIENT)
Dept: UROLOGY | Facility: CLINIC | Age: 71
End: 2021-10-21

## 2021-10-21 NOTE — TELEPHONE ENCOUNTER
Caller: ELEANOR MONTERO    Relationship: SELF    Best call back number: [phone]    What does billing need from the patient: PATIENT STATES HE RECEIVED A BILL FROM [facility] AND SAYS THE AMOUNT IS MORE THAN IT SHOULD BE. PATIENT SAID HE SPOKE TO MIHAI ABOUT THE INSURANCE BEING BILLED INCORRECTLY DUE TO INCORRECT CODING. HE IS NOW BEING TOLD THAT HIS BILL IS GOING TO BE TURNED OVER TO COLLECTIONS IF NOT PAID. HE WOULD LIKE A CALL BACK TO SEE WHAT IS AND/OR CAN BE DONE TO CORRECT THE CODING AND RESEND TO INSURANCE.      PATIENT CAN BE REACHED AT ANYTIME. OKAY TO LEAVE MESSAGE IF NO ANSWER.

## 2021-10-27 ENCOUNTER — OFFICE VISIT (OUTPATIENT)
Dept: PULMONOLOGY | Facility: CLINIC | Age: 71
End: 2021-10-27

## 2021-10-27 VITALS
OXYGEN SATURATION: 97 % | SYSTOLIC BLOOD PRESSURE: 134 MMHG | DIASTOLIC BLOOD PRESSURE: 74 MMHG | HEART RATE: 69 BPM | WEIGHT: 216 LBS | RESPIRATION RATE: 18 BRPM | HEIGHT: 73 IN | BODY MASS INDEX: 28.63 KG/M2

## 2021-10-27 DIAGNOSIS — G47.19 EXCESSIVE DAYTIME SLEEPINESS: ICD-10-CM

## 2021-10-27 DIAGNOSIS — J45.30 MILD PERSISTENT ASTHMA WITHOUT COMPLICATION: ICD-10-CM

## 2021-10-27 DIAGNOSIS — R06.02 SHORTNESS OF BREATH: ICD-10-CM

## 2021-10-27 DIAGNOSIS — G47.33 OBSTRUCTIVE SLEEP APNEA: Primary | ICD-10-CM

## 2021-10-27 DIAGNOSIS — R06.02 SOB (SHORTNESS OF BREATH): Primary | ICD-10-CM

## 2021-10-27 DIAGNOSIS — J30.89 OTHER ALLERGIC RHINITIS: ICD-10-CM

## 2021-10-27 DIAGNOSIS — R06.83 SNORING: ICD-10-CM

## 2021-10-27 PROCEDURE — 95012 NITRIC OXIDE EXP GAS DETER: CPT | Performed by: INTERNAL MEDICINE

## 2021-10-27 PROCEDURE — 99203 OFFICE O/P NEW LOW 30 MIN: CPT | Performed by: INTERNAL MEDICINE

## 2021-10-27 PROCEDURE — 94060 EVALUATION OF WHEEZING: CPT | Performed by: INTERNAL MEDICINE

## 2021-10-27 RX ORDER — ALBUTEROL SULFATE 90 UG/1
2 AEROSOL, METERED RESPIRATORY (INHALATION) EVERY 4 HOURS PRN
Qty: 18 G | Refills: 5 | Status: SHIPPED | OUTPATIENT
Start: 2021-10-27 | End: 2022-05-04

## 2021-10-27 RX ORDER — AZELASTINE 1 MG/ML
1 SPRAY, METERED NASAL 2 TIMES DAILY PRN
Qty: 1 EACH | Refills: 5 | Status: SHIPPED | OUTPATIENT
Start: 2021-10-27 | End: 2022-05-04 | Stop reason: SDUPTHER

## 2021-10-27 RX ORDER — BREXPIPRAZOLE 1 MG/1
1 TABLET ORAL DAILY
COMMUNITY
Start: 2021-10-07 | End: 2022-05-04

## 2021-10-27 RX ORDER — DESVENLAFAXINE 100 MG/1
100 TABLET, EXTENDED RELEASE ORAL DAILY
COMMUNITY
Start: 2021-09-13 | End: 2022-05-04

## 2021-10-29 NOTE — TELEPHONE ENCOUNTER
MR. SUÁREZ ([phone]) CALLED TO SPEAK WITH VERA. (HUB UNABLE TO WARM TRANSFER)  STATES HE IS RECEIVING CALLS FROM  BILLING REQUESTING PAYMENT. HE HAS BEEN WAITING FOR A CALL BACK   [facility]

## 2021-11-15 ENCOUNTER — PROCEDURE VISIT (OUTPATIENT)
Dept: UROLOGY | Facility: CLINIC | Age: 71
End: 2021-11-15

## 2021-11-15 ENCOUNTER — HOSPITAL ENCOUNTER (OUTPATIENT)
Dept: INFUSION THERAPY | Facility: HOSPITAL | Age: 71
Discharge: HOME | End: 2021-11-15
Admitting: UROLOGY

## 2021-11-15 VITALS
HEART RATE: 74 BPM | OXYGEN SATURATION: 97 % | DIASTOLIC BLOOD PRESSURE: 66 MMHG | SYSTOLIC BLOOD PRESSURE: 119 MMHG | TEMPERATURE: 98.6 F | RESPIRATION RATE: 18 BRPM

## 2021-11-15 DIAGNOSIS — D75.1 POLYCYTHEMIA: ICD-10-CM

## 2021-11-15 DIAGNOSIS — D75.1 POLYCYTHEMIA: Primary | ICD-10-CM

## 2021-11-15 DIAGNOSIS — E29.1 HYPOGONADISM IN MALE: Primary | ICD-10-CM

## 2021-11-15 PROCEDURE — 11980 IMPLANT HORMONE PELLET(S): CPT | Performed by: UROLOGY

## 2021-11-15 PROCEDURE — 99195 PHLEBOTOMY: CPT

## 2021-11-15 RX ORDER — SODIUM CHLORIDE 9 MG/ML
250 INJECTION, SOLUTION INTRAVENOUS ONCE
Status: CANCELLED | OUTPATIENT
Start: 2021-11-22

## 2021-11-15 RX ORDER — SODIUM CHLORIDE 9 MG/ML
250 INJECTION, SOLUTION INTRAVENOUS ONCE
Status: DISCONTINUED | OUTPATIENT
Start: 2021-11-15 | End: 2021-11-17 | Stop reason: HOSPADM

## 2021-11-15 NOTE — PROGRESS NOTES
OFFICE PROCEDURE NOTE      PREPROCEDURE DIAGNOSIS:  Hypogonadism.      POSTPROCEDURE DIAGNOSIS:  Hypogonadism.      PROCEDURE:  Testopel (NDC# 71175-787-13) insertion/implant in left flank, 11 pellets  implanted.      SURGEON:    Nick Sommer MD    ANESTHESIA:  Local.      DRAINS:  None.      Labs reviewed,    Results for ELEANOR MONTERO (MRN [id number]) as of 11/15/2021 08:41   Ref. Range 10/4/2021 14:44   Testosterone, Total Latest Ref Range: 264 - 916 ng/dL 350   Hemoglobin Latest Ref Range: 13.0 - 17.7 g/dL 17.4   Hematocrit Latest Ref Range: 37.5 - 51.0 % 54.9 (H)       The risks/benefits of the procedure were discussed with the patient.     DESCRIPTION OF PROCEDURE: The patient was placed in the lateral decubitus position and his skin was prepped with chlorhexadine solution. 10 ml of 1% lidocaine w/epinephrine were instilled subcutaneously in a straight fashion. A 3mm skin puncture was made with an 11 blade. The trochar was placed subcutaneously along the line with ease and without patient discomfort. The sharp stylet was removed and the pellets were placed along the track and positioned using the blunt stylet. Following this,  a steri strip was used to close the puncture wound. A 4/4 gauze pad was placed over the wound and a Tegaderm bandage was applied and the patient was repositioned on his opposite side for compression purposes with a roll.      PLAN:  The patient was discharged in stable condition to be followed up with serial serum testosterone levels. Follow up for next visit in 4 months.  Repeat testosterone levels in 2 months

## 2021-11-16 RX ORDER — SODIUM CHLORIDE 9 MG/ML
250 INJECTION, SOLUTION INTRAVENOUS ONCE
Status: CANCELLED | OUTPATIENT
Start: 2021-11-16

## 2021-11-30 ENCOUNTER — OFFICE VISIT (OUTPATIENT)
Dept: CARDIOLOGY | Facility: CLINIC | Age: 71
End: 2021-11-30

## 2021-11-30 VITALS
HEIGHT: 72 IN | DIASTOLIC BLOOD PRESSURE: 70 MMHG | SYSTOLIC BLOOD PRESSURE: 140 MMHG | WEIGHT: 225.2 LBS | HEART RATE: 76 BPM | BODY MASS INDEX: 30.5 KG/M2

## 2021-11-30 DIAGNOSIS — I25.10 CORONARY ARTERY DISEASE INVOLVING NATIVE CORONARY ARTERY OF NATIVE HEART WITHOUT ANGINA PECTORIS: Primary | ICD-10-CM

## 2021-11-30 DIAGNOSIS — R06.09 DYSPNEA ON EXERTION: ICD-10-CM

## 2021-11-30 DIAGNOSIS — I10 ESSENTIAL HYPERTENSION: ICD-10-CM

## 2021-11-30 DIAGNOSIS — E78.2 MIXED DYSLIPIDEMIA: ICD-10-CM

## 2021-11-30 PROCEDURE — 99214 OFFICE O/P EST MOD 30 MIN: CPT | Performed by: INTERNAL MEDICINE

## 2021-11-30 RX ORDER — METOPROLOL SUCCINATE 50 MG/1
50 TABLET, EXTENDED RELEASE ORAL DAILY
Qty: 90 TABLET | Refills: 3 | Status: SHIPPED | OUTPATIENT
Start: 2021-11-30 | End: 2022-07-05

## 2021-12-15 ENCOUNTER — OUTSIDE FACILITY SERVICE (OUTPATIENT)
Dept: CARDIOLOGY | Facility: CLINIC | Age: 71
End: 2021-12-15

## 2021-12-15 PROCEDURE — 93016 CV STRESS TEST SUPVJ ONLY: CPT | Performed by: INTERNAL MEDICINE

## 2021-12-15 PROCEDURE — 93018 CV STRESS TEST I&R ONLY: CPT | Performed by: INTERNAL MEDICINE

## 2021-12-15 PROCEDURE — 78452 HT MUSCLE IMAGE SPECT MULT: CPT | Performed by: INTERNAL MEDICINE

## 2021-12-27 ENCOUNTER — PRE-ADMISSION TESTING (OUTPATIENT)
Dept: PREADMISSION TESTING | Facility: HOSPITAL | Age: 71
End: 2021-12-27

## 2021-12-27 ENCOUNTER — TELEPHONE (OUTPATIENT)
Dept: CARDIOLOGY | Facility: CLINIC | Age: 71
End: 2021-12-27

## 2021-12-27 VITALS — HEIGHT: 73 IN | BODY MASS INDEX: 29.77 KG/M2 | WEIGHT: 224.65 LBS

## 2021-12-27 LAB
ALBUMIN SERPL-MCNC: 4.2 G/DL (ref 3.5–5.2)
ALBUMIN/GLOB SERPL: 2 G/DL
ALP SERPL-CCNC: 81 U/L (ref 39–117)
ALT SERPL W P-5'-P-CCNC: 24 U/L (ref 1–41)
ANION GAP SERPL CALCULATED.3IONS-SCNC: 9 MMOL/L (ref 5–15)
AST SERPL-CCNC: 22 U/L (ref 1–40)
BILIRUB SERPL-MCNC: 0.4 MG/DL (ref 0–1.2)
BUN SERPL-MCNC: 11 MG/DL (ref 8–23)
BUN/CREAT SERPL: 12.1 (ref 7–25)
CALCIUM SPEC-SCNC: 9 MG/DL (ref 8.6–10.5)
CHLORIDE SERPL-SCNC: 103 MMOL/L (ref 98–107)
CO2 SERPL-SCNC: 26 MMOL/L (ref 22–29)
CREAT SERPL-MCNC: 0.91 MG/DL (ref 0.76–1.27)
DEPRECATED RDW RBC AUTO: 47.1 FL (ref 37–54)
ERYTHROCYTE [DISTWIDTH] IN BLOOD BY AUTOMATED COUNT: 16.7 % (ref 12.3–15.4)
GFR SERPL CREATININE-BSD FRML MDRD: 82 ML/MIN/1.73
GLOBULIN UR ELPH-MCNC: 2.1 GM/DL
GLUCOSE SERPL-MCNC: 127 MG/DL (ref 65–99)
HCT VFR BLD AUTO: 52.2 % (ref 37.5–51)
HGB BLD-MCNC: 17.2 G/DL (ref 13–17.7)
INR PPP: 0.97 (ref 0.85–1.16)
MCH RBC QN AUTO: 27.2 PG (ref 26.6–33)
MCHC RBC AUTO-ENTMCNC: 33 G/DL (ref 31.5–35.7)
MCV RBC AUTO: 82.6 FL (ref 79–97)
PLATELET # BLD AUTO: 178 10*3/MM3 (ref 140–450)
PMV BLD AUTO: 9.6 FL (ref 6–12)
POTASSIUM SERPL-SCNC: 3.9 MMOL/L (ref 3.5–5.2)
PROT SERPL-MCNC: 6.3 G/DL (ref 6–8.5)
PROTHROMBIN TIME: 12.6 SECONDS (ref 11.4–14.4)
QT INTERVAL: 384 MS
QTC INTERVAL: 396 MS
RBC # BLD AUTO: 6.32 10*6/MM3 (ref 4.14–5.8)
SARS-COV-2 RNA PNL SPEC NAA+PROBE: NOT DETECTED
SODIUM SERPL-SCNC: 138 MMOL/L (ref 136–145)
WBC NRBC COR # BLD: 5.15 10*3/MM3 (ref 3.4–10.8)

## 2021-12-27 PROCEDURE — 85610 PROTHROMBIN TIME: CPT

## 2021-12-27 PROCEDURE — 93010 ELECTROCARDIOGRAM REPORT: CPT | Performed by: INTERNAL MEDICINE

## 2021-12-27 PROCEDURE — 93005 ELECTROCARDIOGRAM TRACING: CPT

## 2021-12-27 PROCEDURE — 36415 COLL VENOUS BLD VENIPUNCTURE: CPT

## 2021-12-27 PROCEDURE — C9803 HOPD COVID-19 SPEC COLLECT: HCPCS

## 2021-12-27 PROCEDURE — 85027 COMPLETE CBC AUTOMATED: CPT

## 2021-12-27 PROCEDURE — U0004 COV-19 TEST NON-CDC HGH THRU: HCPCS

## 2021-12-27 PROCEDURE — 80053 COMPREHEN METABOLIC PANEL: CPT

## 2021-12-27 NOTE — PAT
"An arrival time for procedure was not given during PAT visit. If patient had any questions or concerns about their arrival time, they were instructed to contact their surgeon/physician.  Additionally, if the patient referred to an arrival time that was acquired from their my chart account, patient was encouraged to verify that time with their surgeon/physician.  NO arrival times given in Pre Admission Testing Department.    Patient viewed general PAT education video as instructed in their preoperative information received from their surgeon.  Patient stated the general PAT education video was viewed in its entirety and survey completed.  Copies of PAT general education handouts (Incentive Spirometry, Meds to Beds Program, Patient Belongings, Pre-op skin preparation instructions, Blood Glucose testing, Visitor policy, Surgery FAQ, Code H) distributed to patient if not printed. Education related to the PAT pass and skin preparation for surgery (if applicable) completed in PAT as a reinforcement to PAT education video. Patient instructed to return PAT pass provided today as well as completed skin preparation sheet (if applicable) on the day of procedure.     Additionally if patient had not viewed video yet but intended to view it at home or in our waiting area, then referred them to the handout with QR code/link provided during PAT visit.  Instructed patient to complete survey after viewing the video in its entirety.  Encouraged patient/family to read PAT general education handouts thoroughly and notify PAT staff with any questions or concerns. Patient verbalized understanding of all information and priority content.    PT HAS AN ALLERGY TO CHLORHEXIDINE. PURPLE WIPES GIVEN WITH INSTRUCTION SHEET.    CARDIAC CLEARANCE REQUESTED FROM DR. JORDAN (SPOKE WITH VINCENT). DR. JORADN'S NOTE FROM 12/3/21 STATES \"STRESS TEST AND ECHO ORDERED AND F/U IN 3 MONTHS.\"  PT STATED HE HAD DONE AT [facility]. CALLED TO HAVE FAXED. " ECHO WAS NOT COMPLETED.

## 2021-12-27 NOTE — TELEPHONE ENCOUNTER
Pre-admission testing calling requesting CC for laparoscopic cholecystectomy. Pt stress test 12/15/21 @ [facility]. Results are shown below. Pt unable to complete echo due to insurance issues. Ok to go ahead and clear pt for surgery? He is currently only on ASA 81 QD.

## 2021-12-28 ENCOUNTER — ANESTHESIA EVENT (OUTPATIENT)
Dept: PERIOP | Facility: HOSPITAL | Age: 71
End: 2021-12-28

## 2021-12-28 RX ORDER — FAMOTIDINE 10 MG/ML
20 INJECTION, SOLUTION INTRAVENOUS ONCE
Status: CANCELLED | OUTPATIENT
Start: 2021-12-28 | End: 2021-12-28

## 2021-12-29 ENCOUNTER — ANESTHESIA (OUTPATIENT)
Dept: PERIOP | Facility: HOSPITAL | Age: 71
End: 2021-12-29

## 2021-12-29 ENCOUNTER — HOSPITAL ENCOUNTER (OUTPATIENT)
Facility: HOSPITAL | Age: 71
Setting detail: HOSPITAL OUTPATIENT SURGERY
Discharge: HOME | End: 2021-12-29
Attending: SURGERY | Admitting: SURGERY

## 2021-12-29 VITALS
SYSTOLIC BLOOD PRESSURE: 113 MMHG | WEIGHT: 224 LBS | OXYGEN SATURATION: 92 % | DIASTOLIC BLOOD PRESSURE: 60 MMHG | BODY MASS INDEX: 29.69 KG/M2 | HEART RATE: 63 BPM | RESPIRATION RATE: 16 BRPM | HEIGHT: 73 IN | TEMPERATURE: 97.1 F

## 2021-12-29 DIAGNOSIS — D75.1 POLYCYTHEMIA: Primary | ICD-10-CM

## 2021-12-29 DIAGNOSIS — K80.20 BILIARY CALCULUS: ICD-10-CM

## 2021-12-29 PROCEDURE — 25010000002 FENTANYL CITRATE (PF) 50 MCG/ML SOLUTION: Performed by: NURSE ANESTHETIST, CERTIFIED REGISTERED

## 2021-12-29 PROCEDURE — 25010000002 ONDANSETRON PER 1 MG: Performed by: NURSE ANESTHETIST, CERTIFIED REGISTERED

## 2021-12-29 PROCEDURE — 25010000002 DEXAMETHASONE PER 1 MG: Performed by: NURSE ANESTHETIST, CERTIFIED REGISTERED

## 2021-12-29 PROCEDURE — 88304 TISSUE EXAM BY PATHOLOGIST: CPT | Performed by: SURGERY

## 2021-12-29 PROCEDURE — 25010000002 NEOSTIGMINE 10 MG/10ML SOLUTION: Performed by: NURSE ANESTHETIST, CERTIFIED REGISTERED

## 2021-12-29 PROCEDURE — 25010000002 PROPOFOL 10 MG/ML EMULSION: Performed by: NURSE ANESTHETIST, CERTIFIED REGISTERED

## 2021-12-29 PROCEDURE — 25010000002 FENTANYL CITRATE (PF) 50 MCG/ML SOLUTION

## 2021-12-29 PROCEDURE — 0 CEFAZOLIN IN DEXTROSE 2-4 GM/100ML-% SOLUTION: Performed by: SURGERY

## 2021-12-29 RX ORDER — BUPIVACAINE HCL/0.9 % NACL/PF 0.125 %
PLASTIC BAG, INJECTION (ML) EPIDURAL AS NEEDED
Status: DISCONTINUED | OUTPATIENT
Start: 2021-12-29 | End: 2021-12-29 | Stop reason: SURG

## 2021-12-29 RX ORDER — ONDANSETRON 2 MG/ML
4 INJECTION INTRAMUSCULAR; INTRAVENOUS ONCE AS NEEDED
Status: DISCONTINUED | OUTPATIENT
Start: 2021-12-29 | End: 2021-12-29 | Stop reason: HOSPADM

## 2021-12-29 RX ORDER — FENTANYL CITRATE 50 UG/ML
50 INJECTION, SOLUTION INTRAMUSCULAR; INTRAVENOUS
Status: DISCONTINUED | OUTPATIENT
Start: 2021-12-29 | End: 2021-12-29 | Stop reason: HOSPADM

## 2021-12-29 RX ORDER — OXYCODONE HYDROCHLORIDE AND ACETAMINOPHEN 5; 325 MG/1; MG/1
1 TABLET ORAL ONCE AS NEEDED
Status: COMPLETED | OUTPATIENT
Start: 2021-12-29 | End: 2021-12-29

## 2021-12-29 RX ORDER — CEFAZOLIN SODIUM 2 G/100ML
2 INJECTION, SOLUTION INTRAVENOUS ONCE
Status: COMPLETED | OUTPATIENT
Start: 2021-12-29 | End: 2021-12-29

## 2021-12-29 RX ORDER — DOCUSATE SODIUM 100 MG/1
100 CAPSULE, LIQUID FILLED ORAL 2 TIMES DAILY
Qty: 30 CAPSULE | Refills: 1 | Status: SHIPPED | OUTPATIENT
Start: 2021-12-29 | End: 2022-05-04

## 2021-12-29 RX ORDER — NEOSTIGMINE METHYLSULFATE 1 MG/ML
INJECTION, SOLUTION INTRAVENOUS AS NEEDED
Status: DISCONTINUED | OUTPATIENT
Start: 2021-12-29 | End: 2021-12-29 | Stop reason: SURG

## 2021-12-29 RX ORDER — FENTANYL CITRATE 50 UG/ML
INJECTION, SOLUTION INTRAMUSCULAR; INTRAVENOUS
Status: COMPLETED
Start: 2021-12-29 | End: 2021-12-29

## 2021-12-29 RX ORDER — MIDAZOLAM HYDROCHLORIDE 1 MG/ML
0.5 INJECTION INTRAMUSCULAR; INTRAVENOUS
Status: DISCONTINUED | OUTPATIENT
Start: 2021-12-29 | End: 2021-12-29 | Stop reason: HOSPADM

## 2021-12-29 RX ORDER — SODIUM CHLORIDE 9 MG/ML
250 INJECTION, SOLUTION INTRAVENOUS ONCE
Status: DISCONTINUED | OUTPATIENT
Start: 2021-12-29 | End: 2021-12-29 | Stop reason: HOSPADM

## 2021-12-29 RX ORDER — EPHEDRINE SULFATE 50 MG/ML
INJECTION, SOLUTION INTRAVENOUS AS NEEDED
Status: DISCONTINUED | OUTPATIENT
Start: 2021-12-29 | End: 2021-12-29 | Stop reason: SURG

## 2021-12-29 RX ORDER — FAMOTIDINE 20 MG/1
20 TABLET, FILM COATED ORAL ONCE
Status: COMPLETED | OUTPATIENT
Start: 2021-12-29 | End: 2021-12-29

## 2021-12-29 RX ORDER — ROCURONIUM BROMIDE 10 MG/ML
INJECTION, SOLUTION INTRAVENOUS AS NEEDED
Status: DISCONTINUED | OUTPATIENT
Start: 2021-12-29 | End: 2021-12-29 | Stop reason: SURG

## 2021-12-29 RX ORDER — SODIUM CHLORIDE, SODIUM LACTATE, POTASSIUM CHLORIDE, CALCIUM CHLORIDE 600; 310; 30; 20 MG/100ML; MG/100ML; MG/100ML; MG/100ML
9 INJECTION, SOLUTION INTRAVENOUS CONTINUOUS
Status: DISCONTINUED | OUTPATIENT
Start: 2021-12-29 | End: 2021-12-29 | Stop reason: HOSPADM

## 2021-12-29 RX ORDER — SODIUM CHLORIDE 0.9 % (FLUSH) 0.9 %
10 SYRINGE (ML) INJECTION EVERY 12 HOURS SCHEDULED
Status: DISCONTINUED | OUTPATIENT
Start: 2021-12-29 | End: 2021-12-29 | Stop reason: HOSPADM

## 2021-12-29 RX ORDER — LIDOCAINE HYDROCHLORIDE 20 MG/ML
INJECTION, SOLUTION INFILTRATION; PERINEURAL AS NEEDED
Status: DISCONTINUED | OUTPATIENT
Start: 2021-12-29 | End: 2021-12-29 | Stop reason: SURG

## 2021-12-29 RX ORDER — SODIUM CHLORIDE 9 MG/ML
250 INJECTION, SOLUTION INTRAVENOUS ONCE
OUTPATIENT
Start: 2022-01-05

## 2021-12-29 RX ORDER — OXYCODONE HYDROCHLORIDE AND ACETAMINOPHEN 5; 325 MG/1; MG/1
1 TABLET ORAL EVERY 6 HOURS PRN
Qty: 12 TABLET | Refills: 0 | Status: SHIPPED | OUTPATIENT
Start: 2021-12-29 | End: 2022-03-15

## 2021-12-29 RX ORDER — SODIUM CHLORIDE 9 MG/ML
INJECTION, SOLUTION INTRAVENOUS AS NEEDED
Status: DISCONTINUED | OUTPATIENT
Start: 2021-12-29 | End: 2021-12-29 | Stop reason: HOSPADM

## 2021-12-29 RX ORDER — BUPIVACAINE HYDROCHLORIDE AND EPINEPHRINE 2.5; 5 MG/ML; UG/ML
INJECTION, SOLUTION EPIDURAL; INFILTRATION; INTRACAUDAL; PERINEURAL AS NEEDED
Status: DISCONTINUED | OUTPATIENT
Start: 2021-12-29 | End: 2021-12-29 | Stop reason: HOSPADM

## 2021-12-29 RX ORDER — GLYCOPYRROLATE 0.2 MG/ML
INJECTION INTRAMUSCULAR; INTRAVENOUS AS NEEDED
Status: DISCONTINUED | OUTPATIENT
Start: 2021-12-29 | End: 2021-12-29 | Stop reason: SURG

## 2021-12-29 RX ORDER — DEXAMETHASONE SODIUM PHOSPHATE 4 MG/ML
8 INJECTION, SOLUTION INTRA-ARTICULAR; INTRALESIONAL; INTRAMUSCULAR; INTRAVENOUS; SOFT TISSUE ONCE AS NEEDED
Status: DISCONTINUED | OUTPATIENT
Start: 2021-12-29 | End: 2021-12-29 | Stop reason: HOSPADM

## 2021-12-29 RX ORDER — SODIUM CHLORIDE 0.9 % (FLUSH) 0.9 %
10 SYRINGE (ML) INJECTION AS NEEDED
Status: DISCONTINUED | OUTPATIENT
Start: 2021-12-29 | End: 2021-12-29 | Stop reason: HOSPADM

## 2021-12-29 RX ORDER — PROPOFOL 10 MG/ML
VIAL (ML) INTRAVENOUS AS NEEDED
Status: DISCONTINUED | OUTPATIENT
Start: 2021-12-29 | End: 2021-12-29 | Stop reason: SURG

## 2021-12-29 RX ORDER — FENTANYL CITRATE 50 UG/ML
INJECTION, SOLUTION INTRAMUSCULAR; INTRAVENOUS AS NEEDED
Status: DISCONTINUED | OUTPATIENT
Start: 2021-12-29 | End: 2021-12-29 | Stop reason: SURG

## 2021-12-29 RX ORDER — HYDROMORPHONE HYDROCHLORIDE 1 MG/ML
0.5 INJECTION, SOLUTION INTRAMUSCULAR; INTRAVENOUS; SUBCUTANEOUS
Status: DISCONTINUED | OUTPATIENT
Start: 2021-12-29 | End: 2021-12-29 | Stop reason: HOSPADM

## 2021-12-29 RX ORDER — OXYCODONE HYDROCHLORIDE AND ACETAMINOPHEN 5; 325 MG/1; MG/1
TABLET ORAL
Status: COMPLETED
Start: 2021-12-29 | End: 2021-12-29

## 2021-12-29 RX ORDER — LIDOCAINE HYDROCHLORIDE 10 MG/ML
0.5 INJECTION, SOLUTION EPIDURAL; INFILTRATION; INTRACAUDAL; PERINEURAL ONCE AS NEEDED
Status: COMPLETED | OUTPATIENT
Start: 2021-12-29 | End: 2021-12-29

## 2021-12-29 RX ORDER — ONDANSETRON 2 MG/ML
INJECTION INTRAMUSCULAR; INTRAVENOUS AS NEEDED
Status: DISCONTINUED | OUTPATIENT
Start: 2021-12-29 | End: 2021-12-29 | Stop reason: SURG

## 2021-12-29 RX ORDER — DEXAMETHASONE SODIUM PHOSPHATE 10 MG/ML
INJECTION INTRAMUSCULAR; INTRAVENOUS AS NEEDED
Status: DISCONTINUED | OUTPATIENT
Start: 2021-12-29 | End: 2021-12-29 | Stop reason: SURG

## 2021-12-29 RX ADMIN — OXYCODONE HYDROCHLORIDE AND ACETAMINOPHEN 1 TABLET: 5; 325 TABLET ORAL at 09:24

## 2021-12-29 RX ADMIN — Medication 100 MCG: at 08:02

## 2021-12-29 RX ADMIN — NEOSTIGMINE METHYLSULFATE 2.5 MG: 0.5 INJECTION INTRAVENOUS at 08:27

## 2021-12-29 RX ADMIN — LIDOCAINE HYDROCHLORIDE 0.5 ML: 10 INJECTION, SOLUTION EPIDURAL; INFILTRATION; INTRACAUDAL; PERINEURAL at 06:10

## 2021-12-29 RX ADMIN — DEXAMETHASONE SODIUM PHOSPHATE 4 MG: 10 INJECTION INTRAMUSCULAR; INTRAVENOUS at 07:51

## 2021-12-29 RX ADMIN — Medication 100 MCG: at 08:07

## 2021-12-29 RX ADMIN — FENTANYL CITRATE 50 MCG: 50 INJECTION INTRAMUSCULAR; INTRAVENOUS at 09:51

## 2021-12-29 RX ADMIN — FAMOTIDINE 20 MG: 20 TABLET ORAL at 06:28

## 2021-12-29 RX ADMIN — EPHEDRINE SULFATE 5 MG: 50 INJECTION INTRAVENOUS at 07:49

## 2021-12-29 RX ADMIN — ROCURONIUM BROMIDE 40 MG: 10 INJECTION, SOLUTION INTRAVENOUS at 07:35

## 2021-12-29 RX ADMIN — GLYCOPYRROLATE 0.4 MG: 0.2 INJECTION INTRAMUSCULAR; INTRAVENOUS at 08:27

## 2021-12-29 RX ADMIN — FENTANYL CITRATE 50 MCG: 50 INJECTION INTRAMUSCULAR; INTRAVENOUS at 09:19

## 2021-12-29 RX ADMIN — FENTANYL CITRATE 50 MCG: 50 INJECTION, SOLUTION INTRAMUSCULAR; INTRAVENOUS at 07:45

## 2021-12-29 RX ADMIN — GLYCOPYRROLATE 0.2 MG: 0.2 INJECTION INTRAMUSCULAR; INTRAVENOUS at 07:48

## 2021-12-29 RX ADMIN — EPHEDRINE SULFATE 10 MG: 50 INJECTION INTRAVENOUS at 07:50

## 2021-12-29 RX ADMIN — LIDOCAINE HYDROCHLORIDE 50 MG: 20 INJECTION, SOLUTION INFILTRATION; PERINEURAL at 07:35

## 2021-12-29 RX ADMIN — SODIUM CHLORIDE, POTASSIUM CHLORIDE, SODIUM LACTATE AND CALCIUM CHLORIDE 9 ML/HR: 600; 310; 30; 20 INJECTION, SOLUTION INTRAVENOUS at 06:10

## 2021-12-29 RX ADMIN — FENTANYL CITRATE 50 MCG: 50 INJECTION, SOLUTION INTRAMUSCULAR; INTRAVENOUS at 08:28

## 2021-12-29 RX ADMIN — GLYCOPYRROLATE 0.2 MG: 0.2 INJECTION INTRAMUSCULAR; INTRAVENOUS at 07:53

## 2021-12-29 RX ADMIN — PROPOFOL 200 MG: 10 INJECTION, EMULSION INTRAVENOUS at 07:35

## 2021-12-29 RX ADMIN — Medication 100 MCG: at 08:04

## 2021-12-29 RX ADMIN — EPHEDRINE SULFATE 10 MG: 50 INJECTION INTRAVENOUS at 07:53

## 2021-12-29 RX ADMIN — CEFAZOLIN SODIUM 2 G: 2 INJECTION, SOLUTION INTRAVENOUS at 07:30

## 2021-12-29 RX ADMIN — ONDANSETRON 4 MG: 2 INJECTION INTRAMUSCULAR; INTRAVENOUS at 08:17

## 2021-12-31 LAB
CYTO UR: NORMAL
LAB AP CASE REPORT: NORMAL
LAB AP CLINICAL INFORMATION: NORMAL
PATH REPORT.FINAL DX SPEC: NORMAL
PATH REPORT.GROSS SPEC: NORMAL

## 2022-02-02 DIAGNOSIS — G47.33 OBSTRUCTIVE SLEEP APNEA: Primary | ICD-10-CM

## 2022-02-08 ENCOUNTER — OUTSIDE FACILITY SERVICE (OUTPATIENT)
Dept: CARDIOLOGY | Facility: CLINIC | Age: 72
End: 2022-02-08

## 2022-02-08 PROCEDURE — 93306 TTE W/DOPPLER COMPLETE: CPT | Performed by: INTERNAL MEDICINE

## 2022-02-11 ENCOUNTER — LAB (OUTPATIENT)
Dept: UROLOGY | Facility: CLINIC | Age: 72
End: 2022-02-11

## 2022-02-28 ENCOUNTER — LAB (OUTPATIENT)
Dept: LAB | Facility: HOSPITAL | Age: 72
End: 2022-02-28

## 2022-02-28 DIAGNOSIS — G47.33 OBSTRUCTIVE SLEEP APNEA: ICD-10-CM

## 2022-02-28 LAB — SARS-COV-2 RNA NOSE QL NAA+PROBE: NOT DETECTED

## 2022-02-28 PROCEDURE — U0004 COV-19 TEST NON-CDC HGH THRU: HCPCS

## 2022-02-28 PROCEDURE — C9803 HOPD COVID-19 SPEC COLLECT: HCPCS

## 2022-03-02 ENCOUNTER — HOSPITAL ENCOUNTER (OUTPATIENT)
Dept: SLEEP MEDICINE | Facility: HOSPITAL | Age: 72
Discharge: HOME | End: 2022-03-02
Admitting: INTERNAL MEDICINE

## 2022-03-02 DIAGNOSIS — G47.33 OBSTRUCTIVE SLEEP APNEA: ICD-10-CM

## 2022-03-02 PROCEDURE — 95811 POLYSOM 6/>YRS CPAP 4/> PARM: CPT

## 2022-03-02 PROCEDURE — 95811 POLYSOM 6/>YRS CPAP 4/> PARM: CPT | Performed by: INTERNAL MEDICINE

## 2022-03-11 NOTE — PROGRESS NOTES
[facility] Cardiology    Encounter Date: 03/15/2022    Patient ID: José Miguel Jerry is a 71 y.o. male.  : 1950     PCP: Hernandez Jackman MD       Chief Complaint: Coronary artery disease involving native coronary artery of      PROBLEM LIST:  1. Coronary artery disease  a. Echocardiogram, 2018: EF >60%. AV leaflets mildly thickened. Mild AR. No significant AS.   b. Cardiac cath 2018: Nonobstructive plaque disease involving multiple vessels without evidence of hemodynamically significant coronary artery disease.  c. Cardiolite stress test, 2021: EF 59%. No evidence of ischemia.   d. Echocardiogram, 2022: EF 59%. Mild AR. Trace MR.   2. Hypertension   3. Mixed dyslipidemia   4. GERD  5. Polycythemia  6. Dysesthesia  7. Neuropathic pain  8. Erectile dysfunction after radial prostatectomy  10. Prostate cancer  11. Low testosterone    History of Present Illness  Patient presents today for a 3 month follow-up with a history of coronary artery disease, dyspnea on exertion, and cardiac risk factors. Since last visit, patient has some mild shortness of breath. He has been walking 1 mile per day, which he has tolerated but continues to have dyspnea when walking up hill. He denies chest pain, palpitations, orthopnea, and edema. His BP at home has been averaging 120s/70s.     Allergies   Allergen Reactions   • Chlorhexidine Rash   • Clindamycin/Lincomycin Rash     itching   • Neosporin [Bacitracin-Polymyxin B] Itching and Rash         Current Outpatient Medications:   •  albuterol sulfate HFA (Ventolin HFA) 108 (90 Base) MCG/ACT inhaler, Inhale 2 puffs Every 4 (Four) Hours As Needed for Wheezing or Shortness of Air., Disp: 18 g, Rfl: 5  •  amLODIPine-benazepril (LOTREL) 10-40 MG per capsule, Take 1 capsule by mouth Daily., Disp: , Rfl:   •  aspirin 81 MG EC tablet, Take 81 mg by mouth Daily., Disp: , Rfl:   •  azelastine (ASTELIN) 0.1 % nasal spray, 1 spray into  "the nostril(s) as directed by provider 2 (Two) Times a Day As Needed for Rhinitis or Allergies. Use in each nostril as directed, Disp: 1 each, Rfl: 5  •  clotrimazole-betamethasone (Lotrisone) 1-0.05 % cream, Apply to affected area 2 times daily (Patient taking differently: Apply 1 application topically to the appropriate area as directed 2 (Two) Times a Day. Apply to affected area 2 times daily), Disp: 15 g, Rfl: 1  •  desvenlafaxine (PRISTIQ) 100 MG 24 hr tablet, Take 100 mg by mouth Daily., Disp: , Rfl:   •  dicyclomine (BENTYL) 10 MG capsule, Take 10 mg by mouth Daily., Disp: , Rfl:   •  docusate sodium (Colace) 100 MG capsule, Take 1 capsule by mouth 2 (Two) Times a Day., Disp: 30 capsule, Rfl: 1  •  hydrochlorothiazide (MICROZIDE) 12.5 MG capsule, Take 12.5 mg by mouth Every Morning., Disp: , Rfl:   •  metoprolol succinate XL (TOPROL-XL) 50 MG 24 hr tablet, Take 1 tablet by mouth Daily., Disp: 90 tablet, Rfl: 3  •  metroNIDAZOLE (METROGEL) 0.75 % gel, Apply 1 application topically to the appropriate area as directed Daily., Disp: , Rfl:   •  montelukast (SINGULAIR) 10 MG tablet, Take 10 mg by mouth Every Night., Disp: , Rfl:   •  Multiple Vitamins-Minerals (MULTIVITAMIN ADULTS PO), Take 1 tablet by mouth Daily., Disp: , Rfl:   •  Needle, Disp, (BD Disp Needles) 25G X 5/8\" misc, 0.1 mL Daily As Needed (ed)., Disp: 25 each, Rfl: 3  •  nortriptyline (PAMELOR) 10 MG capsule, Take 30 mg by mouth Every Night., Disp: , Rfl:   •  Rexulti 1 MG tablet, Take 1 mg by mouth Daily., Disp: , Rfl:   •  simvastatin (ZOCOR) 20 MG tablet, Take 20 mg by mouth Every Night., Disp: , Rfl:   •  Syringe 20G X 1\" 3 ML misc, 2 mL Every 21 (Twenty-One) Days., Disp: 25 each, Rfl: 3  •  Syringe 20G X 1\" 3 ML misc, 2 mL Every 21 (Twenty-One) Days., Disp: 50 each, Rfl: 3  •  Testosterone Cypionate 100 MG/ML solution, Inject  as directed Every 7 (Seven) Days., Disp: , Rfl:     The following portions of the patient's history were reviewed and " "updated as appropriate: allergies, current medications, past family history, past medical history, past social history, past surgical history and problem list.    ROS  Review of Systems   Constitution: Negative for chills, fever, fatigue, generalized weakness.   Cardiovascular: Positive for dyspnea on exertion; Negative for chest pain, leg swelling, palpitations, orthopnea, and syncope.   Respiratory: Negative for cough, shortness of breath, and wheezing.  HENT: Negative for ear pain, nosebleeds, and tinnitus.  Gastrointestinal: Negative for abdominal pain, constipation, diarrhea, nausea and vomiting.   Genitourinary: No urinary symptoms.  Musculoskeletal: Negative for muscle cramps.  Neurological: Negative for dizziness, headaches, loss of balance, numbness, and symptoms of stroke.  Psychiatric: Normal mental status.     All other systems reviewed and are negative.        Objective:     /70 (BP Location: Left arm, Patient Position: Sitting, Cuff Size: Adult)   Pulse 67   Ht 185.4 cm (73\")   Wt 98.3 kg (216 lb 12.8 oz)   SpO2 99%   BMI 28.60 kg/m²      Physical Exam  Constitutional: Patient appears well-developed and well-nourished.   Neck: No JVD present. No carotid bruits.   Cardiovascular: Normal rate, regular rhythm and normal heart sounds. No murmur heard.   2+ symmetric pulses.   Pulmonary/Chest: Breath sounds normal. Does not exhibit tenderness.    Musculoskeletal: Does not exhibit edema.   Neurological: Neurological exam unremarkable.   Vitals reviewed.    Data Review:   Lab Results   Component Value Date    GLUCOSE 127 (H) 12/27/2021    BUN 11 12/27/2021    CREATININE 0.91 12/27/2021    EGFRIFNONA 82 12/27/2021    BCR 12.1 12/27/2021    K 3.9 12/27/2021    CO2 26.0 12/27/2021    CALCIUM 9.0 12/27/2021    ALBUMIN 4.20 12/27/2021    AST 22 12/27/2021    ALT 24 12/27/2021      Lab Results   Component Value Date    WBC 5.15 12/27/2021    RBC 6.32 (H) 12/27/2021    HGB 17.8 (H) 02/11/2022    HCT 55.5 " (H) 02/11/2022    MCV 82.6 12/27/2021     12/27/2021         Assessment:      Diagnosis Plan   1. Coronary artery disease involving native coronary artery of native heart without angina pectoris  Stable without angina. Patient continues to have dyspnea on exertion with recent negative Echo and Stress test. Will obtain 48 holter and increase Toprol to 50mg BID.    2. Primary hypertension  Improved since increase in metoprolol dose. Continue current antihypertensive regimen.    3. Mixed dyslipidemia  Patient last lipid profile acceptable. Had labs drawn this AM. Will adjust medication if necessary pending results.      Plan:   Patient with recent negative stress and echo continues to have dyspnea on exertion. Dicussed possible etiologies and the importance of conditioning. Will obtain a 48 hour holter monitor to assess for dysrhythmia then we will increase Toprol 50mg to BID for better rate control.  Patient report response to therapy before we can prescribe higher dose of metoprolol..  Continue all other current medications.   FU in 3 MO, sooner as needed.  Thank you for allowing us to participate in the care of your patient.     Scribed for Anastacia Gary MD by Lin Boyd PA-C. 3/15/2022  09:28 EDT     I, Anastacia Gary MD, personally performed the services described in this documentation as scribed by the above named individual in my presence, and it is both accurate and complete.  3/15/2022  09:48 EDT        Part of this note may be an electronic transcription/translation of spoken language to printed text using the Dragon Dictation System.

## 2022-03-15 ENCOUNTER — OFFICE VISIT (OUTPATIENT)
Dept: CARDIOLOGY | Facility: CLINIC | Age: 72
End: 2022-03-15

## 2022-03-15 VITALS
HEIGHT: 73 IN | WEIGHT: 216.8 LBS | DIASTOLIC BLOOD PRESSURE: 70 MMHG | OXYGEN SATURATION: 99 % | BODY MASS INDEX: 28.73 KG/M2 | HEART RATE: 67 BPM | SYSTOLIC BLOOD PRESSURE: 125 MMHG

## 2022-03-15 DIAGNOSIS — E78.2 MIXED DYSLIPIDEMIA: ICD-10-CM

## 2022-03-15 DIAGNOSIS — I25.10 CORONARY ARTERY DISEASE INVOLVING NATIVE CORONARY ARTERY OF NATIVE HEART WITHOUT ANGINA PECTORIS: Primary | ICD-10-CM

## 2022-03-15 DIAGNOSIS — I10 PRIMARY HYPERTENSION: ICD-10-CM

## 2022-03-15 DIAGNOSIS — R06.09 DYSPNEA ON EXERTION: ICD-10-CM

## 2022-03-15 PROCEDURE — 99214 OFFICE O/P EST MOD 30 MIN: CPT | Performed by: INTERNAL MEDICINE

## 2022-03-15 RX ORDER — TESTOSTERONE CYPIONATE 100 MG/ML
VIAL (ML) INTRAMUSCULAR
COMMUNITY
End: 2022-05-04

## 2022-03-16 DIAGNOSIS — G47.33 OSA (OBSTRUCTIVE SLEEP APNEA): Primary | ICD-10-CM

## 2022-03-17 ENCOUNTER — PROCEDURE VISIT (OUTPATIENT)
Dept: UROLOGY | Facility: CLINIC | Age: 72
End: 2022-03-17

## 2022-03-17 DIAGNOSIS — E29.1 HYPOGONADISM IN MALE: Primary | ICD-10-CM

## 2022-03-17 PROCEDURE — 11980 IMPLANT HORMONE PELLET(S): CPT | Performed by: UROLOGY

## 2022-03-17 NOTE — PROGRESS NOTES
OFFICE PROCEDURE NOTE      PREPROCEDURE DIAGNOSIS:  Hypogonadism.      POSTPROCEDURE DIAGNOSIS:  Hypogonadism.      PROCEDURE:  Testopel (NDC# 17488-493-60) insertion/implant in right flank, 11 pellets  implanted.      SURGEON:    Nick Sommer MD    ANESTHESIA:  Local.      DRAINS:  None.      Labs reviewed,      The risks/benefits of the procedure were discussed with the patient.     DESCRIPTION OF PROCEDURE: The patient was placed in the lateral decubitus position and his skin was prepped with chlorhexadine solution. 10 ml of 1% lidocaine w/epinephrine were instilled subcutaneously in a straight fashion. A 3mm skin puncture was made with an 11 blade. The trochar was placed subcutaneously along the line with ease and without patient discomfort. The sharp stylet was removed and the pellets were placed along the track and positioned using the blunt stylet. Following this,  a steri strip was used to close the puncture wound. A 4/4 gauze pad was placed over the wound and a Tegaderm bandage was applied and the patient was repositioned on his opposite side for compression purposes with a roll.      PLAN:  The patient was discharged in stable condition to be followed up with serial serum testosterone levels. Follow up for next visit in 4 months.  Repeat testosterone levels in 2 months

## 2022-05-04 ENCOUNTER — OFFICE VISIT (OUTPATIENT)
Dept: PULMONOLOGY | Facility: CLINIC | Age: 72
End: 2022-05-04

## 2022-05-04 VITALS
OXYGEN SATURATION: 97 % | BODY MASS INDEX: 28.6 KG/M2 | RESPIRATION RATE: 18 BRPM | DIASTOLIC BLOOD PRESSURE: 66 MMHG | SYSTOLIC BLOOD PRESSURE: 108 MMHG | HEIGHT: 73 IN | HEART RATE: 75 BPM

## 2022-05-04 DIAGNOSIS — G47.33 OBSTRUCTIVE SLEEP APNEA: Primary | ICD-10-CM

## 2022-05-04 DIAGNOSIS — J30.89 OTHER ALLERGIC RHINITIS: ICD-10-CM

## 2022-05-04 DIAGNOSIS — J45.30 MILD PERSISTENT ASTHMA WITHOUT COMPLICATION: ICD-10-CM

## 2022-05-04 PROCEDURE — 99214 OFFICE O/P EST MOD 30 MIN: CPT | Performed by: NURSE PRACTITIONER

## 2022-05-04 RX ORDER — AZELASTINE 1 MG/ML
1 SPRAY, METERED NASAL 2 TIMES DAILY PRN
Qty: 90 ML | Refills: 1 | Status: SHIPPED | OUTPATIENT
Start: 2022-05-04 | End: 2022-09-06 | Stop reason: SDUPTHER

## 2022-05-04 RX ORDER — RABEPRAZOLE SODIUM 20 MG/1
TABLET, DELAYED RELEASE ORAL
COMMUNITY
Start: 2022-03-16

## 2022-05-04 RX ORDER — VENLAFAXINE HYDROCHLORIDE 150 MG/1
CAPSULE, EXTENDED RELEASE ORAL
COMMUNITY
Start: 2022-04-20

## 2022-05-04 NOTE — PROGRESS NOTES
"Chief Complaint   Patient presents with   • Follow-up   • Sleeping Problem         Subjective   José Miguel Jerry is a 72 y.o. male.     History of Present Illness   The patient comes in today for follow-up of shortness of breath and obstructive sleep apnea.    Patient says that since the last office visit he has had no recent exacerbations. he denies any emergency room visits or hospitalizations, due to his asthma.     The patient says that he is compliant with pulmonary medicines, as prescribed.  He was given a sample of Breo at his last visit to use for 2 weeks. He states he could not tell a difference.     He does not use the albuterol.    He uses Astelin and it helped.     He takes Singulair in the evening.     He states that he had some medication changes and he is no longer having shortness of breath. No increased cough.   He changed from Pristiq to effexor.     He was having difficulty tolerating CPAP therapy and had a titration study in March.  The titration study revealed the patient did well with BiPAP at a final pressure of 14/6.    He has not been set up with BiPAP yet.       The following portions of the patient's history were reviewed and updated as appropriate: allergies, current medications, past family history, past medical history, past social history and past surgical history.    Review of Systems   HENT: Negative for sinus pain.    Respiratory: Negative for cough.    Psychiatric/Behavioral: Negative for sleep disturbance.       Objective   Visit Vitals  /66   Pulse 75   Resp 18   Ht 185.4 cm (73\")   SpO2 97%   BMI 28.60 kg/m²         Physical Exam  Vitals reviewed.   HENT:      Head: Atraumatic.   Cardiovascular:      Rate and Rhythm: Normal rate and regular rhythm.   Pulmonary:      Effort: Pulmonary effort is normal. No respiratory distress.   Musculoskeletal:      Cervical back: Neck supple.   Neurological:      Mental Status: He is alert and oriented to person, place, and time. "           Assessment/Plan   Diagnoses and all orders for this visit:    1. Obstructive sleep apnea (Primary)    2. Other allergic rhinitis    3. Mild persistent asthma without complication    Other orders  -     azelastine (ASTELIN) 0.1 % nasal spray; 1 spray into the nostril(s) as directed by provider 2 (Two) Times a Day As Needed for Rhinitis or Allergies. Use in each nostril as directed  Dispense: 90 mL; Refill: 1           Return in about 4 months (around 9/4/2022) for Recheck, For Dr. Benz.    DISCUSSION (if any):  His previous spirometry in November 2021 showed no obstruction.    His symptoms of asthma are under adequate control at this time.  He should continue Singulair.    Allergic rhinitis symptoms seem well controlled with Astelin and he should continue this medication on a regular basis.    Patient's medications for underlying asthma were reviewed with him in great detail.    Any needed adjustments to his pulmonary medications, either for clinical or insurance coverage reasons, have been made and are reflected in the orders.    Side effects of prescribed medications discussed with the patient.    Asthma action plan with discussed with him.    The patient was asked to call this office if the symptoms worsen.    Start treatment with BiPAP at a pressure of 14/6, with a full-face mask.    I have asked the office staff to call [facility] and see why he has not been set up with BiPAP yet.  If [facility] is not able to set the patient up with BiPAP soon then I will try to send the order to another [employer] company.    Humidification setup, hose and mask care discussed.    Weight loss advised.    Use every night for at least 4 hours stressed.    Dictated utilizing Dragon dictation.    This document was electronically signed by ERENDIRA Martinez May 4, 2022  09:47 EDT

## 2022-06-06 ENCOUNTER — LAB (OUTPATIENT)
Dept: LAB | Facility: HOSPITAL | Age: 72
End: 2022-06-06

## 2022-06-06 ENCOUNTER — TELEPHONE (OUTPATIENT)
Dept: UROLOGY | Facility: CLINIC | Age: 72
End: 2022-06-06

## 2022-06-06 DIAGNOSIS — R79.89 LOW TESTOSTERONE: Primary | ICD-10-CM

## 2022-06-06 DIAGNOSIS — R79.89 LOW TESTOSTERONE: ICD-10-CM

## 2022-06-06 LAB
HCT VFR BLD AUTO: 54.6 % (ref 37.5–51)
HGB BLD-MCNC: 18.2 G/DL (ref 13–17.7)
TESTOST SERPL-MCNC: 348 NG/DL (ref 193–740)

## 2022-06-06 PROCEDURE — 85018 HEMOGLOBIN: CPT

## 2022-06-06 PROCEDURE — 84403 ASSAY OF TOTAL TESTOSTERONE: CPT

## 2022-06-06 PROCEDURE — 36415 COLL VENOUS BLD VENIPUNCTURE: CPT

## 2022-06-06 PROCEDURE — 85014 HEMATOCRIT: CPT

## 2022-07-04 NOTE — PROGRESS NOTES
[facility] Cardiology    Encounter Date: 2022    Patient ID: José Miguel Jerry is a 72 y.o. male.  : 1950     PCP: Hernandez Jackman MD       Chief Complaint: Coronary artery disease involving native coronary artery of      PROBLEM LIST:  1. Coronary artery disease  a. Echocardiogram, 2018: EF >60%. AV leaflets mildly thickened. Mild AR. No significant AS.   b. Cardiac cath 2018: Nonobstructive plaque disease involving multiple vessels without evidence of hemodynamically significant coronary artery disease.  c. Cardiolite stress test, 2021: EF 59%. No evidence of ischemia.   d. Echocardiogram, 2022: EF 59%. Mild AR. Trace MR. trent. Holter monitor, 03/15/2022; SR. Rare APCs, and PVCs. No significant arrhythmias.   2. Hypertension   3. Mixed dyslipidemia   4. GERD  5. Polycythemia  6. Dysesthesia  7. Neuropathic pain  8. Erectile dysfunction after radial prostatectomy  10. Prostate cancer  11. Low testosterone       History of Present Illness  Patient presents today for a follow-up with a history of coronary artery disease and cardiac risk factors. Since last visit, the patient has been doing well overall from a cardiovascular standpoint. Patient maintains a stable activity level by working around the house. Patient states his shortness of breath as improved 95%. He states he maintains a healthy diet by having at least 5 servings of fruits or vegetables a day with occasional sweets. Patient denies chest pain, shortness of breath, orthopnea, palpitations, edema, dizziness, and syncope.     Allergies   Allergen Reactions   • Chlorhexidine Rash   • Clindamycin/Lincomycin Rash     itching   • Neosporin [Bacitracin-Polymyxin B] Itching and Rash         Current Outpatient Medications:   •  amLODIPine-benazepril (LOTREL) 10-40 MG per capsule, Take 1 capsule by mouth Daily., Disp: , Rfl:   •  aspirin 81 MG EC tablet, Take 81 mg by mouth Daily., Disp: , Rfl:    •  azelastine (ASTELIN) 0.1 % nasal spray, 1 spray into the nostril(s) as directed by provider 2 (Two) Times a Day As Needed for Rhinitis or Allergies. Use in each nostril as directed, Disp: 90 mL, Rfl: 1  •  esomeprazole (nexIUM) 20 MG capsule, As Needed., Disp: , Rfl:   •  hydrochlorothiazide (MICROZIDE) 12.5 MG capsule, Take 12.5 mg by mouth Every Morning., Disp: , Rfl:   •  metoprolol succinate XL (TOPROL-XL) 25 MG 24 hr tablet, Daily., Disp: , Rfl:   •  metroNIDAZOLE (METROGEL) 0.75 % gel, Apply 1 application topically to the appropriate area as directed Daily., Disp: , Rfl:   •  montelukast (SINGULAIR) 10 MG tablet, Take 10 mg by mouth Every Night., Disp: , Rfl:   •  Multiple Vitamins-Minerals (MULTIVITAMIN ADULTS PO), Take 1 tablet by mouth Daily., Disp: , Rfl:   •  nortriptyline (PAMELOR) 10 MG capsule, Take 30 mg by mouth Every Night., Disp: , Rfl:   •  RABEprazole (ACIPHEX) 20 MG EC tablet, , Disp: , Rfl:   •  simvastatin (ZOCOR) 20 MG tablet, Take 20 mg by mouth Every Night., Disp: , Rfl:   •  venlafaxine XR (EFFEXOR-XR) 150 MG 24 hr capsule, , Disp: , Rfl:     The following portions of the patient's history were reviewed and updated as appropriate: allergies, current medications, past family history, past medical history, past social history, past surgical history and problem list.    ROS  Review of Systems   Constitution: Negative for chills, fever, fatigue, generalized weakness.   Cardiovascular: Negative for chest pain, dyspnea on exertion, leg swelling, palpitations, orthopnea, and syncope.   Respiratory: Negative for cough, shortness of breath, and wheezing.  HENT: Negative for ear pain, nosebleeds, and tinnitus.  Gastrointestinal: Negative for abdominal pain, constipation, diarrhea, nausea and vomiting.   Genitourinary: No urinary symptoms.  Musculoskeletal: Negative for muscle cramps.  Neurological: Negative for dizziness, headaches, loss of balance, numbness, and symptoms of  "stroke.  Psychiatric: Normal mental status.     All other systems reviewed and are negative.        Objective:     /68 (BP Location: Right arm, Patient Position: Sitting)   Pulse 90   Ht 185.4 cm (73\")   Wt 99.2 kg (218 lb 9.6 oz)   SpO2 95%   BMI 28.84 kg/m²      Physical Exam  Constitutional: Patient appears well-developed and well-nourished.   HENT: HEENT exam unremarkable.   Neck: Neck supple. No JVD present. No carotid bruits.   Cardiovascular: Normal rate, regular rhythm and normal heart sounds. No murmur heard.   2+ symmetric pulses.   Pulmonary/Chest: Breath sounds normal. Does not exhibit tenderness.   Abdominal: Abdomen benign.   Musculoskeletal: Does not exhibit edema.   Neurological: Neurological exam unremarkable.   Vitals reviewed.    Data Review:   Lab Results   Component Value Date    GLUCOSE 127 (H) 12/27/2021    BUN 11 12/27/2021    CREATININE 0.91 12/27/2021    EGFRIFNONA 82 12/27/2021    BCR 12.1 12/27/2021    K 3.9 12/27/2021    CO2 26.0 12/27/2021    CALCIUM 9.0 12/27/2021    ALBUMIN 4.20 12/27/2021    AST 22 12/27/2021    ALT 24 12/27/2021     Lab Results   Component Value Date    CHOL 113 08/20/2018    TRIG 81 08/20/2018    HDL 34 (L) 08/20/2018    LDL 75 08/20/2018      Lab Results   Component Value Date    WBC 5.15 12/27/2021    RBC 6.32 (H) 12/27/2021    HGB 18.2 (H) 06/06/2022    HCT 54.6 (H) 06/06/2022    MCV 82.6 12/27/2021     12/27/2021        Procedures             Assessment:      Diagnosis Plan   1. Coronary artery disease involving native coronary artery of native heart without angina pectoris  Stable without angina on current activity. Continue on aspirin 81 mg.    2. Essential hypertension  Well controlled. Continue on Lotrel 10-40 mg, HCTZ 12.5 mg, and metoprolol 25 mg.    3. Mixed dyslipidemia  Well controlled. Continue on simvastatin 20 mg.      Plan:   Stable cardiac status.   Patient was encouraged to continue to be active and have a heart healthy " diet.  Continue all other current medications.   FU in 12 MO, sooner as needed.  Thank you for allowing us to participate in the care of your patient.       Scribed for Anastacia Gary MD by Nataly Alberts. 7/5/2022 10:24 EDT         I, Anastacia Gary MD, personally performed the services described in this documentation as scribed by the above named individual in my presence, and it is both accurate and complete.  7/5/2022  10:40 EDT        Please note that portions of this note may have been completed with a voice recognition program. Efforts were made to edit the dictations, but occasionally words are mistranscribed.

## 2022-07-05 ENCOUNTER — OFFICE VISIT (OUTPATIENT)
Dept: CARDIOLOGY | Facility: CLINIC | Age: 72
End: 2022-07-05

## 2022-07-05 VITALS
WEIGHT: 218.6 LBS | HEART RATE: 90 BPM | HEIGHT: 73 IN | DIASTOLIC BLOOD PRESSURE: 68 MMHG | SYSTOLIC BLOOD PRESSURE: 126 MMHG | BODY MASS INDEX: 28.97 KG/M2 | OXYGEN SATURATION: 95 %

## 2022-07-05 DIAGNOSIS — I10 ESSENTIAL HYPERTENSION: ICD-10-CM

## 2022-07-05 DIAGNOSIS — E78.2 MIXED DYSLIPIDEMIA: ICD-10-CM

## 2022-07-05 DIAGNOSIS — I25.10 CORONARY ARTERY DISEASE INVOLVING NATIVE CORONARY ARTERY OF NATIVE HEART WITHOUT ANGINA PECTORIS: Primary | ICD-10-CM

## 2022-07-05 PROCEDURE — 99214 OFFICE O/P EST MOD 30 MIN: CPT | Performed by: INTERNAL MEDICINE

## 2022-07-05 RX ORDER — METOPROLOL SUCCINATE 25 MG/1
TABLET, EXTENDED RELEASE ORAL DAILY
COMMUNITY
Start: 2022-05-05

## 2022-07-18 ENCOUNTER — PROCEDURE VISIT (OUTPATIENT)
Dept: UROLOGY | Facility: CLINIC | Age: 72
End: 2022-07-18

## 2022-07-18 ENCOUNTER — HOSPITAL ENCOUNTER (OUTPATIENT)
Dept: INFUSION THERAPY | Facility: HOSPITAL | Age: 72
Setting detail: INFUSION SERIES
Discharge: HOME | End: 2022-07-18

## 2022-07-18 VITALS
HEART RATE: 63 BPM | TEMPERATURE: 98.7 F | DIASTOLIC BLOOD PRESSURE: 61 MMHG | RESPIRATION RATE: 18 BRPM | OXYGEN SATURATION: 96 % | SYSTOLIC BLOOD PRESSURE: 108 MMHG

## 2022-07-18 DIAGNOSIS — D75.1 POLYCYTHEMIA: Primary | ICD-10-CM

## 2022-07-18 DIAGNOSIS — E29.1 HYPOGONADISM IN MALE: Primary | ICD-10-CM

## 2022-07-18 PROCEDURE — 99195 PHLEBOTOMY: CPT

## 2022-07-18 PROCEDURE — 11980 IMPLANT HORMONE PELLET(S): CPT | Performed by: UROLOGY

## 2022-07-18 RX ORDER — SODIUM CHLORIDE 9 MG/ML
250 INJECTION, SOLUTION INTRAVENOUS ONCE
Status: CANCELLED | OUTPATIENT
Start: 2022-07-18

## 2022-07-18 RX ORDER — SODIUM CHLORIDE 9 MG/ML
250 INJECTION, SOLUTION INTRAVENOUS ONCE
Status: CANCELLED | OUTPATIENT
Start: 2022-07-24

## 2022-07-18 RX ORDER — SODIUM CHLORIDE 9 MG/ML
250 INJECTION, SOLUTION INTRAVENOUS ONCE
Status: DISCONTINUED | OUTPATIENT
Start: 2022-07-18 | End: 2022-07-20 | Stop reason: HOSPADM

## 2022-07-18 NOTE — PROGRESS NOTES
OFFICE PROCEDURE NOTE      PREPROCEDURE DIAGNOSIS:  Hypogonadism.      POSTPROCEDURE DIAGNOSIS:  Hypogonadism.      PROCEDURE:  Testopel (NDC# 12077-046-09) insertion/implant in Left flank, 11 pellets implanted.      SURGEON:    Nick Sommer MD    ANESTHESIA:  Local.      Labs reviewed     The risks/benefits of the procedure were discussed with the patient.     DESCRIPTION OF PROCEDURE: The patient was placed in the lateral decubitus position and his skin was prepped with chlorhexadine solution. 10 ml of 1% lidocaine w/epinephrine were instilled subcutaneously in a straight fashion. A 3mm skin puncture was made with an 11 blade. The trochar was placed subcutaneously along the line with ease and without patient discomfort. The sharp stylet was removed and the pellets were placed along the track and positioned using the blunt stylet. Following this,  a steri strip was used to close the puncture wound. A 4/4 gauze pad was placed over the wound and a Tegaderm bandage was applied and the patient was repositioned on his opposite side for compression purposes with a roll.      PLAN:  The patient was discharged in stable condition to be followed up with serial serum testosterone levels. Follow up for next visit in 4 months.

## 2022-09-06 ENCOUNTER — OFFICE VISIT (OUTPATIENT)
Dept: PULMONOLOGY | Facility: CLINIC | Age: 72
End: 2022-09-06

## 2022-09-06 VITALS
HEART RATE: 76 BPM | WEIGHT: 224 LBS | DIASTOLIC BLOOD PRESSURE: 64 MMHG | SYSTOLIC BLOOD PRESSURE: 122 MMHG | HEIGHT: 73 IN | RESPIRATION RATE: 16 BRPM | BODY MASS INDEX: 29.69 KG/M2 | OXYGEN SATURATION: 97 %

## 2022-09-06 DIAGNOSIS — J45.30 MILD PERSISTENT ASTHMA WITHOUT COMPLICATION: ICD-10-CM

## 2022-09-06 DIAGNOSIS — G47.33 OBSTRUCTIVE SLEEP APNEA: Primary | ICD-10-CM

## 2022-09-06 DIAGNOSIS — J30.89 OTHER ALLERGIC RHINITIS: ICD-10-CM

## 2022-09-06 PROCEDURE — 99214 OFFICE O/P EST MOD 30 MIN: CPT | Performed by: INTERNAL MEDICINE

## 2022-09-06 RX ORDER — MINOCYCLINE HYDROCHLORIDE 100 MG/1
CAPSULE ORAL
COMMUNITY
Start: 2022-08-31

## 2022-09-06 RX ORDER — TIOTROPIUM BROMIDE AND OLODATEROL 3.124; 2.736 UG/1; UG/1
SPRAY, METERED RESPIRATORY (INHALATION)
COMMUNITY
Start: 2022-07-24 | End: 2022-09-06

## 2022-09-06 RX ORDER — DICYCLOMINE HYDROCHLORIDE 10 MG/1
CAPSULE ORAL
COMMUNITY
Start: 2022-08-25

## 2022-09-06 RX ORDER — ALBUTEROL SULFATE 90 UG/1
2 AEROSOL, METERED RESPIRATORY (INHALATION) EVERY 4 HOURS PRN
Qty: 54 G | Refills: 0 | Status: SHIPPED | OUTPATIENT
Start: 2022-09-06

## 2022-09-06 RX ORDER — AZELASTINE 1 MG/ML
1 SPRAY, METERED NASAL 2 TIMES DAILY PRN
Qty: 90 ML | Refills: 1 | Status: SHIPPED | OUTPATIENT
Start: 2022-09-06

## 2022-09-06 RX ORDER — MONTELUKAST SODIUM 10 MG/1
10 TABLET ORAL NIGHTLY
Qty: 90 TABLET | Refills: 2 | Status: SHIPPED | OUTPATIENT
Start: 2022-09-06

## 2022-09-06 RX ORDER — DESVENLAFAXINE 100 MG/1
TABLET, EXTENDED RELEASE ORAL
COMMUNITY
Start: 2022-07-20

## 2022-09-06 NOTE — PROGRESS NOTES
"  Chief Complaint   Patient presents with   • Sleeping Problem   • Follow-up       Subjective   José Miguel eJrry is a 72 y.o. male.     History of Present Illness   Patient was evaluated today for follow up of asthma, allergic rhinitis and TULIO. Patient does not report any recent exacerbations requiring emergency room visits or hospitalizations.    Patient is compliant with pulmonary medicines, as prescribed.     he is currently on Stiolto but uses it once or twice a week. he is using the rescue inhalers minimally.     Patient says that he has not been using his nasal sprays on a seasonal basis, due to the fact that prescription ran out, and is noticing worsening nasal congestion as well as occasional runny nose.    Patient doesn't report any issues with the PAP device. The patient describes no significant issues with his mask either.     Patient says that the compliance with the use of the equipment is good.     Patient says that his symptoms of fatigue & daytime sleepiness have been helped greatly with the use of PAP, as prescribed.     The following portions of the patient's history were reviewed and updated as appropriate: allergies, current medications, past family history, past medical history, past social history and past surgical history.    Review of Systems   HENT: Positive for sinus pressure.    Respiratory: Negative for shortness of breath.    Cardiovascular: Negative for palpitations.   Psychiatric/Behavioral: Negative for sleep disturbance.       Objective   Visit Vitals  /64   Pulse 76   Resp 16   Ht 185.4 cm (73\")   Wt 102 kg (224 lb)   SpO2 97%   BMI 29.55 kg/m²       Physical Exam  Vitals reviewed.   Constitutional:       Appearance: He is well-developed.   HENT:      Head: Normocephalic and atraumatic.   Eyes:      Extraocular Movements: Extraocular movements intact.   Cardiovascular:      Rate and Rhythm: Normal rate.   Pulmonary:      Comments: Normal to percussion.  Somewhat decreased air " entry.  No obvious wheezing noted.   Musculoskeletal:      Cervical back: Neck supple.   Neurological:      Mental Status: He is alert.         Assessment & Plan   Diagnoses and all orders for this visit:    1. Obstructive sleep apnea (Primary)    2. Mild persistent asthma without complication    3. Other allergic rhinitis    Other orders  -     azelastine (ASTELIN) 0.1 % nasal spray; 1 spray into the nostril(s) as directed by provider 2 (Two) Times a Day As Needed for Rhinitis or Allergies. Use in each nostril as directed  Dispense: 90 mL; Refill: 1  -     montelukast (SINGULAIR) 10 MG tablet; Take 1 tablet by mouth Every Night.  Dispense: 90 tablet; Refill: 2  -     albuterol sulfate HFA (Ventolin HFA) 108 (90 Base) MCG/ACT inhaler; Inhale 2 puffs Every 4 (Four) Hours As Needed for Wheezing or Shortness of Air.  Dispense: 54 g; Refill: 0           Return in about 6 months (around 3/6/2023) for Recheck, For Shyla Inman), ....Also 13 mths w/ Dr. Benz.    DISCUSSION (if any):  It appears that his symptoms of asthma are under adequate control with the current regimen.    Patient's medications for underlying asthma were reviewed in great detail.    Patient was informed about the black box warning for Singulair regarding suicidal thoughts and tendencies.  he denies any ongoing issues for now.     Since the patient is doing fairly well as far as respiratory symptoms are concerned, I have recommended that he discontinued using Stiolto.    The patient maybe asked to start Symbicort or Breo on a regular basis if his symptoms worsen or if he requires his rescue inhaler more than 4 to 5 times a week or if he has any night time symptoms or if he has any exacerbation while he is not on regular schedule dose of a maintenance inhaler.     Compliance with medications stressed.     Side effects of prescribed medications discussed with the patient.    The need to continue to be aware of triggers that may cause asthma  exacerbation versus progression of disease, was also discussed.    I have discussed asthma action plan with him.    The patient was asked to call this office if the symptoms worsen.    Due to symptoms suggestive of poorly controlled allergic rhinitis, he was prescribed additional nasal spray with the advise to use all the recommended/prescribed nasal sprays on a regular basis.    I reviewed the results of last titration study in detail. It was performed in March 2022. He did very well on BiPAP 14/6 at the time of titration study.     Continue treatment with BiPAP at a pressure of 14/6, with a full-face mask.    Patient seems to be compliant with PAP device, based on the available data and his account of improved symptoms.     Compliance data was obtained from the his device/DME company.    Sleep hygiene measures were discussed in great detail including need to follow a strict bedtime and to avoid electronic devices in bed and close to bedtime.    he was also asked to avoid caffeinated or alcoholic beverages within 4 to 6 hours of bedtime.    The patient was once again reminded to continue using the PAP device regularly, every night for atleast 4 hours.      Dictated utilizing Dragon dictation.    This document was electronically signed by Salome Benz MD on 09/06/22 at 10:58 EDT

## 2022-09-07 RX ORDER — SODIUM CHLORIDE 9 MG/ML
250 INJECTION, SOLUTION INTRAVENOUS ONCE
Status: CANCELLED | OUTPATIENT
Start: 2022-09-07

## 2022-09-15 ENCOUNTER — HOSPITAL ENCOUNTER (OUTPATIENT)
Dept: INFUSION THERAPY | Facility: HOSPITAL | Age: 72
Setting detail: INFUSION SERIES
Discharge: HOME | End: 2022-09-15

## 2022-09-15 VITALS
DIASTOLIC BLOOD PRESSURE: 66 MMHG | OXYGEN SATURATION: 100 % | TEMPERATURE: 98.3 F | SYSTOLIC BLOOD PRESSURE: 130 MMHG | RESPIRATION RATE: 18 BRPM | HEART RATE: 74 BPM

## 2022-09-15 DIAGNOSIS — D75.1 POLYCYTHEMIA: Primary | ICD-10-CM

## 2022-09-15 LAB
HCT VFR BLD AUTO: 52.5 % (ref 37.5–51)
HGB BLD-MCNC: 17.6 G/DL (ref 13–17.7)

## 2022-09-15 PROCEDURE — 85018 HEMOGLOBIN: CPT | Performed by: UROLOGY

## 2022-09-15 PROCEDURE — 85014 HEMATOCRIT: CPT | Performed by: UROLOGY

## 2022-09-15 PROCEDURE — 36415 COLL VENOUS BLD VENIPUNCTURE: CPT

## 2022-09-15 PROCEDURE — 99195 PHLEBOTOMY: CPT

## 2022-09-15 RX ORDER — SODIUM CHLORIDE 9 MG/ML
250 INJECTION, SOLUTION INTRAVENOUS ONCE
OUTPATIENT
Start: 2022-09-22

## 2022-09-15 RX ORDER — SODIUM CHLORIDE 9 MG/ML
250 INJECTION, SOLUTION INTRAVENOUS ONCE
Status: DISCONTINUED | OUTPATIENT
Start: 2022-09-15 | End: 2022-09-17 | Stop reason: HOSPADM

## 2022-11-11 ENCOUNTER — LAB (OUTPATIENT)
Dept: UROLOGY | Facility: CLINIC | Age: 72
End: 2022-11-11

## 2022-12-01 ENCOUNTER — PROCEDURE VISIT (OUTPATIENT)
Dept: UROLOGY | Facility: CLINIC | Age: 72
End: 2022-12-01

## 2022-12-01 VITALS
BODY MASS INDEX: 29.69 KG/M2 | HEART RATE: 91 BPM | WEIGHT: 224 LBS | TEMPERATURE: 97.5 F | HEIGHT: 73 IN | SYSTOLIC BLOOD PRESSURE: 126 MMHG | DIASTOLIC BLOOD PRESSURE: 74 MMHG | OXYGEN SATURATION: 96 %

## 2022-12-01 DIAGNOSIS — C61 PROSTATE CANCER: ICD-10-CM

## 2022-12-01 DIAGNOSIS — D75.1 POLYCYTHEMIA: ICD-10-CM

## 2022-12-01 DIAGNOSIS — E29.1 HYPOGONADISM IN MALE: Primary | ICD-10-CM

## 2022-12-01 PROCEDURE — 11980 IMPLANT HORMONE PELLET(S): CPT | Performed by: UROLOGY

## 2022-12-01 NOTE — PROGRESS NOTES
OFFICE PROCEDURE NOTE      PREPROCEDURE DIAGNOSIS:  Hypogonadism.      POSTPROCEDURE DIAGNOSIS:  Hypogonadism.      PROCEDURE:  Testopel (NDC# 47018-718-17) insertion/implant in RIGHT flank, 11 pellets implanted.      SURGEON:    Ncik Sommer MD    ANESTHESIA:  Local.      Labs reviewed   Latest Reference Range & Units 11/11/22 13:48   Testosterone, Total 264 - 916 ng/dL 262 (L)   Hemoglobin 13.0 - 17.7 g/dL 17.1   Hematocrit 37.5 - 51.0 % 50.8   (L): Data is abnormally low    Lab Results   Component Value Date    PSA <0.014 01/14/2021    PSA <0.014 10/21/2020    PSA <0.014 02/24/2020        The risks/benefits of the procedure were discussed with the patient.     DESCRIPTION OF PROCEDURE: The patient was placed in the lateral decubitus position and his skin was prepped with chlorhexadine solution. 10 ml of 1% lidocaine w/epinephrine were instilled subcutaneously in a straight fashion. A 3mm skin puncture was made with an 11 blade. The trochar was placed subcutaneously along the line with ease and without patient discomfort. The sharp stylet was removed and the pellets were placed along the track and positioned using the blunt stylet. Following this,  a steri strip was used to close the puncture wound. A 4/4 gauze pad was placed over the wound and a Tegaderm bandage was applied and the patient was repositioned on his opposite side for compression purposes with a roll.      PLAN:  The patient was discharged in stable condition to be followed up with serial serum testosterone levels. Follow up for next visit in 4 months.

## 2023-03-07 ENCOUNTER — OFFICE VISIT (OUTPATIENT)
Dept: PULMONOLOGY | Facility: CLINIC | Age: 73
End: 2023-03-07
Payer: MEDICARE

## 2023-03-07 VITALS
BODY MASS INDEX: 29.53 KG/M2 | DIASTOLIC BLOOD PRESSURE: 76 MMHG | OXYGEN SATURATION: 96 % | HEART RATE: 62 BPM | RESPIRATION RATE: 18 BRPM | HEIGHT: 73 IN | WEIGHT: 222.8 LBS | SYSTOLIC BLOOD PRESSURE: 128 MMHG

## 2023-03-07 DIAGNOSIS — J45.30 MILD PERSISTENT ASTHMA WITHOUT COMPLICATION: ICD-10-CM

## 2023-03-07 DIAGNOSIS — J30.89 OTHER ALLERGIC RHINITIS: ICD-10-CM

## 2023-03-07 DIAGNOSIS — G47.33 OBSTRUCTIVE SLEEP APNEA: Primary | ICD-10-CM

## 2023-03-07 PROCEDURE — 99214 OFFICE O/P EST MOD 30 MIN: CPT | Performed by: NURSE PRACTITIONER

## 2023-03-07 NOTE — PROGRESS NOTES
"Chief Complaint   Patient presents with   • Sleeping Problem   • Follow-up         Subjective   José Miguel Jerry is a 72 y.o. male.     History of Present Illness   Patient comes back today for follow up of Obstructive Sleep apnea and asthma.     Patient says that he is compliant with his device and using it regularly.    Patient's symptoms of sleep disturbance and daytime sleepiness have been helped greatly with the use of PAP device, as prescribed. He feels rested most days upon awakening. He has no issue with machine.     Patient says that since the last office visit he has had no recent exacerbations. he denies any emergency room visits or hospitalizations, due to his asthma.     The patient says that he is compliant with pulmonary medicines, as prescribed. He is not using DAMIAN often.     He takes singulair nightly.     He uses Astelin when needed and it helps.     He has never smoked.     The following portions of the patient's history were reviewed and updated as appropriate: allergies, current medications, past family history, past medical history, past social history and past surgical history.       Review of Systems   HENT: Negative for sinus pressure, sneezing and sore throat.    Respiratory: Negative for cough, chest tightness, shortness of breath and wheezing.    Psychiatric/Behavioral: Negative for sleep disturbance.       Objective   Visit Vitals  /76   Pulse 62   Resp 18   Ht 185.4 cm (72.99\")   Wt 101 kg (222 lb 12.8 oz)   SpO2 96%   BMI 29.40 kg/m²         Physical Exam  Vitals reviewed.   HENT:      Head: Atraumatic.      Mouth/Throat:      Mouth: Mucous membranes are moist.      Comments: Crowded oropharynx.   Eyes:      Extraocular Movements: Extraocular movements intact.   Cardiovascular:      Rate and Rhythm: Normal rate and regular rhythm.   Pulmonary:      Effort: Pulmonary effort is normal. No respiratory distress.      Breath sounds: No wheezing.   Musculoskeletal:      Comments: Gait " normal.   Skin:     General: Skin is warm.   Neurological:      Mental Status: He is alert and oriented to person, place, and time.           Assessment & Plan   Diagnoses and all orders for this visit:    1. Obstructive sleep apnea (Primary)  -     BIPAP / CPAP Adjustment    2. Mild persistent asthma without complication    3. Other allergic rhinitis           Return in about 1 year (around 3/7/2024) for Recheck, For Dr. Benz.    DISCUSSION (if any):  I reviewed the results of last titration study in detail. It was performed in March 2022. He did very well on BiPAP 14/6 at the time of titration study.     Continue treatment with BiPAP at a pressure of 14/6, with a full-face mask.    Patient's compliance data was reviewed and the compliance is greater than 90%.    Sleep hygiene measures were discussed in great detail including need to follow a strict bedtime and to avoid electronic devices in bed and close to bedtime.    Humidification setup, hose and mask care discussed.    Weight loss advised.    Use every night for at least 4 hours stressed.    PFT 11/2021 consistent with no obstruction.     Continue Singulair and DAMIAN as needed for SOB and wheezing. He does not need refills at this time.     His symptoms of asthma are under adequate control at this time.    Patient's medications for underlying asthma were reviewed with him in great detail.    Any needed adjustments to his pulmonary medications, either for clinical or insurance coverage reasons, have been made and are reflected in the orders.    Side effects of prescribed medications discussed with the patient.    Asthma action plan with discussed with him.    The patient was asked to call this office if the symptoms worsen.    Dictated utilizing Dragon dictation.    This document was electronically signed by ERENDIRA Martinez March 7, 2023  09:30 EST

## 2023-03-27 ENCOUNTER — LAB (OUTPATIENT)
Dept: UROLOGY | Facility: CLINIC | Age: 73
End: 2023-03-27
Payer: MEDICARE

## 2023-03-27 DIAGNOSIS — C61 PROSTATE CANCER: ICD-10-CM

## 2023-03-27 DIAGNOSIS — E29.1 HYPOGONADISM IN MALE: Primary | ICD-10-CM

## 2023-03-27 DIAGNOSIS — D75.1 POLYCYTHEMIA: ICD-10-CM

## 2023-03-28 LAB
ESTRADIOL SERPL-MCNC: 41.5 PG/ML (ref 7.6–42.6)
HCT VFR BLD AUTO: 54.7 % (ref 37.5–51)
HGB BLD-MCNC: 17.9 G/DL (ref 13–17.7)
PSA SERPL-MCNC: <0.014 NG/ML (ref 0–4)
TESTOST SERPL-MCNC: 229 NG/DL (ref 264–916)

## 2023-04-10 ENCOUNTER — PROCEDURE VISIT (OUTPATIENT)
Dept: UROLOGY | Facility: CLINIC | Age: 73
End: 2023-04-10
Payer: MEDICARE

## 2023-04-10 DIAGNOSIS — E29.1 HYPOGONADISM IN MALE: Primary | ICD-10-CM

## 2023-04-10 PROCEDURE — 11980 IMPLANT HORMONE PELLET(S): CPT | Performed by: UROLOGY

## 2023-04-10 NOTE — PROGRESS NOTES
OFFICE PROCEDURE NOTE      PREPROCEDURE DIAGNOSIS:  Hypogonadism.      POSTPROCEDURE DIAGNOSIS:  Hypogonadism.      PROCEDURE:  Testopel (NDC# 00087-342-59) insertion/implant in RIGHT flank, 11 pellets implanted.      SURGEON:    Nick Sommer MD    ANESTHESIA:  Local.      Labs reviewed   Latest Reference Range & Units 03/27/23 13:17   Testosterone, Total 264 - 916 ng/dL 229 (L)   Hemoglobin 13.0 - 17.7 g/dL 17.9 (H)   Hematocrit 37.5 - 51.0 % 54.7 (H)   (L): Data is abnormally low  (H): Data is abnormally high    Lab Results   Component Value Date    PSA <0.014 03/27/2023    PSA <0.014 01/14/2021    PSA <0.014 10/21/2020        The risks/benefits of the procedure were discussed with the patient.     DESCRIPTION OF PROCEDURE: The patient was placed in the lateral decubitus position and his skin was prepped with chlorhexadine solution. 10 ml of 1% lidocaine w/epinephrine were instilled subcutaneously in a straight fashion. A 3mm skin puncture was made with an 11 blade. The trochar was placed subcutaneously along the line with ease and without patient discomfort. The sharp stylet was removed and the pellets were placed along the track and positioned using the blunt stylet. Following this,  a steri strip was used to close the puncture wound. A 4/4 gauze pad was placed over the wound and a Tegaderm bandage was applied and the patient was repositioned on his opposite side for compression purposes with a roll.      PLAN:  The patient was discharged in stable condition to be followed up with serial serum testosterone levels. Follow up for next visit in 4 months.    Restart phlebotomy every 3 months

## 2023-06-19 RX ORDER — AZELASTINE HYDROCHLORIDE 137 UG/1
SPRAY, METERED NASAL
Qty: 90 ML | Refills: 2 | Status: SHIPPED | OUTPATIENT
Start: 2023-06-19

## 2023-07-25 ENCOUNTER — OFFICE VISIT (OUTPATIENT)
Dept: CARDIOLOGY | Facility: CLINIC | Age: 73
End: 2023-07-25
Payer: MEDICARE

## 2023-07-25 VITALS
SYSTOLIC BLOOD PRESSURE: 122 MMHG | DIASTOLIC BLOOD PRESSURE: 60 MMHG | OXYGEN SATURATION: 95 % | HEIGHT: 73 IN | HEART RATE: 63 BPM | WEIGHT: 225 LBS | BODY MASS INDEX: 29.82 KG/M2

## 2023-07-25 DIAGNOSIS — I25.10 CORONARY ARTERY DISEASE INVOLVING NATIVE CORONARY ARTERY OF NATIVE HEART WITHOUT ANGINA PECTORIS: Primary | ICD-10-CM

## 2023-07-25 DIAGNOSIS — I10 ESSENTIAL HYPERTENSION: ICD-10-CM

## 2023-07-25 DIAGNOSIS — E78.2 MIXED DYSLIPIDEMIA: ICD-10-CM

## 2023-07-25 PROCEDURE — 1159F MED LIST DOCD IN RCRD: CPT | Performed by: INTERNAL MEDICINE

## 2023-07-25 PROCEDURE — 1160F RVW MEDS BY RX/DR IN RCRD: CPT | Performed by: INTERNAL MEDICINE

## 2023-07-25 PROCEDURE — 99214 OFFICE O/P EST MOD 30 MIN: CPT | Performed by: INTERNAL MEDICINE

## 2023-07-25 PROCEDURE — 3078F DIAST BP <80 MM HG: CPT | Performed by: INTERNAL MEDICINE

## 2023-07-25 PROCEDURE — 3074F SYST BP LT 130 MM HG: CPT | Performed by: INTERNAL MEDICINE

## 2023-07-25 NOTE — PROGRESS NOTES
[facility] Cardiology    Encounter Date: 2023    Patient ID: José Miguel Jerry is a 73 y.o. male.  : 1950     PCP: Hernandez Jackman MD       Chief Complaint: Coronary Artery Disease      PROBLEM LIST:  Coronary artery disease  Echo, 2018: EF >60%. AV leaflets mildly thickened. Mild AR. No significant AS.   Cardiac cath 2018: Nonobstructive plaque disease involving multiple vessels without evidence of hemodynamically significant coronary artery disease.  Cardiolite stress test, 2021: EF 59%. No evidence of ischemia.   Echo, 2022: EF 59%. Mild AR. Trace MR.   48h Holter, 03/15/2022; SR. Rare APCs, and PVCs. No significant arrhythmias.   Hypertension   Mixed dyslipidemia   GERD  Polycythemia  Dysesthesia  Neuropathic pain  Erectile dysfunction after radial prostatectomy  10. Prostate cancer  11. Low testosterone    History of Present Illness  Patient presents today for a follow-up with a history of CAD and cardiac risk factors. Since last visit, patient has been doing well overall from a cardiovascular standpoint. He states that he tries to remain busy and active by working around the house. He was going to the gym with his spouse, but he has stopped recently because she pulled a muscle. Patient drinks one or two drinks per week and he has never smoked. He reports that he recently had lab work done with his PCP.  He is on HRT and his hemoglobin is typically high, and he donated blood last week. Patient denies chest pain, shortness of breath, orthopnea, palpitations, edema, dizziness, and syncope.     Allergies   Allergen Reactions    Chlorhexidine Rash    Clindamycin/Lincomycin Rash     itching    Neosporin [Bacitracin-Polymyxin B] Itching and Rash         Current Outpatient Medications:     amLODIPine-benazepril (LOTREL) 10-40 MG per capsule, Take 1 capsule by mouth Daily., Disp: , Rfl:     aspirin 81 MG EC tablet, Take 1 tablet by mouth Daily.,  "Disp: , Rfl:     Azelastine HCl 137 MCG/SPRAY solution, USE 1 SPRAY IN EACH NOSTRIL AS DIRECTED  TWICE A DAY AS NEEDED FOR RHINITIS OR ALLERGIES AS DIRECTED., Disp: 90 mL, Rfl: 2    dicyclomine (BENTYL) 10 MG capsule, , Disp: , Rfl:     esomeprazole (nexIUM) 20 MG capsule, As Needed., Disp: , Rfl:     hydrochlorothiazide (MICROZIDE) 12.5 MG capsule, Take 1 capsule by mouth Every Morning., Disp: , Rfl:     Lifitegrast (XIIDRA OP), Apply 1 drop to eye(s) as directed by provider 2 (Two) Times a Day., Disp: , Rfl:     metoprolol succinate XL (TOPROL-XL) 25 MG 24 hr tablet, Daily., Disp: , Rfl:     metroNIDAZOLE (METROGEL) 0.75 % gel, Apply 1 application topically to the appropriate area as directed Daily., Disp: , Rfl:     minocycline (MINOCIN,DYNACIN) 100 MG capsule, , Disp: , Rfl:     montelukast (SINGULAIR) 10 MG tablet, Take 1 tablet by mouth Every Night., Disp: 90 tablet, Rfl: 2    Multiple Vitamins-Minerals (MULTIVITAMIN ADULTS PO), Take 1 tablet by mouth Daily., Disp: , Rfl:     nortriptyline (PAMELOR) 10 MG capsule, Take 3 capsules by mouth Every Night., Disp: , Rfl:     simvastatin (ZOCOR) 20 MG tablet, Take 1 tablet by mouth Every Night., Disp: , Rfl:     venlafaxine XR (EFFEXOR-XR) 150 MG 24 hr capsule, , Disp: , Rfl:     The following portions of the patient's history were reviewed and updated as appropriate: allergies, current medications, past family history, past medical history, past social history, past surgical history and problem list.    ROS  Review of Systems   14 point ROS negative except for that listed in the HPI.         Objective:     /60 (BP Location: Left arm, Patient Position: Sitting)   Pulse 63   Ht 185.4 cm (73\")   Wt 102 kg (225 lb)   SpO2 95%   BMI 29.69 kg/m²      Physical Exam  Constitutional: Patient appears well-developed and well-nourished.   HENT: HEENT exam unremarkable.   Neck: Neck supple. No JVD present. No carotid bruits.   Cardiovascular: Normal rate, regular " rhythm and normal heart sounds. No murmur heard.   2+ symmetric pulses.   Pulmonary/Chest: Breath sounds normal. Does not exhibit tenderness.   Abdominal: Abdomen benign.   Musculoskeletal: Does not exhibit edema.   Neurological: Neurological exam unremarkable.   Vitals reviewed.    Data Review:     Lab date: 07/25/2023  FLP: , , HDL 35, LDL 69  CMP: Glu 89, BUN 18, Creat 1.05, Na 137, K 3.9, Cl 104, CO2 27, Ca 8.5, Alk Phos 80, AST 45, ALT 39  CBC: WBC 5.3, RBC 5.83, HGB 16.8, HCT 51, MCV 87.5, MCH 28.8,        Procedures             Assessment:      Diagnosis Plan   1. Coronary artery disease involving native coronary artery of native heart without angina pectoris  Stable without angina on current activity. Continue on aspirin for antiplatelet therapy.       2. Essential hypertension  Well controlled. Continue on amlodipine-benazepril, metoprolol, and hydrochlorothiazide.       3. Mixed dyslipidemia  Well controlled. Begin routine aerobic exercise for at least 30 minutes 5 days per week. Continue on simvastatin.         Plan:   Stable cardiac status. No current angina or CHF symptoms.  Begin routine aerobic exercise for at least 30 minutes 5 days per week.  Continue current medications.   FU in 12 MO, sooner as needed.  Thank you for allowing us to participate in the care of your patient.     Scribed for Anastacia Gary MD by Melvi Galvan. 7/25/2023 12:46 EDT    I, Anastacia Gary MD, personally performed the services described in this documentation as scribed by the above named individual in my presence, and it is both accurate and complete.  7/25/2023  17:35 EDT      Please note that portions of this note may have been completed with a voice recognition program. Efforts were made to edit the dictations, but occasionally words are mistranscribed.

## 2023-07-27 ENCOUNTER — LAB (OUTPATIENT)
Dept: LAB | Facility: HOSPITAL | Age: 73
End: 2023-07-27
Payer: MEDICARE

## 2023-07-27 DIAGNOSIS — E29.1 HYPOGONADISM IN MALE: ICD-10-CM

## 2023-07-27 LAB
HCT VFR BLD AUTO: 51.2 % (ref 37.5–51)
HGB BLD-MCNC: 17.4 G/DL (ref 13–17.7)

## 2023-07-27 PROCEDURE — 85018 HEMOGLOBIN: CPT

## 2023-07-27 PROCEDURE — 84153 ASSAY OF PSA TOTAL: CPT | Performed by: UROLOGY

## 2023-07-27 PROCEDURE — 84403 ASSAY OF TOTAL TESTOSTERONE: CPT

## 2023-07-27 PROCEDURE — 84402 ASSAY OF FREE TESTOSTERONE: CPT

## 2023-07-27 PROCEDURE — 36415 COLL VENOUS BLD VENIPUNCTURE: CPT

## 2023-07-27 PROCEDURE — 85014 HEMATOCRIT: CPT

## 2023-07-27 PROCEDURE — 84270 ASSAY OF SEX HORMONE GLOBUL: CPT

## 2023-08-03 LAB
SHBG SERPL-SCNC: 15.5 NMOL/L (ref 19.3–76.4)
TESTOST FREE SERPL-MCNC: 5.3 PG/ML (ref 6.6–18.1)
TESTOST SERPL-MCNC: 293 NG/DL (ref 264–916)

## 2023-09-19 ENCOUNTER — TELEPHONE (OUTPATIENT)
Dept: PULMONOLOGY | Facility: CLINIC | Age: 73
End: 2023-09-19

## 2023-09-19 NOTE — TELEPHONE ENCOUNTER
Caller: José Miguel Jerry    Relationship: Self    Best call back number: [phone]    Equipment requested: MASK, HEAD GEAR, TUBING AND FILTERS    Reason for the request: PT IS NEEDING NEW SUPPLIES.     Prescribing Provider: MADELEINE CABRAL    Additional information or concerns: PT NEEDS SUPPLIES FOR HIS BI-PAP MACHINE. [facility] IS REQUESTING INFORMATION WHICH IS NEEDED FROM THE PRACTICE. THEY NEED RECORDS PROVING THAT HE IS SEEING DR. CABRAL AND CLINICAL NOTES. [facility] FAX# [phone]

## 2023-09-21 ENCOUNTER — PROCEDURE VISIT (OUTPATIENT)
Dept: UROLOGY | Facility: CLINIC | Age: 73
End: 2023-09-21
Payer: MEDICARE

## 2023-09-21 DIAGNOSIS — E29.1 HYPOGONADISM IN MALE: Primary | ICD-10-CM

## 2023-09-21 NOTE — PROGRESS NOTES
OFFICE PROCEDURE NOTE      PREPROCEDURE DIAGNOSIS:  Hypogonadism.      POSTPROCEDURE DIAGNOSIS:  Hypogonadism.      PROCEDURE:  Testopel (NDC# 77947-062-58) insertion/implant in RIGHT flank, 11 pellets implanted.      SURGEON:    Nick Sommer MD    ANESTHESIA:  Local.     Lab Results   Component Value Date    HCT 51.2 (H) 07/27/2023     Lab Results   Component Value Date    TESTOSTERONE 348.00 06/06/2022        Lab Results   Component Value Date    PSA <0.014 07/27/2023    PSA <0.014 03/27/2023    PSA <0.014 01/14/2021        The risks/benefits of the procedure were discussed with the patient.     DESCRIPTION OF PROCEDURE: The patient was placed in the lateral decubitus position and his skin was prepped with chlorhexadine solution. 10 ml of 1% lidocaine w/epinephrine were instilled subcutaneously in a straight fashion. A 3mm skin puncture was made with an 11 blade. The trochar was placed subcutaneously along the line with ease and without patient discomfort. The sharp stylet was removed and the pellets were placed along the track and positioned using the blunt stylet. Following this,  a steri strip was used to close the puncture wound. A 4/4 gauze pad was placed over the wound and a Tegaderm bandage was applied and the patient was repositioned on his opposite side for compression purposes with a roll.      PLAN:  The patient was discharged in stable condition to be followed up with serial serum testosterone levels. Follow up for next visit in 3 months.    Restart phlebotomy every 3 months

## 2023-10-19 ENCOUNTER — TELEPHONE (OUTPATIENT)
Dept: PULMONOLOGY | Facility: CLINIC | Age: 73
End: 2023-10-19

## 2023-10-19 NOTE — TELEPHONE ENCOUNTER
Caller: José Miguel Jerry    Relationship: Self    Best call back number: [phone] OKAY TO LEAVE VOICEMAIL    Equipment requested: BIPAP SUPPLIES - MASK, HOSE, HEAD GEAR, FILTERS      Reason for the request: UPDATING EQUIPMENT- [employer] WILL NOT SEND WITHOUT ORDER    Prescribing Provider:      Additional information or concerns: PATIENT IS TRAVELING IN A WEEK AND A HALF AND WOULD LIKE TO GET EQUIPMENT BEFORE THEN IF POSSIBLE.

## 2023-10-20 NOTE — TELEPHONE ENCOUNTER
Patient notifed current Rx is still good. If DME needs something signed they know the process for this.

## 2023-10-23 ENCOUNTER — TELEPHONE (OUTPATIENT)
Dept: PULMONOLOGY | Facility: CLINIC | Age: 73
End: 2023-10-23

## 2023-10-23 DIAGNOSIS — G47.33 OSA (OBSTRUCTIVE SLEEP APNEA): Primary | ICD-10-CM

## 2023-10-23 NOTE — TELEPHONE ENCOUNTER
Caller: ELEANOR MONTERO    Relationship to patient: SELF    Best call back number: [phone]    Patient is needing: [facility]S [facility][phone]

## 2023-10-26 ENCOUNTER — TELEPHONE (OUTPATIENT)
Dept: UROLOGY | Facility: CLINIC | Age: 73
End: 2023-10-26
Payer: MEDICARE

## 2023-10-26 NOTE — TELEPHONE ENCOUNTER
Hub staff attempted to follow warm transfer process and was unsuccessful     Caller: José Miguel Jerry    Relationship to patient: Self    Best call back number: [phone]    Patient is needing: PT IS AT [facility] FOR HIS LAB WORK AND THERE IS NO ORDER. PLEASE FAX ORDER ASAP. FAX#[phone]. THANK YOU

## 2023-10-26 NOTE — TELEPHONE ENCOUNTER
PT CALLED TO CHECK ON STATUS OF LABS.  PT NEEDS TO HAVE COMPLETED BY TODAY AT 2PM. THE LAB IS CLOSING AT THIS TIME.  PT WILL BE OUT OF TOWN FOR 2 WEEKS.

## 2024-01-18 ENCOUNTER — TELEPHONE (OUTPATIENT)
Dept: UROLOGY | Facility: CLINIC | Age: 74
End: 2024-01-18

## 2024-01-22 ENCOUNTER — PROCEDURE VISIT (OUTPATIENT)
Dept: UROLOGY | Facility: CLINIC | Age: 74
End: 2024-01-22
Payer: MEDICARE

## 2024-01-22 ENCOUNTER — LAB (OUTPATIENT)
Dept: LAB | Facility: HOSPITAL | Age: 74
End: 2024-01-22
Payer: MEDICARE

## 2024-01-22 DIAGNOSIS — D75.1 POLYCYTHEMIA: ICD-10-CM

## 2024-01-22 DIAGNOSIS — E29.1 HYPOGONADISM IN MALE: Primary | ICD-10-CM

## 2024-01-22 DIAGNOSIS — C61 PROSTATE CANCER: ICD-10-CM

## 2024-01-22 DIAGNOSIS — E29.1 HYPOGONADISM IN MALE: ICD-10-CM

## 2024-01-22 LAB — HCT VFR BLD AUTO: 53 % (ref 37.5–51)

## 2024-01-22 PROCEDURE — 84403 ASSAY OF TOTAL TESTOSTERONE: CPT

## 2024-01-22 PROCEDURE — 11980 IMPLANT HORMONE PELLET(S): CPT | Performed by: UROLOGY

## 2024-01-22 PROCEDURE — 85014 HEMATOCRIT: CPT | Performed by: UROLOGY

## 2024-01-22 PROCEDURE — 36415 COLL VENOUS BLD VENIPUNCTURE: CPT

## 2024-01-22 PROCEDURE — 85018 HEMOGLOBIN: CPT | Performed by: UROLOGY

## 2024-01-22 PROCEDURE — 84402 ASSAY OF FREE TESTOSTERONE: CPT

## 2024-01-22 RX ORDER — SODIUM CHLORIDE 9 MG/ML
250 INJECTION, SOLUTION INTRAVENOUS ONCE
OUTPATIENT
Start: 2024-01-22

## 2024-01-22 RX ORDER — SODIUM CHLORIDE 9 MG/ML
250 INJECTION, SOLUTION INTRAVENOUS ONCE
Status: CANCELLED | OUTPATIENT
Start: 2024-01-22

## 2024-01-22 NOTE — PROGRESS NOTES
OFFICE PROCEDURE NOTE      PREPROCEDURE DIAGNOSIS:  Hypogonadism.      POSTPROCEDURE DIAGNOSIS:  Hypogonadism.      PROCEDURE:  Testopel (NDC# 27029-551-31) insertion/implant in  flank, 11 pellets implanted.      SURGEON:    Nick Sommer MD    ANESTHESIA:  Local.     Lab Results   Component Value Date    HCT 51.2 (H) 07/27/2023     Lab Results   Component Value Date    TESTOSTERONE 348.00 06/06/2022        Lab Results   Component Value Date    PSA <0.014 07/27/2023    PSA <0.014 03/27/2023    PSA <0.014 01/14/2021     He had his labs performed at an outside institution.  His testosterone is within normal limits.  We do not have an updated hematocrit or PSA.      The risks/benefits of the procedure were discussed with the patient.     DESCRIPTION OF PROCEDURE: The patient was placed in the lateral decubitus position and his skin was prepped with chlorhexadine solution. 10 ml of 1% lidocaine w/epinephrine were instilled subcutaneously in a straight fashion. A 3mm skin puncture was made with an 11 blade. The trochar was placed subcutaneously along the line with ease and without patient discomfort. The sharp stylet was removed and the pellets were placed along the track and positioned using the blunt stylet. Following this,  a steri strip was used to close the puncture wound. A 4/4 gauze pad was placed over the wound and a Tegaderm bandage was applied and the patient was repositioned on his opposite side for compression purposes with a roll.      PLAN:  The patient was discharged in stable condition to be followed up with serial serum testosterone levels. Follow up for next visit in 3 months.  Get labs done today.

## 2024-01-30 LAB
TESTOST FREE SERPL-MCNC: 11.9 PG/ML (ref 6.6–18.1)
TESTOST SERPL-MCNC: 416 NG/DL (ref 264–916)

## 2024-03-05 ENCOUNTER — OFFICE VISIT (OUTPATIENT)
Dept: PULMONOLOGY | Facility: CLINIC | Age: 74
End: 2024-03-05
Payer: MEDICARE

## 2024-03-05 VITALS
BODY MASS INDEX: 30.48 KG/M2 | DIASTOLIC BLOOD PRESSURE: 74 MMHG | RESPIRATION RATE: 14 BRPM | OXYGEN SATURATION: 95 % | WEIGHT: 230 LBS | HEART RATE: 83 BPM | HEIGHT: 73 IN | SYSTOLIC BLOOD PRESSURE: 138 MMHG

## 2024-03-05 DIAGNOSIS — J30.89 OTHER ALLERGIC RHINITIS: ICD-10-CM

## 2024-03-05 DIAGNOSIS — E66.9 OBESITY (BMI 30-39.9): ICD-10-CM

## 2024-03-05 DIAGNOSIS — G47.33 OSA (OBSTRUCTIVE SLEEP APNEA): Primary | ICD-10-CM

## 2024-03-05 PROCEDURE — 99214 OFFICE O/P EST MOD 30 MIN: CPT | Performed by: INTERNAL MEDICINE

## 2024-03-05 PROCEDURE — 3078F DIAST BP <80 MM HG: CPT | Performed by: INTERNAL MEDICINE

## 2024-03-05 PROCEDURE — 3075F SYST BP GE 130 - 139MM HG: CPT | Performed by: INTERNAL MEDICINE

## 2024-03-05 NOTE — PROGRESS NOTES
"  Chief Complaint   Patient presents with    Sleeping Problem    Follow-up         Subjective   José Miguel Jerry is a 73 y.o. male.   Patient was evaluated today for follow up of Sleep apnea. Patient does report initial improvement but now feels that the PAP device is not relieving some of the symptoms.    Patient is not aware of snoring through the device.     Patient is also having occasional leaks with the mask which makes it hard for him to use the mask on a consistent basis.     Patient says that the compliance with the use of the equipment is good.     Patient says that he has been using his nasal sprays on a seasonal basis and describes no significant ongoing issues other than occasional congestion.     The following portions of the patient's history were reviewed and updated as appropriate: allergies, current medications, past family history, past medical history, past social history, and past surgical history.    Review of Systems   HENT:  Negative for sinus pressure, sneezing and sore throat.    Respiratory:  Negative for cough, chest tightness, shortness of breath and wheezing.        Objective   Visit Vitals  /74   Pulse 83   Resp 14   Ht 185.4 cm (73\") Comment: pt reported   Wt 104 kg (230 lb)   SpO2 95%   BMI 30.34 kg/m²         BMI Readings from Last 3 Encounters:   03/05/24 30.34 kg/m²   07/25/23 29.69 kg/m²   03/07/23 29.40 kg/m²       Physical Exam  Vitals reviewed.   Constitutional:       Appearance: He is well-developed.   HENT:      Head: Atraumatic.      Mouth/Throat:      Comments: Oropharynx was crowded.  Musculoskeletal:      Comments: Gait was normal.   Neurological:      Mental Status: He is alert and oriented to person, place, and time.           Assessment & Plan   Diagnoses and all orders for this visit:    1. TULIO (obstructive sleep apnea) (Primary)  -     BIPAP / CPAP Adjustment    2. Other allergic rhinitis    3. Obesity (BMI 30-39.9)           Return in about 7 months (around " 9/30/2024) for SleepONLY/Shyla, ....Also 17 mths w/ Dr. Benz.    DISCUSSION (if any):  Titration study performed in March 2022    Latest PAP device provided in March 2022  [employer] company: [employer]    Current PAP settings: 14/6  Current mask type: FFM    I told the patient that his symptoms are consistent with partially controlled sleep apnea.    It appears that some of his issues are secondary to the mask.  The patient was provided with Dream wear full face mask prescription. We will ask the [employer] company to provide him with the same mask to use at home.     The patient appears to have issues with getting used to the mask at night.  I recommended that he uses the mask for 15-30 minutes every day, to get used to it.    Patient was advised to continue using PAP for at least 4 hours every night.    Patient was advised to call this office with any issues.    Patient was advised to continue his nasal spray on a seasonal basis, since his symptoms are consistent with seasonal allergic rhinitis.    Vaccination status addressed.    Up-to-date with influenza vaccinations.     Up-to-date with pneumonia vaccinations.     It was recommended that the patient consider Prevnar-20 vaccination and discuss it with his PCP, if not already obtained.       Dictated utilizing Dragon dictation.    This document was electronically signed by Salome Benz MD on 03/05/24 at 10:10 EST

## 2024-04-22 ENCOUNTER — PROCEDURE VISIT (OUTPATIENT)
Dept: UROLOGY | Facility: CLINIC | Age: 74
End: 2024-04-22
Payer: MEDICARE

## 2024-04-22 ENCOUNTER — LAB (OUTPATIENT)
Dept: LAB | Facility: HOSPITAL | Age: 74
End: 2024-04-22
Payer: MEDICARE

## 2024-04-22 VITALS
WEIGHT: 230 LBS | TEMPERATURE: 97.2 F | BODY MASS INDEX: 30.48 KG/M2 | HEIGHT: 73 IN | DIASTOLIC BLOOD PRESSURE: 70 MMHG | SYSTOLIC BLOOD PRESSURE: 132 MMHG

## 2024-04-22 DIAGNOSIS — D75.1 POLYCYTHEMIA: Primary | ICD-10-CM

## 2024-04-22 DIAGNOSIS — D75.1 POLYCYTHEMIA: ICD-10-CM

## 2024-04-22 DIAGNOSIS — C61 PROSTATE CANCER: ICD-10-CM

## 2024-04-22 DIAGNOSIS — E29.1 HYPOGONADISM IN MALE: ICD-10-CM

## 2024-04-22 PROCEDURE — 85018 HEMOGLOBIN: CPT | Performed by: UROLOGY

## 2024-04-22 PROCEDURE — 84402 ASSAY OF FREE TESTOSTERONE: CPT | Performed by: UROLOGY

## 2024-04-22 PROCEDURE — 11980 IMPLANT HORMONE PELLET(S): CPT | Performed by: UROLOGY

## 2024-04-22 PROCEDURE — 84403 ASSAY OF TOTAL TESTOSTERONE: CPT | Performed by: UROLOGY

## 2024-04-22 PROCEDURE — 85014 HEMATOCRIT: CPT | Performed by: UROLOGY

## 2024-04-22 NOTE — PROGRESS NOTES
OFFICE PROCEDURE NOTE      PREPROCEDURE DIAGNOSIS:  Hypogonadism.      POSTPROCEDURE DIAGNOSIS:  Hypogonadism.      PROCEDURE:  Testopel (NDC# 45208-378-95) insertion/implant in  flank, 11 pellets implanted.      SURGEON:    Nick Sommer MD    ANESTHESIA:  Local.     Lab Results   Component Value Date    HCT 52.4 (H) 01/22/2024    HCT 53.0 (H) 01/22/2024     Lab Results   Component Value Date    TESTOSTERONE 348.00 06/06/2022      Testosterone, Total  264 - 916 ng/dL 416 293  Low   Low  .00 R 375  CM   Comment: Adult male reference interval is based on a population of  healthy nonobese males (BMI <30) between 19 and 39 years old.  Annie, et.al. JCEM 2017,102;7000-2896. PMID: 32113371.   Testosterone, Free              Lab Results   Component Value Date    PSA <0.014 07/27/2023    PSA <0.014 03/27/2023    PSA <0.014 01/14/2021     He had his labs performed at an outside institution.  His testosterone is within normal limits.  We do not have an updated hematocrit or PSA.      The risks/benefits of the procedure were discussed with the patient.     DESCRIPTION OF PROCEDURE: The patient was placed in the lateral decubitus position and his skin was prepped with chlorhexadine solution. 10 ml of 1% lidocaine w/epinephrine were instilled subcutaneously in a straight fashion. A 3mm skin puncture was made with an 11 blade. The trochar was placed subcutaneously along the line with ease and without patient discomfort. The sharp stylet was removed and the pellets were placed along the track and positioned using the blunt stylet. Following this,  a steri strip was used to close the puncture wound. A 4/4 gauze pad was placed over the wound and a Tegaderm bandage was applied and the patient was repositioned on his opposite side for compression purposes with a roll.      PLAN:  The patient was discharged in stable condition to be followed up with serial serum testosterone levels. Follow up for next visit  in 3 months.  Get HCT done today, since he had his last hematocrit drawn prior to having blood taken off.

## 2024-07-05 ENCOUNTER — LAB (OUTPATIENT)
Dept: LAB | Facility: HOSPITAL | Age: 74
End: 2024-07-05
Payer: MEDICARE

## 2024-07-05 ENCOUNTER — OFFICE VISIT (OUTPATIENT)
Dept: UROLOGY | Facility: CLINIC | Age: 74
End: 2024-07-05
Payer: MEDICARE

## 2024-07-05 VITALS
SYSTOLIC BLOOD PRESSURE: 138 MMHG | DIASTOLIC BLOOD PRESSURE: 62 MMHG | BODY MASS INDEX: 29.82 KG/M2 | TEMPERATURE: 98.2 F | HEIGHT: 73 IN | OXYGEN SATURATION: 95 % | HEART RATE: 80 BPM | WEIGHT: 225 LBS

## 2024-07-05 DIAGNOSIS — R35.1 NOCTURIA: ICD-10-CM

## 2024-07-05 DIAGNOSIS — R35.1 NOCTURIA: Primary | ICD-10-CM

## 2024-07-05 DIAGNOSIS — E29.1 HYPOGONADISM IN MALE: ICD-10-CM

## 2024-07-05 DIAGNOSIS — C61 PROSTATE CANCER: ICD-10-CM

## 2024-07-05 DIAGNOSIS — N39.41 URGE INCONTINENCE OF URINE: ICD-10-CM

## 2024-07-05 LAB
BILIRUB BLD-MCNC: NEGATIVE MG/DL
CLARITY, POC: CLEAR
COLOR UR: YELLOW
EXPIRATION DATE: NORMAL
GLUCOSE UR STRIP-MCNC: NEGATIVE MG/DL
HCT VFR BLD AUTO: 55 % (ref 37.5–51)
HGB BLD-MCNC: 18 G/DL (ref 13–17.7)
KETONES UR QL: NEGATIVE
LEUKOCYTE EST, POC: NEGATIVE
Lab: NORMAL
NITRITE UR-MCNC: NEGATIVE MG/ML
PH UR: 7 [PH] (ref 5–8)
PROT UR STRIP-MCNC: NEGATIVE MG/DL
PSA SERPL-MCNC: <0.014 NG/ML (ref 0–4)
RBC # UR STRIP: NEGATIVE /UL
SP GR UR: 1.01 (ref 1–1.03)
TESTOST SERPL-MCNC: 507 NG/DL (ref 193–740)
UROBILINOGEN UR QL: NORMAL

## 2024-07-05 PROCEDURE — 85014 HEMATOCRIT: CPT

## 2024-07-05 PROCEDURE — 36415 COLL VENOUS BLD VENIPUNCTURE: CPT

## 2024-07-05 PROCEDURE — 84588 ASSAY OF VASOPRESSIN: CPT

## 2024-07-05 PROCEDURE — 84403 ASSAY OF TOTAL TESTOSTERONE: CPT

## 2024-07-05 PROCEDURE — 85018 HEMOGLOBIN: CPT

## 2024-07-05 PROCEDURE — 84153 ASSAY OF PSA TOTAL: CPT

## 2024-07-05 RX ORDER — MIRABEGRON 50 MG/1
50 TABLET, EXTENDED RELEASE ORAL DAILY
COMMUNITY
Start: 2024-07-02

## 2024-07-05 RX ORDER — TOLTERODINE 4 MG/1
1 CAPSULE, EXTENDED RELEASE ORAL DAILY
COMMUNITY
Start: 2024-06-10

## 2024-07-05 RX ORDER — VALACYCLOVIR HYDROCHLORIDE 1 G/1
1000 TABLET, FILM COATED ORAL DAILY
COMMUNITY
Start: 2024-03-08 | End: 2024-07-05

## 2024-07-05 NOTE — PROGRESS NOTES
Office Visit Males LUTS      Patient Name: José Miguel Jerry  : 1950   MRN: [id number]     Chief Complaint:   Chief Complaint   Patient presents with    Follow-up    Urinary Incontinence       Referring Provider: Hernandez Jackman,*    History of Present Illness: José Miguel Jerry is a 74 y.o. male who presents today with history of Prostate Ca with prostatectomy in  who presents with LUTS.  Primary symptom includes: KEITH and new nocturia and urge incontinence, patient reports leaking is worse at nighttime he is having nocturia 4 times nightly and stops drinking fluids about 2 hours prior to bedtime, he has had no changes in medication or diet.  He does report since his prostatectomy he has had mild stress incontinence which is always been minor.  Patient denies straining  Onset was in the last 6 weeks.  Previous treatments include: Detrol LA, Myrbetriq 25 mg and Myrbetriq 50 mg    IPSS Questionnaire (AUA-7):  Incomplete emptying  Over the past month, how often have you had a sensation of not emptying your bladder completely after you finished urinating?: Less than 1 time in 5 (24)  Frequency  Over the past month, how often have you had to urinate again less than two hours after you finishied urinating ?: About half the time (24)  Intermittency  Over the past month, how often have you found you stopped and started again several time when you urinated ?: Less than 1 time in 5 (24)  Urgency  Over the last month, how often have you found it difficult  have you found it difficult to postpone urination ?: About half the time (24)  Weak Stream  Over the past month, how often have you had a weak urinary stream ?: Less than 1 time in 5 (24)  Straining  Over the past month, how often have you had to push or strain to begin urination ?: Not at all (24)  Nocturia  Over the past month, how many times did you most typically get up to urinate  from the time you went to bed until the time you got up in the morning ?: 1 time (07/05/24 0933)  Quality of life due to urinary symptoms  If you were to spend the rest of your life with your urinary condition the way it is now, how would feel about that?: Terrible (07/05/24 0933)    Scores  Total IPSS Score: (!) 10 (07/05/24 0933)  Total Score = Symptomatic Level: Moderately symptomatic: 8-19 (07/05/24 0933)       Subjective      Review of System:   As noted in HPI    Past Medical History:   Past Medical History:   Diagnosis Date    Abnormal stress test 08/14/2018    1. MPS 8-8-18: Positive EKG changes Reversible Inferior ischemia EF 60%      Depression     Dyspnea on exertion     Elevated cholesterol     Erectile dysfunction     History of echocardiogram 08/2018    History of exercise stress test 08/2018    History of pneumonia 03/21/2019    Reports 40+ years ago    Hypertension     Prostate cancer 2012    Seasonal allergies     reports mild    Sleep apnea     Uses CPAP HS - instructed to bring in DOS    Wears glasses        Past Surgical History:   Past Surgical History:   Procedure Laterality Date    APPENDECTOMY      CARDIAC CATHETERIZATION N/A 08/20/2018    Procedure: Left Heart Cath;  Surgeon: Anastacia Gary MD;  Location:  [facility];  Service: Cardiology    CARDIOVASCULAR STRESS TEST  12/14/2021    CATARACT EXTRACTION, BILATERAL      CHOLECYSTECTOMY N/A 12/29/2021    Procedure: CHOLECYSTECTOMY LAPAROSCOPIC;  Surgeon: Caleb Edmond MD;  Location:  [facility];  Service: General;  Laterality: N/A;    COLONOSCOPY      GALLBLADDER SURGERY  2021    HEAD/NECK LESION/CYST EXCISION Right 10/31/2016    Procedure: EXCISE BCCA RIGHT SCALP;  Surgeon: Lowell Rider MD;  Location:  [facility];  Service:     NASAL SEPTUM SURGERY      PENILE PROSTHESIS IMPLANT N/A 11/28/2018    Procedure: PENILE PROSTHESIS PLACEMENT;  Surgeon: Nick Sommer MD;  Location:  [facility];  Service: Urology    PROSTATE  SURGERY      SKIN BIOPSY      patient reports basal cell carcinoma removed from head x3 places    TEETH EXTRACTION         Family History:   Family History   Problem Relation Age of Onset    Hypertension Mother     Heart disease Mother         A fib    Kidney disease Father     Liver disease Father     Heart disease Father     Hypertension Father     Heart disease Maternal Grandfather         Enlarged heart    Hypertension Maternal Grandfather     Heart disease Paternal Grandfather     Hypertension Paternal Grandfather         Hypertension    Heart disease Daughter        Social History:   Social History     Socioeconomic History    Marital status:    Tobacco Use    Smoking status: Never     Passive exposure: Never    Smokeless tobacco: Never   Vaping Use    Vaping status: Never Used   Substance and Sexual Activity    Alcohol use: Yes     Alcohol/week: 2.0 standard drinks of alcohol     Types: 1 Glasses of wine, 1 Cans of beer per week     Comment: Social use, reports no HX of abuse    Drug use: Never    Sexual activity: Yes     Partners: Female     Birth control/protection: None       Medications:     Current Outpatient Medications:     amLODIPine-benazepril (LOTREL) 10-40 MG per capsule, Take 1 capsule by mouth Daily., Disp: , Rfl:     aspirin 81 MG EC tablet, Take 1 tablet by mouth Daily., Disp: , Rfl:     Azelastine HCl 137 MCG/SPRAY solution, USE 1 SPRAY IN EACH NOSTRIL AS DIRECTED  TWICE A DAY AS NEEDED FOR RHINITIS OR ALLERGIES AS DIRECTED., Disp: 90 mL, Rfl: 2    esomeprazole (nexIUM) 20 MG capsule, As Needed., Disp: , Rfl:     hydrochlorothiazide (MICROZIDE) 12.5 MG capsule, Take 1 capsule by mouth Every Morning., Disp: , Rfl:     Lifitegrast (XIIDRA OP), Apply 1 drop to eye(s) as directed by provider 2 (Two) Times a Day., Disp: , Rfl:     metoprolol succinate XL (TOPROL-XL) 25 MG 24 hr tablet, Daily., Disp: , Rfl:     metroNIDAZOLE (METROGEL) 0.75 % gel, Apply 1 application topically to the  "appropriate area as directed Daily., Disp: , Rfl:     Mirabegron ER (MYRBETRIQ) 50 MG tablet sustained-release 24 hour 24 hr tablet, Take 50 mg by mouth Daily., Disp: , Rfl:     montelukast (SINGULAIR) 10 MG tablet, Take 1 tablet by mouth Every Night., Disp: 90 tablet, Rfl: 2    Multiple Vitamins-Minerals (MULTIVITAMIN ADULTS PO), Take 1 tablet by mouth Daily., Disp: , Rfl:     mupirocin (BACTROBAN) 2 % ointment, Apply 1 Application topically to the appropriate area as directed 2 (Two) Times a Day. Apply topically to the affected area twice daily, Disp: , Rfl:     nortriptyline (PAMELOR) 10 MG capsule, Take 3 capsules by mouth Every Night., Disp: , Rfl:     simvastatin (ZOCOR) 20 MG tablet, Take 1 tablet by mouth Every Night., Disp: , Rfl:     tolterodine LA (DETROL LA) 4 MG 24 hr capsule, Take 1 capsule by mouth Daily., Disp: , Rfl:     valACYclovir (VALTREX) 1000 MG tablet, Take 1 tablet by mouth Daily., Disp: , Rfl:     venlafaxine XR (EFFEXOR-XR) 150 MG 24 hr capsule, , Disp: , Rfl:     dicyclomine (BENTYL) 10 MG capsule, , Disp: , Rfl:     Allergies:   Allergies   Allergen Reactions    Chlorhexidine Rash    Clindamycin/Lincomycin Rash     itching    Neosporin [Bacitracin-Polymyxin B] Itching and Rash       Objective     Physical Exam:   Vital Signs:   Vitals:    07/05/24 0943   BP: 138/62   Pulse: 80   Temp: 98.2 °F (36.8 °C)   SpO2: 95%   Weight: 102 kg (225 lb)   Height: 185.4 cm (72.99\")     Body mass index is 29.69 kg/m².     Physical Exam  Vitals and nursing note reviewed.   Constitutional:       General: He is not in acute distress.     Appearance: Normal appearance. He is normal weight. He is not ill-appearing.   Pulmonary:      Effort: Pulmonary effort is normal. No respiratory distress.   Skin:     General: Skin is warm and dry.   Neurological:      General: No focal deficit present.      Mental Status: He is alert and oriented to person, place, and time.   Psychiatric:         Mood and Affect: Mood " normal.         Behavior: Behavior normal.          Labs  Lab Results   Component Value Date    PSA <0.014 07/27/2023    PSA <0.014 03/27/2023    PSA <0.014 01/14/2021       Brief Urine Lab Results  (Last result in the past 365 days)        Color   Clarity   Blood   Leuk Est   Nitrite   Protein   CREAT   Urine HCG        07/05/24 1047 Yellow   Clear   Negative   Negative   Negative   Negative                   PVR  Post-void residual performed by staff - 0ml      Assessment / Plan      Assessment  Mr. Jerry is a 74 y.o. male with a history of prostate cancer and prostatectomy in 2013 who presents with LUTS, primarily nocturia and urge. incontinence.  Patient has trialed Detrol LA, Myrbetriq 25 mg and currently is taking Myrbetriq 50 mg x 1 week with minimal to no improvements in urinary symptoms.  We discussed with his history of prostatectomy I would recommend urodynamics and cystoscopy to evaluate for any concerns of urethral strictures from previous prostate surgeries.  We also discussed treatment options for incontinence such as AUS if his incontinence is primarily stress incontinence, or bladder Botox or InterStim for primarily urge incontinence.    Plan  1.  Schedule UDS and follow-up with cystoscopy with Dr. Sommer    Follow Up:   Return for Urodynamics and follow up cysto with Dr. Sommer.    ERENDIRA Calderon, NP-C  [facility] [place]

## 2024-07-09 ENCOUNTER — TELEPHONE (OUTPATIENT)
Dept: UROLOGY | Facility: CLINIC | Age: 74
End: 2024-07-09
Payer: MEDICARE

## 2024-07-09 NOTE — TELEPHONE ENCOUNTER
Per patient's request, orders for a therapeutic phlebotomy were mail to him.  He is have it done at the [facility].

## 2024-07-09 NOTE — TELEPHONE ENCOUNTER
----- Message from Nick Sommer sent at 7/7/2024  8:43 AM EDT -----  Patient needs to get phlebotomy done as soon as possible

## 2024-07-12 LAB — VASOPRESSIN SERPL-MCNC: 3 PG/ML (ref 0–4.7)

## 2024-07-16 ENCOUNTER — TELEPHONE (OUTPATIENT)
Dept: UROLOGY | Facility: CLINIC | Age: 74
End: 2024-07-16

## 2024-07-16 NOTE — TELEPHONE ENCOUNTER
INCOMING CALL FROM PT STATING HE IS AT THE LAB AND NEITHER THE LAB OR PATIENT KNOWS WHAT HE IS NEEDING DONE. HUB ATTEMPTED TO WARM TRANSFER.     PATIENT IS REQUESTING A CALL BACK ASAP BEFORE HE LEAVES THE LAB.     José Miguel Jerry   [phone]

## 2024-07-22 ENCOUNTER — PROCEDURE VISIT (OUTPATIENT)
Dept: UROLOGY | Facility: CLINIC | Age: 74
End: 2024-07-22
Payer: MEDICARE

## 2024-07-22 ENCOUNTER — LAB (OUTPATIENT)
Dept: LAB | Facility: HOSPITAL | Age: 74
End: 2024-07-22
Payer: MEDICARE

## 2024-07-22 DIAGNOSIS — E29.1 HYPOGONADISM IN MALE: ICD-10-CM

## 2024-07-22 DIAGNOSIS — D75.1 POLYCYTHEMIA: ICD-10-CM

## 2024-07-22 DIAGNOSIS — E29.1 HYPOGONADISM IN MALE: Primary | ICD-10-CM

## 2024-07-22 LAB
HCT VFR BLD AUTO: 51.8 % (ref 37.5–51)
HGB BLD-MCNC: 16.9 G/DL (ref 13–17.7)
TESTOST SERPL-MCNC: 409 NG/DL (ref 193–740)

## 2024-07-22 PROCEDURE — 11980 IMPLANT HORMONE PELLET(S): CPT | Performed by: UROLOGY

## 2024-07-22 PROCEDURE — 85014 HEMATOCRIT: CPT

## 2024-07-22 PROCEDURE — 36415 COLL VENOUS BLD VENIPUNCTURE: CPT

## 2024-07-22 PROCEDURE — 85018 HEMOGLOBIN: CPT

## 2024-07-22 PROCEDURE — 84403 ASSAY OF TOTAL TESTOSTERONE: CPT

## 2024-07-22 RX ORDER — AZELASTINE HYDROCHLORIDE 137 UG/1
SPRAY, METERED NASAL
Qty: 90 ML | Refills: 2 | Status: SHIPPED | OUTPATIENT
Start: 2024-07-22

## 2024-07-22 NOTE — PROGRESS NOTES
OFFICE PROCEDURE NOTE      PREPROCEDURE DIAGNOSIS:  Hypogonadism.      POSTPROCEDURE DIAGNOSIS:  Hypogonadism.      PROCEDURE:  Testopel (NDC# 86434-224-82) insertion/implant in  flank, 11 pellets implanted.      SURGEON:    Nick Sommer MD    ANESTHESIA:  Local.     Lab Results   Component Value Date    HCT 55.0 (H) 07/05/2024     Lab Results   Component Value Date    TESTOSTERONE 507.00 07/05/2024        Lab Results   Component Value Date    PSA <0.014 07/05/2024    PSA <0.014 07/27/2023    PSA <0.014 03/27/2023     He had his labs performed at an outside institution.  His testosterone is within normal limits.  We do not have an updated hematocrit or PSA.      The risks/benefits of the procedure were discussed with the patient.     DESCRIPTION OF PROCEDURE: The patient was placed in the lateral decubitus position and his skin was prepped with chlorhexadine solution. 10 ml of 1% lidocaine w/epinephrine were instilled subcutaneously in a straight fashion. A 3mm skin puncture was made with an 11 blade. The trochar was placed subcutaneously along the line with ease and without patient discomfort. The sharp stylet was removed and the pellets were placed along the track and positioned using the blunt stylet. Following this,  a steri strip was used to close the puncture wound. A 4/4 gauze pad was placed over the wound and a Tegaderm bandage was applied and the patient was repositioned on his opposite side for compression purposes with a roll.      PLAN:  The patient was discharged in stable condition to be followed up with serial serum testosterone levels. Follow up for next visit in 3 months.  I instructed the patient to get hematocrit today to make sure that the phlebotomy was effective.    He also has urge incontinence which has been worsening, is on 2 different medications that are not effective.  I gave him brochures about Botox and InterStim.  We will discuss these at next visit.

## 2024-07-22 NOTE — PROGRESS NOTES
Please tell patient his hematocrit is down to 51.8, however that is high still and he needs to repeat phlebotomy in 4 weeks from when he last did.

## 2024-08-12 PROBLEM — E78.5 DYSLIPIDEMIA: Status: ACTIVE | Noted: 2018-08-20

## 2024-08-12 NOTE — PROGRESS NOTES
[facility]    Encounter Date: 2024    Patient ID: José Miguel Jerry is a 74 y.o. male.  : 1950     PCP: Hernandez Jackman MD       Chief Complaint: Coronary Artery Disease      PROBLEM LIST:  Coronary artery disease  Echo, 2018: EF >60%. AV leaflets mildly thickened. Mild AR. No significant AS.   Cardiac cath 2018: Nonobstructive plaque disease involving multiple vessels without evidence of hemodynamically significant coronary artery disease.  Cardiolite stress test, 2021: EF 59%. No evidence of ischemia.   Echo, 2022: EF 59%. Mild AR. Trace MR.   48h Holter, 03/15/2022; SR. Rare APCs, and PVCs. No significant arrhythmias.   Hypertension   Mixed dyslipidemia   GERD  Polycythemia  Dysesthesia  Neuropathic pain  Erectile dysfunction after radial prostatectomy  10. Prostate cancer  11. Low testosterone    History of Present Illness  Patient presents today for a follow-up with a history of CAD and cardiac risk factors. Since last visit, patient has been doing well from a cardiac standpoint. 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 Patient denies any chest pain, shortness of air, palpitations, orthopnea, progressive edema, presyncope or syncope. He did have a hospitalization in May for noncardiac issues. BP has been well controlled at home.     Allergies   Allergen Reactions    Chlorhexidine Rash    Clindamycin/Lincomycin Rash     itching    Neosporin [Bacitracin-Polymyxin B] Itching and Rash         Current Outpatient Medications:     amLODIPine-benazepril (LOTREL) 10-40 MG per capsule, Take 1 capsule by mouth Daily., Disp: , Rfl:     aspirin 81 MG EC tablet, Take 1 tablet by mouth Daily., Disp: , Rfl:     Azelastine HCl 137 MCG/SPRAY solution, USE 1 SPRAY IN EACH NOSTRIL AS DIRECTED  TWICE A DAY AS NEEDED FOR RHINITIS OR ALLERGIES AS DIRECTED., Disp: 90 mL, Rfl: 2    esomeprazole (nexIUM) 20 MG capsule, Take  "1 capsule by mouth As Needed., Disp: , Rfl:     hydrochlorothiazide (MICROZIDE) 12.5 MG capsule, Take 1 capsule by mouth Every Morning., Disp: , Rfl:     Lifitegrast (XIIDRA OP), Apply 1 drop to eye(s) as directed by provider 2 (Two) Times a Day., Disp: , Rfl:     metoprolol succinate XL (TOPROL-XL) 25 MG 24 hr tablet, Take 1 tablet by mouth Daily., Disp: , Rfl:     metroNIDAZOLE (METROGEL) 0.75 % gel, Apply 1 application topically to the appropriate area as directed Daily., Disp: , Rfl:     Mirabegron ER (MYRBETRIQ) 50 MG tablet sustained-release 24 hour 24 hr tablet, Take 50 mg by mouth Daily., Disp: , Rfl:     montelukast (SINGULAIR) 10 MG tablet, Take 1 tablet by mouth Every Night., Disp: 90 tablet, Rfl: 2    Multiple Vitamins-Minerals (MULTIVITAMIN ADULTS PO), Take 1 tablet by mouth Daily., Disp: , Rfl:     mupirocin (BACTROBAN) 2 % ointment, Apply 1 Application topically to the appropriate area as directed 2 (Two) Times a Day. Apply topically to the affected area twice daily, Disp: , Rfl:     nortriptyline (PAMELOR) 10 MG capsule, Take 3 capsules by mouth Every Night., Disp: , Rfl:     simvastatin (ZOCOR) 20 MG tablet, Take 1 tablet by mouth Every Night., Disp: , Rfl:     tolterodine LA (DETROL LA) 4 MG 24 hr capsule, Take 1 capsule by mouth Daily., Disp: , Rfl:     venlafaxine XR (EFFEXOR-XR) 150 MG 24 hr capsule, , Disp: , Rfl:     The following portions of the patient's history were reviewed and updated as appropriate: allergies, current medications, past family history, past medical history, past social history, past surgical history and problem list.    ROS  Review of Systems   14 point ROS negative except for that listed in the HPI.         Objective:     /62 (BP Location: Left arm, Patient Position: Sitting)   Pulse 83   Ht 185.4 cm (73\")   Wt 102 kg (225 lb 6.4 oz)   SpO2 94%   BMI 29.74 kg/m²      Physical Exam  Constitutional: Patient appears well-developed and well-nourished.   HENT: " HEENT exam unremarkable.   Neck: Neck supple. No JVD present.  Cardiovascular: Normal rate, regular rhythm and normal heart sounds. No murmur heard.   2+ symmetric pulses.   Pulmonary/Chest: Breath sounds normal. Does not exhibit tenderness.   Musculoskeletal: Does not exhibit edema.   Vitals reviewed.    Data Review:   No recent laboratory studies available for review today.         ECG 12 Lead    Date/Time: 8/13/2024 8:52 AM  Performed by: Lin Boyd PA-C    Authorized by: Lin Boyd PA-C  Comparison: compared with previous ECG from 11/27/2018  Similar to previous ECG  Rhythm: sinus rhythm  Rate: normal  BPM: 74  Conduction: left anterior fascicular block             Advance Care Planning   ACP discussion was held with the patient during this visit. Patient does not have an advance directive, declines further assistance.           Assessment and plan:      Diagnosis Plan   1. Coronary artery disease involving native coronary artery of native heart without angina pectoris  Stable without current angina. Continue aspirin and statin.      2. Essential hypertension  Well controlled. Continue current antihypertensive regimen.       3. Dyslipidemia  Continue simvastatin.        Stable cardiac status.   Continue current medications.   FU in 12 MO, sooner as needed.  Thank you for allowing us to participate in the care of your patient.       Lin Boyd PA-C      Please note that portions of this note may have been completed with a voice recognition program. Efforts were made to edit the dictations, but occasionally words are mistranscribed.

## 2024-08-13 ENCOUNTER — OFFICE VISIT (OUTPATIENT)
Dept: CARDIOLOGY | Facility: CLINIC | Age: 74
End: 2024-08-13
Payer: MEDICARE

## 2024-08-13 VITALS
HEART RATE: 83 BPM | BODY MASS INDEX: 29.87 KG/M2 | SYSTOLIC BLOOD PRESSURE: 125 MMHG | OXYGEN SATURATION: 94 % | WEIGHT: 225.4 LBS | DIASTOLIC BLOOD PRESSURE: 62 MMHG | HEIGHT: 73 IN

## 2024-08-13 DIAGNOSIS — I25.10 CORONARY ARTERY DISEASE INVOLVING NATIVE CORONARY ARTERY OF NATIVE HEART WITHOUT ANGINA PECTORIS: Primary | ICD-10-CM

## 2024-08-13 DIAGNOSIS — E78.5 DYSLIPIDEMIA: ICD-10-CM

## 2024-08-13 DIAGNOSIS — I10 ESSENTIAL HYPERTENSION: ICD-10-CM

## 2024-08-13 PROCEDURE — 1160F RVW MEDS BY RX/DR IN RCRD: CPT

## 2024-08-13 PROCEDURE — 3078F DIAST BP <80 MM HG: CPT

## 2024-08-13 PROCEDURE — 3074F SYST BP LT 130 MM HG: CPT

## 2024-08-13 PROCEDURE — 93000 ELECTROCARDIOGRAM COMPLETE: CPT

## 2024-08-13 PROCEDURE — 99214 OFFICE O/P EST MOD 30 MIN: CPT

## 2024-08-13 PROCEDURE — 1159F MED LIST DOCD IN RCRD: CPT

## 2024-09-24 ENCOUNTER — TELEPHONE (OUTPATIENT)
Dept: UROLOGY | Facility: CLINIC | Age: 74
End: 2024-09-24

## 2024-09-24 NOTE — TELEPHONE ENCOUNTER
Hub staff attempted to follow warm transfer process and was unsuccessful     Caller: [facility]    Relationship to patient:     Best call back number: [phone]     Patient is needing: SPOKE DEEPRAJ WITH [facility] INSURANCE CALLING IN REGARDS TO THE NUMBER OF TESTOPEL FOR BLOOD DONATION ON 7/22/24 . PROVIDED THE REP OUR OFFICE FAX NUMBER TO OUR OFFICE TO FAX REQUEST FOR THESE RECORDS. REPRESENTATIVE IS ASKING FOR A CALL BACK.

## 2024-10-01 ENCOUNTER — OFFICE VISIT (OUTPATIENT)
Dept: UROLOGY | Facility: CLINIC | Age: 74
End: 2024-10-01
Payer: MEDICARE

## 2024-10-01 DIAGNOSIS — N39.3 SUI (STRESS URINARY INCONTINENCE), MALE: ICD-10-CM

## 2024-10-01 DIAGNOSIS — N39.41 URGE INCONTINENCE OF URINE: Primary | ICD-10-CM

## 2024-10-01 NOTE — PROGRESS NOTES
Urodynamics     Patient Name: José Miguel Jerry  : 1950   MRN: [id number]     Labs  Brief Urine Lab Results  (Last result in the past 365 days)        Color   Clarity   Blood   Leuk Est   Nitrite   Protein   CREAT   Urine HCG        24 1047 Yellow   Clear   Negative   Negative   Negative   Negative                   Assessment    Encounter Diagnoses   Name Primary?    Urge incontinence of urine Yes    KEITH (stress urinary incontinence), male         The patient underwent urodynamics to evaluate for mixed urinary incontinence    Uroflow shows normal flow rate, some straining with urination  CMG abdominal engagement with urination  Pressure flow studies shows Valsalva leak point pressure at 151 mL and, DOI at 25 mL      Plan  1.  Follow-up with Dr. Sommer for recommendations      ERENDIRA Calderon, NP-C  [facility]

## 2024-10-09 ENCOUNTER — OFFICE VISIT (OUTPATIENT)
Dept: PULMONOLOGY | Facility: CLINIC | Age: 74
End: 2024-10-09
Payer: MEDICARE

## 2024-10-09 VITALS
HEART RATE: 71 BPM | OXYGEN SATURATION: 94 % | DIASTOLIC BLOOD PRESSURE: 82 MMHG | SYSTOLIC BLOOD PRESSURE: 142 MMHG | RESPIRATION RATE: 18 BRPM | HEIGHT: 73 IN | WEIGHT: 221 LBS | BODY MASS INDEX: 29.29 KG/M2

## 2024-10-09 DIAGNOSIS — J30.89 OTHER ALLERGIC RHINITIS: ICD-10-CM

## 2024-10-09 DIAGNOSIS — G47.33 OSA (OBSTRUCTIVE SLEEP APNEA): Primary | ICD-10-CM

## 2024-10-09 DIAGNOSIS — E66.9 OBESITY (BMI 30-39.9): ICD-10-CM

## 2024-10-09 PROCEDURE — 3079F DIAST BP 80-89 MM HG: CPT | Performed by: NURSE PRACTITIONER

## 2024-10-09 PROCEDURE — 99213 OFFICE O/P EST LOW 20 MIN: CPT | Performed by: NURSE PRACTITIONER

## 2024-10-09 PROCEDURE — 3077F SYST BP >= 140 MM HG: CPT | Performed by: NURSE PRACTITIONER

## 2024-10-09 RX ORDER — ISOTRETINOIN 40 MG/1
1 CAPSULE ORAL EVERY 12 HOURS SCHEDULED
COMMUNITY
Start: 2024-09-30

## 2024-10-09 RX ORDER — FLUTICASONE PROPIONATE 50 UG/1
SPRAY, METERED NASAL
COMMUNITY
Start: 2024-08-13

## 2024-10-09 RX ORDER — BENAZEPRIL HYDROCHLORIDE 40 MG/1
TABLET ORAL
COMMUNITY
Start: 2024-10-02

## 2024-10-09 RX ORDER — LEVOCETIRIZINE DIHYDROCHLORIDE 5 MG/1
TABLET, FILM COATED ORAL
COMMUNITY
Start: 2024-09-10

## 2024-10-09 NOTE — PROGRESS NOTES
"Chief Complaint   Patient presents with    Sleeping Problem    Follow-up         Subjective   José Miguel Jerry is a 74 y.o. male.   Patient comes back today for follow up of Obstructive Sleep apnea.      Patient says that he is compliant with his device and using it regularly.    Patient's symptoms of sleep disturbance and daytime sleepiness have been helped greatly with the use of PAP device, as prescribed.  He hates the machine.     He states the pressure is forceful blowing out and wakes him during the night some.     He uses the nasal spray as needed and it works well.      The following portions of the patient's history were reviewed and updated as appropriate: allergies, current medications, past family history, past medical history, past social history, and past surgical history.      Review of Systems   HENT:  Negative for sinus pressure, sneezing and sore throat.    Respiratory:  Negative for cough, chest tightness, shortness of breath and wheezing.        Objective   Visit Vitals  /82   Pulse 71   Resp 18   Ht 185.4 cm (73\") Comment: pt reported   Wt 100 kg (221 lb)   SpO2 94%   BMI 29.16 kg/m²       Physical Exam  Vitals reviewed.   Constitutional:       Appearance: He is well-developed.   HENT:      Head: Atraumatic.      Mouth/Throat:      Mouth: Mucous membranes are moist.      Comments: Crowded oropharynx.   Eyes:      Extraocular Movements: Extraocular movements intact.   Cardiovascular:      Rate and Rhythm: Normal rate and regular rhythm.   Abdominal:      Comments: Obese abdomen.    Skin:     General: Skin is warm.   Neurological:      Mental Status: He is alert and oriented to person, place, and time.         Assessment & Plan   Diagnoses and all orders for this visit:    1. TULIO (obstructive sleep apnea) (Primary)  -     PAP Therapy    2. Obesity (BMI 30-39.9)           Return in about 10 months (around 8/9/2025) for Recheck, For Dr. Benz, Sleep ONLY.    DISCUSSION (if any):  Titration " study performed in March 2022     Latest PAP device provided in March 2022  DME company: [employer]     Current PAP settings: 14/6  Current mask type: FFM    Will consider decreasing BiPAP to 12/5 cm once I have seen his compliance.  Unfortunately, the last compliance report I see is from June 2022.    I will ask staff to request compliance from DME.     Continue BiPAP at current settings with a modified fullface mask.  The leak seems to have been improved since he switched to this mask at his last visit.    I have placed an order for CPAP supplies.    Continue nasal spray for allergic rhinitis symptoms.    Dictated utilizing Dragon dictation.    This document was electronically signed by ERENDIRA Martinez October 9, 2024  14:33 EDT

## 2024-10-14 ENCOUNTER — HOSPITAL ENCOUNTER (OUTPATIENT)
Facility: HOSPITAL | Age: 74
Discharge: HOME | End: 2024-10-14
Admitting: UROLOGY
Payer: MEDICARE

## 2024-10-14 ENCOUNTER — OFFICE VISIT (OUTPATIENT)
Dept: UROLOGY | Facility: CLINIC | Age: 74
End: 2024-10-14
Payer: MEDICARE

## 2024-10-14 VITALS
OXYGEN SATURATION: 97 % | HEART RATE: 66 BPM | RESPIRATION RATE: 16 BRPM | SYSTOLIC BLOOD PRESSURE: 163 MMHG | TEMPERATURE: 98.2 F | DIASTOLIC BLOOD PRESSURE: 70 MMHG

## 2024-10-14 VITALS
BODY MASS INDEX: 29.29 KG/M2 | SYSTOLIC BLOOD PRESSURE: 128 MMHG | WEIGHT: 221 LBS | DIASTOLIC BLOOD PRESSURE: 76 MMHG | HEIGHT: 73 IN | HEART RATE: 78 BPM | OXYGEN SATURATION: 96 %

## 2024-10-14 DIAGNOSIS — C61 PROSTATE CANCER: ICD-10-CM

## 2024-10-14 DIAGNOSIS — D75.1 POLYCYTHEMIA: Primary | ICD-10-CM

## 2024-10-14 DIAGNOSIS — N39.3 SUI (STRESS URINARY INCONTINENCE), MALE: ICD-10-CM

## 2024-10-14 DIAGNOSIS — E29.1 HYPOGONADISM IN MALE: ICD-10-CM

## 2024-10-14 DIAGNOSIS — N39.41 URGE INCONTINENCE OF URINE: Primary | ICD-10-CM

## 2024-10-14 LAB — HCT VFR BLD AUTO: 55.9 % (ref 37.5–51)

## 2024-10-14 PROCEDURE — 3078F DIAST BP <80 MM HG: CPT | Performed by: UROLOGY

## 2024-10-14 PROCEDURE — 3074F SYST BP LT 130 MM HG: CPT | Performed by: UROLOGY

## 2024-10-14 PROCEDURE — 1159F MED LIST DOCD IN RCRD: CPT | Performed by: UROLOGY

## 2024-10-14 PROCEDURE — 1160F RVW MEDS BY RX/DR IN RCRD: CPT | Performed by: UROLOGY

## 2024-10-14 PROCEDURE — 99195 PHLEBOTOMY: CPT

## 2024-10-14 PROCEDURE — 85014 HEMATOCRIT: CPT | Performed by: UROLOGY

## 2024-10-14 PROCEDURE — 99214 OFFICE O/P EST MOD 30 MIN: CPT | Performed by: UROLOGY

## 2024-10-14 RX ORDER — SODIUM CHLORIDE 9 MG/ML
250 INJECTION, SOLUTION INTRAVENOUS ONCE
Status: DISCONTINUED | OUTPATIENT
Start: 2024-10-14 | End: 2024-10-15 | Stop reason: HOSPADM

## 2024-10-14 RX ORDER — SODIUM CHLORIDE 9 MG/ML
250 INJECTION, SOLUTION INTRAVENOUS ONCE
OUTPATIENT
Start: 2024-10-21

## 2024-10-14 NOTE — PROGRESS NOTES
CC  Mixed incontinence    HPI  Mr. Jerry is a 74 y.o. male with history below in assessment, who presents for follow up.     At this visit has leakage day and night, all the time, not only with straining     Past Medical History:   Diagnosis Date    Abnormal stress test 08/14/2018    1. MPS 8-8-18: Positive EKG changes Reversible Inferior ischemia EF 60%      Depression     Dyspnea on exertion     Elevated cholesterol     Erectile dysfunction     History of echocardiogram 08/2018    History of exercise stress test 08/2018    History of pneumonia 03/21/2019    Reports 40+ years ago    Hormone disorder 2013    Hypogonadism    Hypertension     Prostate cancer 2012    Seasonal allergies     reports mild    Sleep apnea     Uses CPAP HS - instructed to bring in DOS    Urinary incontinence June 2024    Wears glasses        Past Surgical History:   Procedure Laterality Date    APPENDECTOMY      CARDIAC CATHETERIZATION N/A 08/20/2018    Procedure: Left Heart Cath;  Surgeon: Anastacia Gary MD;  Location:  [facility];  Service: Cardiology    CARDIOVASCULAR STRESS TEST  12/14/2021    CATARACT EXTRACTION, BILATERAL      CHOLECYSTECTOMY N/A 12/29/2021    Procedure: CHOLECYSTECTOMY LAPAROSCOPIC;  Surgeon: Caleb Edmond MD;  Location:  [facility];  Service: General;  Laterality: N/A;    COLONOSCOPY      GALLBLADDER SURGERY  2021    HEAD/NECK LESION/CYST EXCISION Right 10/31/2016    Procedure: EXCISE BCCA RIGHT SCALP;  Surgeon: Lowell Rider MD;  Location:  [facility];  Service:     NASAL SEPTUM SURGERY      PENILE PROSTHESIS IMPLANT N/A 11/28/2018    Procedure: PENILE PROSTHESIS PLACEMENT;  Surgeon: Nick Sommer MD;  Location:  [facility];  Service: Urology    PROSTATE SURGERY      SKIN BIOPSY      patient reports basal cell carcinoma removed from head x3 places    TEETH EXTRACTION           Current Outpatient Medications:     aspirin 81 MG EC tablet, Take 1 tablet by mouth Daily., Disp: , Rfl:      "Azelastine HCl 137 MCG/SPRAY solution, USE 1 SPRAY IN EACH NOSTRIL AS DIRECTED  TWICE A DAY AS NEEDED FOR RHINITIS OR ALLERGIES AS DIRECTED., Disp: 90 mL, Rfl: 2    benazepril (LOTENSIN) 40 MG tablet, , Disp: , Rfl:     esomeprazole (nexIUM) 20 MG capsule, Take 1 capsule by mouth As Needed., Disp: , Rfl:     fluticasone (FLONASE) 50 MCG/ACT nasal spray, 2 SPRAY in each nostril once a day DAILY, Disp: , Rfl:     hydrochlorothiazide (MICROZIDE) 12.5 MG capsule, Take 1 capsule by mouth Every Morning., Disp: , Rfl:     ISOtretinoin (ACCUTANE) 40 MG capsule, Take 1 capsule by mouth Every 12 (Twelve) Hours., Disp: , Rfl:     levocetirizine (XYZAL) 5 MG tablet, take 1 at bedtime, Disp: , Rfl:     Lifitegrast (XIIDRA OP), Apply 1 drop to eye(s) as directed by provider 2 (Two) Times a Day., Disp: , Rfl:     metoprolol succinate XL (TOPROL-XL) 25 MG 24 hr tablet, Take 1 tablet by mouth Daily., Disp: , Rfl:     metroNIDAZOLE (METROGEL) 0.75 % gel, Apply 1 application topically to the appropriate area as directed Daily., Disp: , Rfl:     Mirabegron ER (MYRBETRIQ) 50 MG tablet sustained-release 24 hour 24 hr tablet, Take 50 mg by mouth Daily., Disp: , Rfl:     montelukast (SINGULAIR) 10 MG tablet, Take 1 tablet by mouth Every Night., Disp: 90 tablet, Rfl: 2    Multiple Vitamins-Minerals (MULTIVITAMIN ADULTS PO), Take 1 tablet by mouth Daily., Disp: , Rfl:     mupirocin (BACTROBAN) 2 % ointment, Apply 1 Application topically to the appropriate area as directed 2 (Two) Times a Day. Apply topically to the affected area twice daily, Disp: , Rfl:     nortriptyline (PAMELOR) 10 MG capsule, Take 3 capsules by mouth Every Night., Disp: , Rfl:     simvastatin (ZOCOR) 20 MG tablet, Take 1 tablet by mouth Every Night., Disp: , Rfl:     venlafaxine XR (EFFEXOR-XR) 150 MG 24 hr capsule, , Disp: , Rfl:      Physical Exam  Visit Vitals  /76   Pulse 78   Ht 185.4 cm (72.99\")   Wt 100 kg (221 lb)   SpO2 96%   BMI 29.16 kg/m² "       Labs  Brief Urine Lab Results  (Last result in the past 365 days)        Color   Clarity   Blood   Leuk Est   Nitrite   Protein   CREAT   Urine HCG        07/05/24 1047 Yellow   Clear   Negative   Negative   Negative   Negative                   Lab Results   Component Value Date    GLUCOSE 127 (H) 12/27/2021    CALCIUM 9.0 12/27/2021     12/27/2021    K 3.9 12/27/2021    CO2 26.0 12/27/2021     12/27/2021    BUN 11 12/27/2021    CREATININE 0.91 12/27/2021    EGFRIFNONA 82 12/27/2021    BCR 12.1 12/27/2021    ANIONGAP 9.0 12/27/2021       Lab Results   Component Value Date    WBC 5.15 12/27/2021    HGB 16.9 07/22/2024    HCT 51.8 (H) 07/22/2024    MCV 82.6 12/27/2021     12/27/2021     Lab Results   Component Value Date    HGBA1C 5.70 (H) 08/19/2018         Lab Results   Component Value Date    PSA <0.014 07/05/2024    PSA <0.014 07/27/2023    PSA <0.014 03/27/2023       Lab Results   Component Value Date    TESTFRE 7.2 04/22/2024    TESTOSTEROTT 361 04/22/2024        Radiographic Studies  No Images in the past 120 days found..      I have reviewed above labs and imaging.     Assessment  74 y.o. male with history of prostate cancer, status post radical prostatectomy.  History of penile implant for ED.  Has had worsening incontinence, with mixed urinary incontinence on urodynamic study.  Detrusor overactivity at only 25 mL.  Positive stress incontinence with VLPP of 126, which indicates fairly mild stress incontinence after prostatectomy.    Patient has tried over 3 different overactive bladder medications in the past, multiple anticholinergics and now on Myrbetriq.  He empties his bladder completely.    Plan  1. Trial of botox 100u. Risks discussed including UTI and retention

## 2024-10-16 ENCOUNTER — TELEPHONE (OUTPATIENT)
Dept: UROLOGY | Facility: CLINIC | Age: 74
End: 2024-10-16

## 2024-10-16 NOTE — TELEPHONE ENCOUNTER
Caller: ELEANOR MONTERO    Relationship to patient: SELF    Best call back number: [phone]    Patient is needing: PT RECEIVED INSURANCE APPROVAL FOR BOTOX  APPROVAL #[id number]

## 2024-10-21 ENCOUNTER — PROCEDURE VISIT (OUTPATIENT)
Dept: UROLOGY | Facility: CLINIC | Age: 74
End: 2024-10-21
Payer: MEDICARE

## 2024-10-21 VITALS
RESPIRATION RATE: 16 BRPM | HEART RATE: 66 BPM | HEIGHT: 73 IN | OXYGEN SATURATION: 97 % | DIASTOLIC BLOOD PRESSURE: 70 MMHG | BODY MASS INDEX: 29.29 KG/M2 | TEMPERATURE: 98.2 F | SYSTOLIC BLOOD PRESSURE: 163 MMHG | WEIGHT: 221 LBS

## 2024-10-21 DIAGNOSIS — N39.41 URGE INCONTINENCE OF URINE: Primary | ICD-10-CM

## 2024-10-21 LAB
BILIRUB BLD-MCNC: NEGATIVE MG/DL
CLARITY, POC: CLEAR
COLOR UR: YELLOW
EXPIRATION DATE: NORMAL
GLUCOSE UR STRIP-MCNC: NEGATIVE MG/DL
KETONES UR QL: NEGATIVE
LEUKOCYTE EST, POC: NEGATIVE
Lab: NORMAL
NITRITE UR-MCNC: NEGATIVE MG/ML
PH UR: 6.5 [PH] (ref 5–8)
PROT UR STRIP-MCNC: NEGATIVE MG/DL
RBC # UR STRIP: NEGATIVE /UL
SP GR UR: 1.02 (ref 1–1.03)
UROBILINOGEN UR QL: NORMAL

## 2024-10-21 PROCEDURE — 52287 CYSTOSCOPY CHEMODENERVATION: CPT | Performed by: UROLOGY

## 2024-10-21 PROCEDURE — 81003 URINALYSIS AUTO W/O SCOPE: CPT | Performed by: UROLOGY

## 2024-10-21 RX ORDER — TOLTERODINE 4 MG/1
CAPSULE, EXTENDED RELEASE ORAL
COMMUNITY
Start: 2024-10-15

## 2024-10-21 NOTE — PROGRESS NOTES
OPERATIVE NOTE:    DATE:  10/21/24    OPERATION PERFORMED:   Cystoscopy with bladder Botox (onabotulinumtoxin A) injection ( 100 units). NDC#0398-4500-31    PREOPERATIVE DIAGNOSES:   1. Refractory Overactive bladder  2. Urinary incontinence.     POSTOPERATIVE DIAGNOSES:   Same     SURGEON:   Nick Sommer MD    ANESTHESIA: 30 cc of 2% viscous lidocaine, kept for 20 minutes before procedure.    SPECIMEN: none    BLOOD LOSS :  0 ml    COMPLICATIONS: none    INDICATION: José Miguel Jerry is a 74 y.o. yrs old patient with Refractory Overactive bladder, who has failed multiple PO meds. After discussion, a decision was made to proceed with bladder botox injection.   The efficacy is close to 80% with 6% chance of retention of urine and need for self catheterization. Pt will usually need re-treatment in 6 months.    DESCRIPTION OF OPERATION:   Patient brought into the clinic procedure room, was placed in supine position. Llidocaine was placed in bladder with catheter and kept for 30 minutes before procedure. Pt was then converted to a dorsal lithotomy position. The genitalia prepped and draped in the usual sterile manner. Pt received PO antibiotics before the procedure.    We then placed a flexible cystoscope per urethra. The bladder was inspected. There were no lesions in the bladder or in the urethra. There were no tumors or calculi. There was no foreign body.     We reconstituted the Botox on a back table. 100 units were placed in 10 mL of saline to create a 10 unit/mL dilution. We used a bladder map, injecting the trigone, lateral wall, and posterior wall, injecting 1 mL into each site at a 5 mm depth. Botox was injected without any complications. We took care to avoid any significant vessels. There was no evidence of any bleeding or systemic absorption at the end of the procedure. The cystoscope was removed. The patient tolerated this procedure well.

## 2024-10-25 ENCOUNTER — TELEPHONE (OUTPATIENT)
Dept: PULMONOLOGY | Facility: CLINIC | Age: 74
End: 2024-10-25
Payer: MEDICARE

## 2024-10-25 NOTE — TELEPHONE ENCOUNTER
"        Hub staff attempted to follow warm transfer process and was unsuccessful     Caller: José Miguel Jerry    Relationship to patient: Self    Best call back number: [phone]    Patient is needing: ORDER SENT OVER FOR BIPAP MACHINE. WAS TOLD BY THE NUMBER THAT WAS GIVEN TO PT THAT \"HE NEEDS AN ORDER AND THAT ONE HADNT BEEN SENT\". CAN SOMEONE CHECK INTO THAT FOR THE PT?      "

## 2024-10-25 NOTE — TELEPHONE ENCOUNTER
I spoke with Bandar from [facility] and he has everything he needs from us to get this patient PAP supplies. He will be giving the patient a call and take care of this.

## 2024-11-04 ENCOUNTER — OFFICE VISIT (OUTPATIENT)
Dept: UROLOGY | Facility: CLINIC | Age: 74
End: 2024-11-04
Payer: MEDICARE

## 2024-11-04 VITALS
HEIGHT: 73 IN | SYSTOLIC BLOOD PRESSURE: 140 MMHG | WEIGHT: 221 LBS | DIASTOLIC BLOOD PRESSURE: 78 MMHG | RESPIRATION RATE: 20 BRPM | HEART RATE: 69 BPM | BODY MASS INDEX: 29.29 KG/M2

## 2024-11-04 DIAGNOSIS — N39.41 URGE INCONTINENCE OF URINE: Primary | ICD-10-CM

## 2024-11-04 PROCEDURE — 51798 US URINE CAPACITY MEASURE: CPT | Performed by: NURSE PRACTITIONER

## 2024-11-04 PROCEDURE — 1159F MED LIST DOCD IN RCRD: CPT | Performed by: NURSE PRACTITIONER

## 2024-11-04 PROCEDURE — 3078F DIAST BP <80 MM HG: CPT | Performed by: NURSE PRACTITIONER

## 2024-11-04 PROCEDURE — 1160F RVW MEDS BY RX/DR IN RCRD: CPT | Performed by: NURSE PRACTITIONER

## 2024-11-04 PROCEDURE — 99214 OFFICE O/P EST MOD 30 MIN: CPT | Performed by: NURSE PRACTITIONER

## 2024-11-04 PROCEDURE — 3077F SYST BP >= 140 MM HG: CPT | Performed by: NURSE PRACTITIONER

## 2024-11-04 RX ORDER — SIMETHICONE 80 MG/1
TABLET, CHEWABLE ORAL
COMMUNITY
Start: 2024-10-28

## 2024-11-04 RX ORDER — AZITHROMYCIN 250 MG/1
TABLET, FILM COATED ORAL
COMMUNITY
Start: 2024-10-31

## 2024-11-04 RX ORDER — ONDANSETRON 4 MG/1
TABLET, ORALLY DISINTEGRATING ORAL
COMMUNITY
Start: 2024-10-28

## 2024-11-04 RX ORDER — CEFDINIR 300 MG/1
CAPSULE ORAL
COMMUNITY
Start: 2024-10-28

## 2024-11-04 NOTE — PROGRESS NOTES
Office Visit Established Male Patient     Patient Name: José Miguel Jerry  : 1950   MRN: [id number]     Chief Complaint:   Chief Complaint   Patient presents with    urge incontinence       History of Present Illness: Mr. José Miguel Jerry is a 74 y.o. male who presents today for follow up 2 weeks status post bladder Botox.   Botox has not worked for him at all  Stll using 2-3 pads daily  Nocturia x 1   Frequency 2-3 hours  Urgency  Many times he can tell he has released some urine like there is no muscle to stop the leaking     Subjective      Review of System:   As noted in HPI    Past Medical History:   Past Medical History:   Diagnosis Date    Abnormal stress test 2018    1. MPS 8-8-18: Positive EKG changes Reversible Inferior ischemia EF 60%      Depression     Dyspnea on exertion     Elevated cholesterol     Erectile dysfunction     History of echocardiogram 2018    History of exercise stress test 2018    History of pneumonia 2019    Reports 40+ years ago    Hormone disorder 2013    Hypogonadism    Hypertension     Prostate cancer 2012    Seasonal allergies     reports mild    Sleep apnea     Uses CPAP HS - instructed to bring in DOS    Urinary incontinence 2024    Wears glasses        Past Surgical History:   Past Surgical History:   Procedure Laterality Date    APPENDECTOMY      CARDIAC CATHETERIZATION N/A 2018    Procedure: Left Heart Cath;  Surgeon: Anastacia Gary MD;  Location:  [facility];  Service: Cardiology    CARDIOVASCULAR STRESS TEST  2021    CATARACT EXTRACTION, BILATERAL      CHOLECYSTECTOMY N/A 2021    Procedure: CHOLECYSTECTOMY LAPAROSCOPIC;  Surgeon: Caleb Edmond MD;  Location:  [facility];  Service: General;  Laterality: N/A;    COLONOSCOPY      GALLBLADDER SURGERY      HEAD/NECK LESION/CYST EXCISION Right 10/31/2016    Procedure: EXCISE BCCA RIGHT SCALP;  Surgeon: Lowell Rider MD;  Location:  [facility];   Service:     NASAL SEPTUM SURGERY      PENILE PROSTHESIS IMPLANT N/A 11/28/2018    Procedure: PENILE PROSTHESIS PLACEMENT;  Surgeon: Nick Sommer MD;  Location: [facility];  Service: Urology    PROSTATE SURGERY      SKIN BIOPSY      patient reports basal cell carcinoma removed from head x3 places    TEETH EXTRACTION         Family History:   Family History   Problem Relation Age of Onset    Hypertension Mother     Heart disease Mother         A fib    Kidney disease Father         Alcohol abuse    Liver disease Father     Heart disease Father     Hypertension Father     Heart attack Sister         Atrial fibrillation    Heart attack Brother     Heart disease Maternal Grandfather         Enlarged heart    Hypertension Maternal Grandfather     Heart attack Maternal Grandfather     Heart disease Paternal Grandfather     Hypertension Paternal Grandfather         Hypertension    Heart attack Paternal Grandfather     Heart disease Daughter        Social History:   Social History     Socioeconomic History    Marital status:    Tobacco Use    Smoking status: Never     Passive exposure: Never    Smokeless tobacco: Never   Vaping Use    Vaping status: Never Used   Substance and Sexual Activity    Alcohol use: Yes     Alcohol/week: 2.0 standard drinks of alcohol     Types: 1 Glasses of wine, 1 Cans of beer per week     Comment: Social use, reports no HX of abuse    Drug use: Never    Sexual activity: Yes     Partners: Female     Birth control/protection: None       Medications:     Current Outpatient Medications:     azithromycin (ZITHROMAX) 250 MG tablet, take TWO tablets ON DAY ONE THEN ONE tablet daily ON DAYS 2-5, Disp: , Rfl:     cefdinir (OMNICEF) 300 MG capsule, 1 Orally Twice Daily 10 days, Disp: , Rfl:     Gas Relief 80 MG chewable tablet, 1 tablet after meals and at bedtime Orally Four times a day, Disp: , Rfl:     ondansetron ODT (ZOFRAN-ODT) 4 MG disintegrating tablet, ONE tablet as directed FOUR  times a DAY as needed FOR 10 DAYS, Disp: , Rfl:     aspirin 81 MG EC tablet, Take 1 tablet by mouth Daily., Disp: , Rfl:     Azelastine HCl 137 MCG/SPRAY solution, USE 1 SPRAY IN EACH NOSTRIL AS DIRECTED  TWICE A DAY AS NEEDED FOR RHINITIS OR ALLERGIES AS DIRECTED., Disp: 90 mL, Rfl: 2    benazepril (LOTENSIN) 40 MG tablet, , Disp: , Rfl:     esomeprazole (nexIUM) 20 MG capsule, Take 1 capsule by mouth As Needed., Disp: , Rfl:     hydrochlorothiazide (MICROZIDE) 12.5 MG capsule, Take 1 capsule by mouth Every Morning., Disp: , Rfl:     ISOtretinoin (ACCUTANE) 40 MG capsule, Take 1 capsule by mouth Every 12 (Twelve) Hours., Disp: , Rfl:     levocetirizine (XYZAL) 5 MG tablet, take 1 at bedtime, Disp: , Rfl:     Lifitegrast (XIIDRA OP), Apply 1 drop to eye(s) as directed by provider 2 (Two) Times a Day., Disp: , Rfl:     metoprolol succinate XL (TOPROL-XL) 25 MG 24 hr tablet, Take 1 tablet by mouth Daily., Disp: , Rfl:     metroNIDAZOLE (METROGEL) 0.75 % gel, Apply 1 application topically to the appropriate area as directed Daily., Disp: , Rfl:     Mirabegron ER (MYRBETRIQ) 50 MG tablet sustained-release 24 hour 24 hr tablet, Take 50 mg by mouth Daily., Disp: , Rfl:     montelukast (SINGULAIR) 10 MG tablet, Take 1 tablet by mouth Every Night., Disp: 90 tablet, Rfl: 2    Multiple Vitamins-Minerals (MULTIVITAMIN ADULTS PO), Take 1 tablet by mouth Daily., Disp: , Rfl:     mupirocin (BACTROBAN) 2 % ointment, Apply 1 Application topically to the appropriate area as directed 2 (Two) Times a Day. Apply topically to the affected area twice daily, Disp: , Rfl:     nortriptyline (PAMELOR) 10 MG capsule, Take 3 capsules by mouth Every Night., Disp: , Rfl:     simvastatin (ZOCOR) 20 MG tablet, Take 1 tablet by mouth Every Night., Disp: , Rfl:     venlafaxine XR (EFFEXOR-XR) 150 MG 24 hr capsule, , Disp: , Rfl:     Allergies:   Allergies   Allergen Reactions    Chlorhexidine Rash    Clindamycin/Lincomycin Rash     itching     "Neosporin [Bacitracin-Polymyxin B] Itching and Rash       Objective     Physical Exam:   Vital Signs:   Vitals:    11/04/24 1352   BP: 140/78   Pulse: 69   Resp: 20   Weight: 100 kg (221 lb)   Height: 185.4 cm (72.99\")     Body mass index is 29.16 kg/m².     Physical Exam  Vitals and nursing note reviewed.   Constitutional:       General: He is not in acute distress.     Appearance: Normal appearance. He is well-groomed and overweight. He is not ill-appearing.   Pulmonary:      Effort: Pulmonary effort is normal. No respiratory distress.   Skin:     General: Skin is warm and dry.   Neurological:      General: No focal deficit present.      Mental Status: He is alert and oriented to person, place, and time.   Psychiatric:         Mood and Affect: Mood normal.         Behavior: Behavior normal.        Labs  Brief Urine Lab Results  (Last result in the past 365 days)        Color   Clarity   Blood   Leuk Est   Nitrite   Protein   CREAT   Urine HCG        10/21/24 1154 Yellow   Clear   Negative   Negative   Negative   Negative                   Lab Results   Component Value Date    GLUCOSE 127 (H) 12/27/2021    CALCIUM 9.0 12/27/2021     12/27/2021    K 3.9 12/27/2021    CO2 26.0 12/27/2021     12/27/2021    BUN 11 12/27/2021    CREATININE 0.91 12/27/2021    EGFRIFNONA 82 12/27/2021    BCR 12.1 12/27/2021    ANIONGAP 9.0 12/27/2021       Lab Results   Component Value Date    WBC 5.15 12/27/2021    HGB 16.9 07/22/2024    HCT 55.9 (H) 10/14/2024    MCV 82.6 12/27/2021     12/27/2021            Radiographic Studies  No Images in the past 120 days found..    I have reviewed the above labs and imaging.     PVR  Post-void residual performed with ultrasound scanner by staff and interpreted by jose khan     Assessment / Plan      Assessment/Plan:   Diagnoses and all orders for this visit:    1. Urge incontinence of urine (Primary)  -     Sacral Neuromodulation Evaluation; Future    74-year-old male PMH " prostate cancer treated with prostatectomy, hypogonadism, HTN, and TULIO here to follow-up 2 weeks status post bladder Botox.  Unfortunately Botox has not improved patient's urinary incontinence.  He continues to leak overnight and throughout the day, he is using 2-3 pads daily.  We discussed InterStim trial to evaluate improvement in symptoms.  He has decided on a PNE at this time.    Schedule PNE with Dr. Sommer    Surgical concerns: BMI 29,TULIO, CAD    ERENDIRA Calderon,NP-C  [facility]

## 2024-11-25 ENCOUNTER — PROCEDURE VISIT (OUTPATIENT)
Dept: UROLOGY | Facility: CLINIC | Age: 74
End: 2024-11-25
Payer: MEDICARE

## 2024-11-25 ENCOUNTER — LAB (OUTPATIENT)
Dept: LAB | Facility: HOSPITAL | Age: 74
End: 2024-11-25
Payer: MEDICARE

## 2024-11-25 DIAGNOSIS — C61 PROSTATE CANCER: ICD-10-CM

## 2024-11-25 DIAGNOSIS — D75.1 POLYCYTHEMIA: ICD-10-CM

## 2024-11-25 DIAGNOSIS — E29.1 HYPOGONADISM IN MALE: ICD-10-CM

## 2024-11-25 DIAGNOSIS — D75.1 POLYCYTHEMIA: Primary | ICD-10-CM

## 2024-11-25 DIAGNOSIS — E29.1 HYPOGONADISM IN MALE: Primary | ICD-10-CM

## 2024-11-25 LAB
HCT VFR BLD AUTO: 58.4 % (ref 37.5–51)
HGB BLD-MCNC: 18.9 G/DL (ref 13–17.7)
PSA SERPL-MCNC: <0.014 NG/ML (ref 0–4)

## 2024-11-25 PROCEDURE — 84402 ASSAY OF FREE TESTOSTERONE: CPT

## 2024-11-25 PROCEDURE — 85018 HEMOGLOBIN: CPT

## 2024-11-25 PROCEDURE — 84153 ASSAY OF PSA TOTAL: CPT

## 2024-11-25 PROCEDURE — 84403 ASSAY OF TOTAL TESTOSTERONE: CPT

## 2024-11-25 PROCEDURE — 36415 COLL VENOUS BLD VENIPUNCTURE: CPT

## 2024-11-25 PROCEDURE — 11980 IMPLANT HORMONE PELLET(S): CPT | Performed by: UROLOGY

## 2024-11-25 PROCEDURE — 85014 HEMATOCRIT: CPT

## 2024-11-25 NOTE — PROGRESS NOTES
"OFFICE PROCEDURE NOTE      PREPROCEDURE DIAGNOSIS:  Hypogonadism.      POSTPROCEDURE DIAGNOSIS:  Hypogonadism.      PROCEDURE:  Testopel (NDC# 58849-070-00) insertion/implant in Right flank,  11 pellets implanted.      SURGEON:    Nick Sommer MD    ANESTHESIA:  Local.      Labs reviewed    No components found for: \"TESTFRE\", \"TESTOSTEROTT\"     Lab Results   Component Value Date    HCT 55.9 (H) 10/14/2024        Lab Results   Component Value Date    PSA <0.014 07/05/2024    PSA <0.014 07/27/2023    PSA <0.014 03/27/2023        The risks/benefits of the procedure were discussed with the patient.     DESCRIPTION OF PROCEDURE: The patient was placed in the lateral decubitus position and his skin was prepped with chlorhexadine solution. 10 ml of 1% lidocaine w/epinephrine were instilled subcutaneously in a straight fashion. A 3mm skin puncture was made with an 11 blade. The trochar was placed subcutaneously along the line with ease and without patient discomfort. The sharp stylet was removed and the pellets were placed along the track and positioned using the blunt stylet. Following this,  a steri strip was used to close the puncture wound. A 4/4 gauze pad was placed over the wound and a Tegaderm bandage was applied and the patient was repositioned on his opposite side for compression purposes with a roll.      PLAN:  The patient was discharged in stable condition to be followed up with serial serum testosterone levels. Follow up for next visit in 4 months for [place] with [facility].  He needs to get phlebotomy, and needs to get testosterone labs checked since they did not draw them.    "

## 2024-11-26 ENCOUNTER — TELEPHONE (OUTPATIENT)
Dept: UROLOGY | Facility: CLINIC | Age: 74
End: 2024-11-26
Payer: MEDICARE

## 2024-11-26 NOTE — TELEPHONE ENCOUNTER
Informed patient of Dr. Sommer message below he voiced understanding and is going to get phlebotomy done today or tomorrow. He also said he is using his cpap machine and feels it is working         Please call patient and tell him that he needs to get phlebotomy done in the next 24 to 48 hours.  Hematocrit is starting to get dangerously high.  He also needs to make sure he is using his CPAP and follow-up with pulmonology to make sure that it is working properly.  This is a contributor to the polycythemia.  Please tell them I have also placed a referral to hematology to get any additional guidance on frequency of phlebotomy or other strategies to reduce hematocrit     Olga,CMA

## 2024-11-26 NOTE — PROGRESS NOTES
Please call patient and tell him that he needs to get phlebotomy done in the next 24 to 48 hours.  Hematocrit is starting to get dangerously high.  He also needs to make sure he is using his CPAP and follow-up with pulmonology to make sure that it is working properly.  This is a contributor to the polycythemia.  Please tell them I have also placed a referral to hematology to get any additional guidance on frequency of phlebotomy or other strategies to reduce hematocrit.

## 2024-11-27 ENCOUNTER — TELEPHONE (OUTPATIENT)
Dept: UROLOGY | Facility: CLINIC | Age: 74
End: 2024-11-27

## 2024-11-27 NOTE — TELEPHONE ENCOUNTER
Caller: ELEANOR MONTERO     Relationship: SELF    Best call back number: [phone]    What is your medical concern? INCOMING CALL FROM PT. PT SAYS IN ORDER FOR HS INTERSTIM TO BE COVERED IT NEEDS TO BE BILLED TO MEDICARE PART D. PT SAYS HE HAS CALLED MULTIPLE TIMES AND THIS ISSUE HAS NOT BEEN RESOLVED.    How long has this issue been going on? N/A    Is your provider already aware of this issue? N/A    Have you been treated for this issue? N/A

## 2024-11-28 LAB
TESTOST FREE SERPL-MCNC: 7.3 PG/ML (ref 6.6–18.1)
TESTOST SERPL-MCNC: 404 NG/DL (ref 264–916)

## 2024-12-04 NOTE — PRE-PROCEDURE INSTRUCTIONS
Confirmed arrival time for patient's interstim evaluation with Dr. Kemal grove          at [facility]. PAT phone history completed with patient at this time, we reviewed medical/surgical history and updated medication list as needed. Instructed patient to bring picture ID and insurance card and informed patient where to come and register. All questions answered, patient verbalized understanding.

## 2024-12-05 ENCOUNTER — HOSPITAL ENCOUNTER (OUTPATIENT)
Dept: PREOP | Facility: HOSPITAL | Age: 74
Discharge: HOME | End: 2024-12-05
Payer: MEDICARE

## 2024-12-05 VITALS
HEART RATE: 109 BPM | BODY MASS INDEX: 27.83 KG/M2 | TEMPERATURE: 98 F | SYSTOLIC BLOOD PRESSURE: 151 MMHG | HEIGHT: 73 IN | OXYGEN SATURATION: 96 % | DIASTOLIC BLOOD PRESSURE: 89 MMHG | WEIGHT: 210 LBS | RESPIRATION RATE: 15 BRPM

## 2024-12-05 DIAGNOSIS — N39.41 URGE INCONTINENCE OF URINE: Primary | ICD-10-CM

## 2024-12-05 PROCEDURE — C1897 LEAD, NEUROSTIM TEST KIT: HCPCS

## 2024-12-05 PROCEDURE — A9270 NON-COVERED ITEM OR SERVICE: HCPCS | Performed by: UROLOGY

## 2024-12-05 PROCEDURE — C1787 PATIENT PROGR, NEUROSTIM: HCPCS

## 2024-12-05 PROCEDURE — 25010000002 LIDOCAINE 1% - EPINEPHRINE 1:100000 1 %-1:100000 SOLUTION: Performed by: UROLOGY

## 2024-12-05 PROCEDURE — 63710000001 SULFAMETHOXAZOLE-TRIMETHOPRIM 800-160 MG TABLET: Performed by: UROLOGY

## 2024-12-05 RX ORDER — SULFAMETHOXAZOLE AND TRIMETHOPRIM 800; 160 MG/1; MG/1
1 TABLET ORAL ONCE
Status: COMPLETED | OUTPATIENT
Start: 2024-12-05 | End: 2024-12-05

## 2024-12-05 RX ORDER — LIDOCAINE HYDROCHLORIDE AND EPINEPHRINE 10; 10 MG/ML; UG/ML
20 INJECTION, SOLUTION INFILTRATION; PERINEURAL ONCE
Status: COMPLETED | OUTPATIENT
Start: 2024-12-05 | End: 2024-12-05

## 2024-12-05 RX ADMIN — LIDOCAINE HYDROCHLORIDE AND EPINEPHRINE 20 ML: 10; 10 INJECTION, SOLUTION INFILTRATION; PERINEURAL at 09:42

## 2024-12-05 RX ADMIN — SULFAMETHOXAZOLE AND TRIMETHOPRIM 1 TABLET: 800; 160 TABLET ORAL at 08:57

## 2024-12-05 NOTE — DISCHARGE INSTRUCTIONS
Home Care After Interstim Test Leads  The following instructions will help you care for yourself, or be cared for upon your return home today.  These are guidelines for your care right after the procedure only.     Diet  Continue your regular diet today.    Activity  Start normal activities in twenty-four (24) hours  Try to avoid any extreme physical activity while the leads are in.    Wound Care and Hygiene  Front showering or sponge bath is ok    What to Expect after Procedure  Soreness over the needle site  Mild bleeding at the needle site.    Call your Doctor  Severe pain not controlled by oral medication  Temperature above 101.5 degrees  Inability to urinate within eight (8) hours after surgery  Drainage from the incision    Other Contacts  Urology Office:  [id number]   [place] [address]  [phone] office  [phone] fax    Follow up Appointment  You have a follow up scheduled with Dr. Sommer's APRN next week

## 2024-12-05 NOTE — PROCEDURES
Procedure Note     SURGEON: Nick Sommer MD    PREOPERATIVE DIAGNOSIS: Refractory Urge Urinary Incontinence     POSTOPERATIVE DIAGNOSIS: Same     PROCEDURE PERFORMED:   1. Basic Evaluation (PNE) without fluoroscopy using the enhanced Verify™ System - (CPT Code: 75763-70), bilateral lead placement    ANESTHESIA: Lidocaine 2%     BLOOD LOSS: Minimal.     SPECIMEN: None.     COMPLICATION: None.     INDICATION FOR PROCEDURE: Tin Jerry is a 74 y.o. y.o.-year-old with Refractory Overactive bladder / UUI.  After discussion a decision was made to proceed with a trial of InterStim placement with basic evaluation.     DESCRIPTION OF THE PROCEDURE:   The Patient was properly identified and placed in prone position. Pillows were placed under lower abdomen to flatten sacrum and under shins to allow the toes to dangle freely. A ground pad was placed on the bottom of the patient’s foot and the proximal ends of the test stimulation cable were connected to the ground pad and the external neurostimulator (ENS). Patient was prepped and draped in usual sterile fashion.   The sciatic notches and sacral midline were identified via palpation. The level of S3 was identified by measuring 9cm from the tip of the coccyx. Local injection of 2% Lidocaine was administered at foramen needle entry point located 2cm lateral to the sacral midline and 2cm cephalad of sciatic notch level. A foramen needle was introduced at an approximate 60 degree angle, feeling for the foraminal margins until S3 was identified. Proper needle position was confirmed by the patient identifying location of stimulation sensation; and direct observation of the lifting of the perineum or “bellowing,” and plantar flexion of the great toe utilizing the test stimulation cable, ENS and Verify™ .     The foramen needle stylet was removed, and a percutaneous lead was inserted to proper depth identified by markers on the lead. The lead was tested by  connecting the test stimulation cable to the proximal lead electrode and testing with the ENS and . Upon response confirmation, the foramen needle was removed by sliding over the percutaneous lead. The foramen needle and lead stylet were removed while securing lead location. Retesting to confirm appropriate lead response was completed.   The above procedure was performed again on the contralateral side.     The leads were connected to the patient cables. The Patient’s skin was cleaned and external cabling was secured by covering with transparent dressing. Estimated blood loss was minimal. Post procedure, the patient was programmed with the ENS and Verify™  to optimum sensation via the lead and provided utilization instructions prior to discharge. Patient will complete a bladder diary during testing period to help document results of this procedure. The patient will follow up in 1 week for full interstim implantation if they have >50% benefit.

## 2024-12-10 ENCOUNTER — TELEPHONE (OUTPATIENT)
Dept: UROLOGY | Facility: CLINIC | Age: 74
End: 2024-12-10

## 2024-12-10 NOTE — TELEPHONE ENCOUNTER
Called patient and advise him to call Paris and to inform her what's going on. I called Paris and patient still hasn't called her so she will give him a call.    Abimael SANTA

## 2024-12-10 NOTE — TELEPHONE ENCOUNTER
Hub staff attempted to follow warm transfer process and was unsuccessful     Caller: José Miguel Jerry    Relationship to patient: Self    Best call back number: [phone]    Patient is needing: PT CALLED IN REGARDS TO THE DEVICE AND STATED HE WOULD LIKE TO TURN IT OFF TODAY IF POSSIBLE  BECAUSE IT IS NOT MEETING HIS EXPECTATIONS AND REQUESTING A SOONER APPOINTMENT 12/12/24 OFFICE PLEASE ADVISE PT AS TO WHAT TO DO.

## 2024-12-12 ENCOUNTER — OFFICE VISIT (OUTPATIENT)
Dept: UROLOGY | Facility: CLINIC | Age: 74
End: 2024-12-12
Payer: MEDICARE

## 2024-12-12 VITALS
OXYGEN SATURATION: 98 % | WEIGHT: 210 LBS | HEIGHT: 73 IN | TEMPERATURE: 97.2 F | HEART RATE: 70 BPM | DIASTOLIC BLOOD PRESSURE: 90 MMHG | SYSTOLIC BLOOD PRESSURE: 150 MMHG | BODY MASS INDEX: 27.83 KG/M2

## 2024-12-12 DIAGNOSIS — N39.41 URGE INCONTINENCE OF URINE: Primary | ICD-10-CM

## 2024-12-12 RX ORDER — VIBEGRON 75 MG/1
75 TABLET, FILM COATED ORAL DAILY
Qty: 42 TABLET | Refills: 0 | COMMUNITY
Start: 2024-12-12

## 2024-12-12 NOTE — PROGRESS NOTES
PNE Lead Removal     Patient Name: José Miguel Jerry  : 1950   MRN: [id number]     Chief Complaint:   Chief Complaint   Patient presents with    Follow-up     Lead removal        History of Present Illness: José Miguel Jerry is a 74 y.o. here for PNE lead removal   Patient is not convenient with symptom improvement that he wants to proceed with implant  He changed his pad 2-3 times daily instead of the 1 time he reported   Detrol caused severe dry mouth  Myrbetriq taking now still has refractory urge incontinence      Subjective      Review of System:   As noted in HPI     Past Medical History:   Past Medical History:   Diagnosis Date    Abnormal stress test 2018    1. MPS 18: Positive EKG changes Reversible Inferior ischemia EF 60%      Depression     Dyspnea on exertion     Elevated cholesterol     Erectile dysfunction     History of echocardiogram 2018    History of exercise stress test 2018    History of pneumonia 2019    Reports 40+ years ago    Hormone disorder 2013    Hypogonadism    Hypertension     Prostate cancer 2012    Seasonal allergies     reports mild    Sleep apnea     Uses CPAP HS - instructed to bring in DOS    Urinary incontinence 2024    Wears glasses        Past Surgical History:   Past Surgical History:   Procedure Laterality Date    APPENDECTOMY      BLEPHAROPLASTY Bilateral     CARDIAC CATHETERIZATION N/A 2018    Procedure: Left Heart Cath;  Surgeon: Anastacia Gary MD;  Location:  [facility];  Service: Cardiology    CARDIOVASCULAR STRESS TEST  2021    CATARACT EXTRACTION, BILATERAL      CHOLECYSTECTOMY N/A 2021    Procedure: CHOLECYSTECTOMY LAPAROSCOPIC;  Surgeon: Caleb Edmond MD;  Location:  [facility] OR;  Service: General;  Laterality: N/A;    COLONOSCOPY      HEAD/NECK LESION/CYST EXCISION Right 10/31/2016    Procedure: EXCISE BCCA RIGHT SCALP;  Surgeon: Lowell Rider MD;  Location:  [facility];  Service:      NASAL SEPTUM SURGERY      PENILE PROSTHESIS IMPLANT N/A 11/28/2018    Procedure: PENILE PROSTHESIS PLACEMENT;  Surgeon: Nick Sommer MD;  Location: [facility] OR;  Service: Urology    PROSTATE SURGERY      SKIN BIOPSY      patient reports basal cell carcinoma removed from head x3 places    TEETH EXTRACTION         Family History:   Family History   Problem Relation Age of Onset    Hypertension Mother     Heart disease Mother         A fib    Kidney disease Father         Alcohol abuse    Liver disease Father     Heart disease Father     Hypertension Father     Heart attack Sister         Atrial fibrillation    Heart attack Brother     Heart disease Maternal Grandfather         Enlarged heart    Hypertension Maternal Grandfather     Heart attack Maternal Grandfather     Heart disease Paternal Grandfather     Hypertension Paternal Grandfather         Hypertension    Heart attack Paternal Grandfather     Heart disease Daughter        Social History:   Social History     Socioeconomic History    Marital status:    Tobacco Use    Smoking status: Never     Passive exposure: Never    Smokeless tobacco: Never   Vaping Use    Vaping status: Never Used   Substance and Sexual Activity    Alcohol use: Yes     Alcohol/week: 2.0 standard drinks of alcohol     Types: 1 Glasses of wine, 1 Cans of beer per week     Comment: Social use, reports no HX of abuse    Drug use: Never    Sexual activity: Yes     Partners: Female     Birth control/protection: None       Medications:     Current Outpatient Medications:     aspirin 81 MG EC tablet, Take 1 tablet by mouth Daily., Disp: , Rfl:     Azelastine HCl 137 MCG/SPRAY solution, USE 1 SPRAY IN EACH NOSTRIL AS DIRECTED  TWICE A DAY AS NEEDED FOR RHINITIS OR ALLERGIES AS DIRECTED., Disp: 90 mL, Rfl: 2    benazepril (LOTENSIN) 40 MG tablet, Take 1 tablet by mouth Daily., Disp: , Rfl:     esomeprazole (nexIUM) 20 MG capsule, Take 1 capsule by mouth As Needed., Disp: , Rfl:     Gas  "Relief 80 MG chewable tablet, 1 tablet after meals and at bedtime Orally Four times a day, Disp: , Rfl:     hydrochlorothiazide (MICROZIDE) 12.5 MG capsule, Take 1 capsule by mouth Every Morning., Disp: , Rfl:     ISOtretinoin (ACCUTANE) 40 MG capsule, Take 1 capsule by mouth Every 12 (Twelve) Hours., Disp: , Rfl:     Lifitegrast (XIIDRA OP), Apply 1 drop to eye(s) as directed by provider 2 (Two) Times a Day., Disp: , Rfl:     metoprolol succinate XL (TOPROL-XL) 25 MG 24 hr tablet, Take 1 tablet by mouth Daily., Disp: , Rfl:     metroNIDAZOLE (METROGEL) 0.75 % gel, Apply 1 application topically to the appropriate area as directed Daily., Disp: , Rfl:     montelukast (SINGULAIR) 10 MG tablet, Take 1 tablet by mouth Every Night., Disp: 90 tablet, Rfl: 2    Multiple Vitamins-Minerals (MULTIVITAMIN ADULTS PO), Take 1 tablet by mouth Daily., Disp: , Rfl:     mupirocin (BACTROBAN) 2 % ointment, Apply 1 Application topically to the appropriate area as directed 2 (Two) Times a Day. Apply topically to the affected area twice daily, Disp: , Rfl:     nortriptyline (PAMELOR) 10 MG capsule, Take 3 capsules by mouth Every Night., Disp: , Rfl:     simvastatin (ZOCOR) 20 MG tablet, Take 1 tablet by mouth Every Night., Disp: , Rfl:     venlafaxine XR (EFFEXOR-XR) 150 MG 24 hr capsule, Take 1 capsule by mouth Daily., Disp: , Rfl:     Vibegron (Gemtesa) 75 MG tablet, Take 1 tablet by mouth Daily., Disp: 42 tablet, Rfl: 0    Allergies:   Allergies   Allergen Reactions    Chlorhexidine Rash    Clindamycin/Lincomycin Rash     itching    Neosporin [Bacitracin-Polymyxin B] Itching and Rash       Objective     Physical Exam:   Vital Signs:   Visit Vitals  /90 (BP Location: Right arm, Patient Position: Sitting, Cuff Size: Adult)   Pulse 70   Temp 97.2 °F (36.2 °C) (Temporal)   Ht 185.4 cm (72.99\")   Wt 95.3 kg (210 lb)   SpO2 98%   BMI 27.71 kg/m²      Body mass index is 27.71 kg/m².     Sacrum: leads CDI        Assessment / Plan  "     Assessment/Plan:   Diagnoses and all orders for this visit:    1. Urge incontinence of urine (Primary)  -     Vibegron (Gemtesa) 75 MG tablet; Take 1 tablet by mouth Daily.  Dispense: 42 tablet; Refill: 0     73 yo male here to follow up with PNE lead removal.  Patient has decided he is not ready to proceed with InterStim stage I and II implant.  He wishes to do a trial of conservative therapies with pelvic floor physical therapy and change of medication from Myrbetriq to Gemtesa.    Battery disconnected, leads removed intact, no notable bleeding, 2x2 bandage with paper tape placed over site.  Patient can return to normal activity and resume showering  Greater than 50 % improvement in symptoms achieved during trial  Awake voids decreased 8->6  Sleep voids decreased 2->1  Leaks in 24 hs decreased 6->1  Pads in 24 hrs decreased 4->1    Stop Myrbetriq  Start Gemtesa 75 mg by mouth 1 time daily  Referral to PFPT    Keep next scheduled follow-up     ERENDIRA Calderon, NP-C  [facility]

## 2024-12-18 DIAGNOSIS — N39.3 SUI (STRESS URINARY INCONTINENCE), MALE: Primary | ICD-10-CM

## 2025-01-29 ENCOUNTER — TELEPHONE (OUTPATIENT)
Dept: CARDIOLOGY | Facility: CLINIC | Age: 75
End: 2025-01-29
Payer: MEDICARE

## 2025-01-29 NOTE — LETTER
01/30/25          José Miguel Jerry, 1950  Procedure/Surgery - Partial Knee Replacement        To Whom It May Concern,       José Miguel E Danieltwan, 1950, At last cardiac visit patient was stable and free of significant cardiac symptoms, his previous workup has been negative, okay to proceed to surgery without need of further investigation. Any questions please call [phone].          Sincerely,          MD Marya Mann RN

## 2025-01-29 NOTE — TELEPHONE ENCOUNTER
[facility] is requesting cardiac clearance for partial knee replacement scheduled on 5/20/25. LOV: 8/13/24.   Please advise.

## 2025-02-27 ENCOUNTER — LAB (OUTPATIENT)
Dept: LAB | Facility: HOSPITAL | Age: 75
End: 2025-02-27
Payer: MEDICARE

## 2025-02-27 ENCOUNTER — CONSULT (OUTPATIENT)
Dept: ONCOLOGY | Facility: CLINIC | Age: 75
End: 2025-02-27
Payer: MEDICARE

## 2025-02-27 VITALS
WEIGHT: 222.4 LBS | TEMPERATURE: 97.5 F | DIASTOLIC BLOOD PRESSURE: 92 MMHG | SYSTOLIC BLOOD PRESSURE: 154 MMHG | RESPIRATION RATE: 16 BRPM | OXYGEN SATURATION: 96 % | HEART RATE: 98 BPM | BODY MASS INDEX: 29.48 KG/M2 | HEIGHT: 73 IN

## 2025-02-27 DIAGNOSIS — D75.1 POLYCYTHEMIA: ICD-10-CM

## 2025-02-27 DIAGNOSIS — C61 PROSTATE CANCER: Primary | ICD-10-CM

## 2025-02-27 LAB
ALBUMIN SERPL-MCNC: 4 G/DL (ref 3.5–5.2)
ALBUMIN/GLOB SERPL: 1.5 G/DL
ALP SERPL-CCNC: 85 U/L (ref 39–117)
ALT SERPL W P-5'-P-CCNC: 33 U/L (ref 1–41)
ANION GAP SERPL CALCULATED.3IONS-SCNC: 9.4 MMOL/L (ref 5–15)
AST SERPL-CCNC: 26 U/L (ref 1–40)
BASOPHILS # BLD AUTO: 0.01 10*3/MM3 (ref 0–0.2)
BASOPHILS NFR BLD AUTO: 0.2 % (ref 0–1.5)
BILIRUB SERPL-MCNC: 0.4 MG/DL (ref 0–1.2)
BUN SERPL-MCNC: 12 MG/DL (ref 8–23)
BUN/CREAT SERPL: 12 (ref 7–25)
CALCIUM SPEC-SCNC: 9.2 MG/DL (ref 8.6–10.5)
CHLORIDE SERPL-SCNC: 100 MMOL/L (ref 98–107)
CO2 SERPL-SCNC: 29.6 MMOL/L (ref 22–29)
CREAT SERPL-MCNC: 1 MG/DL (ref 0.76–1.27)
DEPRECATED RDW RBC AUTO: 51.3 FL (ref 37–54)
EGFRCR SERPLBLD CKD-EPI 2021: 79 ML/MIN/1.73
EOSINOPHIL # BLD AUTO: 0.05 10*3/MM3 (ref 0–0.4)
EOSINOPHIL NFR BLD AUTO: 0.8 % (ref 0.3–6.2)
ERYTHROCYTE [DISTWIDTH] IN BLOOD BY AUTOMATED COUNT: 16.2 % (ref 12.3–15.4)
GLOBULIN UR ELPH-MCNC: 2.6 GM/DL
GLUCOSE SERPL-MCNC: 103 MG/DL (ref 65–99)
HCT VFR BLD AUTO: 57.5 % (ref 37.5–51)
HGB BLD-MCNC: 18.9 G/DL (ref 13–17.7)
IMM GRANULOCYTES # BLD AUTO: 0.02 10*3/MM3 (ref 0–0.05)
IMM GRANULOCYTES NFR BLD AUTO: 0.3 % (ref 0–0.5)
LYMPHOCYTES # BLD AUTO: 1.12 10*3/MM3 (ref 0.7–3.1)
LYMPHOCYTES NFR BLD AUTO: 18.8 % (ref 19.6–45.3)
MCH RBC QN AUTO: 29.6 PG (ref 26.6–33)
MCHC RBC AUTO-ENTMCNC: 32.9 G/DL (ref 31.5–35.7)
MCV RBC AUTO: 90 FL (ref 79–97)
MONOCYTES # BLD AUTO: 0.79 10*3/MM3 (ref 0.1–0.9)
MONOCYTES NFR BLD AUTO: 13.3 % (ref 5–12)
NEUTROPHILS NFR BLD AUTO: 3.97 10*3/MM3 (ref 1.7–7)
NEUTROPHILS NFR BLD AUTO: 66.6 % (ref 42.7–76)
NRBC BLD AUTO-RTO: 0 /100 WBC (ref 0–0.2)
PLATELET # BLD AUTO: 168 10*3/MM3 (ref 140–450)
PMV BLD AUTO: 10.7 FL (ref 6–12)
POTASSIUM SERPL-SCNC: 4 MMOL/L (ref 3.5–5.2)
PROT SERPL-MCNC: 6.6 G/DL (ref 6–8.5)
RBC # BLD AUTO: 6.39 10*6/MM3 (ref 4.14–5.8)
SODIUM SERPL-SCNC: 139 MMOL/L (ref 136–145)
WBC NRBC COR # BLD AUTO: 5.96 10*3/MM3 (ref 3.4–10.8)

## 2025-02-27 PROCEDURE — 99204 OFFICE O/P NEW MOD 45 MIN: CPT | Performed by: INTERNAL MEDICINE

## 2025-02-27 PROCEDURE — 36415 COLL VENOUS BLD VENIPUNCTURE: CPT

## 2025-02-27 PROCEDURE — 3080F DIAST BP >= 90 MM HG: CPT | Performed by: INTERNAL MEDICINE

## 2025-02-27 PROCEDURE — 82668 ASSAY OF ERYTHROPOIETIN: CPT

## 2025-02-27 PROCEDURE — 3077F SYST BP >= 140 MM HG: CPT | Performed by: INTERNAL MEDICINE

## 2025-02-27 PROCEDURE — 1126F AMNT PAIN NOTED NONE PRSNT: CPT | Performed by: INTERNAL MEDICINE

## 2025-02-27 PROCEDURE — 80053 COMPREHEN METABOLIC PANEL: CPT

## 2025-02-27 PROCEDURE — 85025 COMPLETE CBC W/AUTO DIFF WBC: CPT

## 2025-02-27 RX ORDER — LEVOTHYROXINE SODIUM 50 UG/1
50 TABLET ORAL EVERY MORNING
COMMUNITY
Start: 2025-02-11

## 2025-02-27 RX ORDER — VENLAFAXINE HYDROCHLORIDE 225 MG/1
TABLET, EXTENDED RELEASE ORAL
COMMUNITY
Start: 2025-01-14

## 2025-02-27 NOTE — PROGRESS NOTES
New Patient Office Visit      Date: 2025     Patient Name: José Miguel Jerry  MRN: [id number]  : 1950  Referring Physician: Nick Sommer    Chief Complaint: Establish care for polycythemia    History of Present Illness: José Miguel Jerry is a pleasant 74 y.o. male with a past medical history of prostate cancer status post robotic prostatectomy in , hypogonadism, obstructive sleep apnea, hypothyroidism, hypertension, hyperlipidemia, CAD who presents today for evaluation of polycythemia. The patient is currently undergoing testosterone replacement therapy with Dr. Sommer.  His hemoglobin has been ranging between 17-19 over the past several lab draws.  He is currently getting [place]bot[place] near [place] every 4-6 weeks.  Today he denies any headaches, vision changes, chest pain, shortness of breath.  Denies any smoking history.  Does note a history of sleep apnea for which he uses a CPAP.  Denies any family history of any myeloproliferative disorder requiring phlebotomy.  He otherwise feels well and has no major concerns or complaints.  Has not required any therapy since his prostatectomy for his prostate cancer since .  PSAs have been undetectable since that time    Oncology History:    Oncology/Hematology History    No history exists.       Subjective      Review of Systems:     Constitutional: Negative for fevers, chills, or weight loss  Eyes: Negative for blurred vision or discharge         Ear/Nose/Throat: Negative for difficulty swallowing, sore throat, LAD                                                       Respiratory: Negative for cough, SOA, wheezing                                                                                        Cardiovascular: Negative for chest pain or palpitations                                                                  Gastrointestinal: Negative for nausea, vomiting or diarrhea                                                                      Genitourinary: Negative for dysuria or hematuria                                                                                           Musculoskeletal: Negative for any joint pains or muscle aches                                                                        Neurologic: Negative for any weakness, headaches, dizziness                                                                         Hematologic: Negative for any easy bleeding or bruising                                                                                   Psychiatric: Negative for anxiety or depression                             Past Medical History:   Past Medical History:   Diagnosis Date    Abnormal stress test 08/14/2018    1. MPS 8-8-18: Positive EKG changes Reversible Inferior ischemia EF 60%      Depression     Dyspnea on exertion     Elevated cholesterol     Erectile dysfunction     History of echocardiogram 08/2018    History of exercise stress test 08/2018    History of pneumonia 03/21/2019    Reports 40+ years ago    Hormone disorder 2013    Hypogonadism    Hypertension     Prostate cancer 2012    Seasonal allergies     reports mild    Sleep apnea     Uses CPAP HS - instructed to bring in DOS    Urinary incontinence June 2024    Wears glasses        Past Surgical History:   Past Surgical History:   Procedure Laterality Date    APPENDECTOMY      BLEPHAROPLASTY Bilateral     CARDIAC CATHETERIZATION N/A 08/20/2018    Procedure: Left Heart Cath;  Surgeon: Anastacia Gary MD;  Location:  [facility];  Service: Cardiology    CARDIOVASCULAR STRESS TEST  12/14/2021    CATARACT EXTRACTION, BILATERAL      CHOLECYSTECTOMY N/A 12/29/2021    Procedure: CHOLECYSTECTOMY LAPAROSCOPIC;  Surgeon: Caleb Edmond MD;  Location: [facility] OR;  Service: General;  Laterality: N/A;    COLONOSCOPY      HEAD/NECK LESION/CYST EXCISION Right 10/31/2016    Procedure: EXCISE BCCA RIGHT SCALP;  Surgeon: Lowell Rider MD;  Location:   [facility] OR;  Service:     NASAL SEPTUM SURGERY      PENILE PROSTHESIS IMPLANT N/A 11/28/2018    Procedure: PENILE PROSTHESIS PLACEMENT;  Surgeon: Nick Sommer MD;  Location: [facility] OR;  Service: Urology    PROSTATE SURGERY      SKIN BIOPSY      patient reports basal cell carcinoma removed from head x3 places    TEETH EXTRACTION         Family History:   Family History   Problem Relation Age of Onset    Hypertension Mother     Heart disease Mother         A fib    Kidney disease Father         Alcohol abuse    Liver disease Father     Heart disease Father     Hypertension Father     Heart attack Sister         Atrial fibrillation    Heart attack Brother     Heart disease Maternal Grandfather         Enlarged heart    Hypertension Maternal Grandfather     Heart attack Maternal Grandfather     Heart disease Paternal Grandfather     Hypertension Paternal Grandfather         Hypertension    Heart attack Paternal Grandfather     Heart disease Daughter        Social History:   Social History     Socioeconomic History    Marital status:    Tobacco Use    Smoking status: Never     Passive exposure: Never    Smokeless tobacco: Never   Vaping Use    Vaping status: Never Used   Substance and Sexual Activity    Alcohol use: Yes     Alcohol/week: 2.0 standard drinks of alcohol     Types: 1 Glasses of wine, 1 Cans of beer per week     Comment: Social use, reports no HX of abuse    Drug use: Never    Sexual activity: Yes     Partners: Female     Birth control/protection: None       Medications:     Current Outpatient Medications:     aspirin 81 MG EC tablet, Take 1 tablet by mouth Daily., Disp: , Rfl:     Azelastine HCl 137 MCG/SPRAY solution, USE 1 SPRAY IN EACH NOSTRIL AS DIRECTED  TWICE A DAY AS NEEDED FOR RHINITIS OR ALLERGIES AS DIRECTED., Disp: 90 mL, Rfl: 2    benazepril (LOTENSIN) 40 MG tablet, Take 1 tablet by mouth Daily., Disp: , Rfl:     esomeprazole (nexIUM) 20 MG capsule, Take 1 capsule by mouth As  Needed., Disp: , Rfl:     Gas Relief 80 MG chewable tablet, 1 tablet after meals and at bedtime Orally Four times a day, Disp: , Rfl:     hydrochlorothiazide (MICROZIDE) 12.5 MG capsule, Take 1 capsule by mouth Every Morning., Disp: , Rfl:     ISOtretinoin (ACCUTANE) 40 MG capsule, Take 1 capsule by mouth Every 12 (Twelve) Hours., Disp: , Rfl:     levothyroxine (SYNTHROID, LEVOTHROID) 50 MCG tablet, Take 1 tablet by mouth Every Morning., Disp: , Rfl:     Lifitegrast (XIIDRA OP), Apply 1 drop to eye(s) as directed by provider 2 (Two) Times a Day., Disp: , Rfl:     metoprolol succinate XL (TOPROL-XL) 25 MG 24 hr tablet, Take 1 tablet by mouth Daily., Disp: , Rfl:     metroNIDAZOLE (METROGEL) 0.75 % gel, Apply 1 application topically to the appropriate area as directed Daily., Disp: , Rfl:     montelukast (SINGULAIR) 10 MG tablet, Take 1 tablet by mouth Every Night., Disp: 90 tablet, Rfl: 2    Multiple Vitamins-Minerals (MULTIVITAMIN ADULTS PO), Take 1 tablet by mouth Daily., Disp: , Rfl:     mupirocin (BACTROBAN) 2 % ointment, Apply 1 Application topically to the appropriate area as directed 2 (Two) Times a Day. Apply topically to the affected area twice daily, Disp: , Rfl:     nortriptyline (PAMELOR) 10 MG capsule, Take 3 capsules by mouth Every Night., Disp: , Rfl:     simvastatin (ZOCOR) 20 MG tablet, Take 1 tablet by mouth Every Night., Disp: , Rfl:     venlafaxine 225 MG tablet sustained-release 24 hour 24 hr tablet, 1 tablet with food Orally Once a day STOP Caps Change to Tabs 30 days, Disp: , Rfl:     Vibegron (Gemtesa) 75 MG tablet, Take 1 tablet by mouth Daily., Disp: 42 tablet, Rfl: 0    Allergies:   Allergies   Allergen Reactions    Chlorhexidine Rash    Clindamycin/Lincomycin Rash     itching    Neosporin [Bacitracin-Polymyxin B] Itching and Rash       Objective     Physical Exam:  Vital Signs:   Vitals:    02/27/25 1105   BP: 154/92   Pulse: 98   Resp: 16   Temp: 97.5 °F (36.4 °C)   SpO2: 96%   Weight:  "101 kg (222 lb 6.4 oz)   Height: 185.4 cm (72.99\")   PainSc: 0-No pain     Pain Score    02/27/25 1105   PainSc: 0-No pain     ECOG Performance Status: 0 - Asymptomatic    Constitutional: NAD, ECOG 0  Eyes: PERRLA, scleral anicteric  ENT: No LAD, no thyromegaly  Respiratory: CTAB, no wheezing, rales, rhonchi  Cardiovascular: RRR, no murmurs, pulses 2+ bilaterally  Abdomen: soft, NT/ND, no HSM  Musculoskeletal: strength 5/5 bilaterally, no c/c/e  Neurologic: A&O x 3, CN II-XII intact grossly  Psych: mood and affect congruent, no SI or HI    Results Review:   Lab on 02/27/2025   Component Date Value Ref Range Status    WBC 02/27/2025 5.96  3.40 - 10.80 10*3/mm3 Final    RBC 02/27/2025 6.39 (H)  4.14 - 5.80 10*6/mm3 Final    Hemoglobin 02/27/2025 18.9 (H)  13.0 - 17.7 g/dL Final    Hematocrit 02/27/2025 57.5 (H)  37.5 - 51.0 % Final    MCV 02/27/2025 90.0  79.0 - 97.0 fL Final    MCH 02/27/2025 29.6  26.6 - 33.0 pg Final    MCHC 02/27/2025 32.9  31.5 - 35.7 g/dL Final    RDW 02/27/2025 16.2 (H)  12.3 - 15.4 % Final    RDW-SD 02/27/2025 51.3  37.0 - 54.0 fl Final    MPV 02/27/2025 10.7  6.0 - 12.0 fL Final    Platelets 02/27/2025 168  140 - 450 10*3/mm3 Final    Neutrophil % 02/27/2025 66.6  42.7 - 76.0 % Final    Lymphocyte % 02/27/2025 18.8 (L)  19.6 - 45.3 % Final    Monocyte % 02/27/2025 13.3 (H)  5.0 - 12.0 % Final    Eosinophil % 02/27/2025 0.8  0.3 - 6.2 % Final    Basophil % 02/27/2025 0.2  0.0 - 1.5 % Final    Immature Grans % 02/27/2025 0.3  0.0 - 0.5 % Final    Neutrophils, Absolute 02/27/2025 3.97  1.70 - 7.00 10*3/mm3 Final    Lymphocytes, Absolute 02/27/2025 1.12  0.70 - 3.10 10*3/mm3 Final    Monocytes, Absolute 02/27/2025 0.79  0.10 - 0.90 10*3/mm3 Final    Eosinophils, Absolute 02/27/2025 0.05  0.00 - 0.40 10*3/mm3 Final    Basophils, Absolute 02/27/2025 0.01  0.00 - 0.20 10*3/mm3 Final    Immature Grans, Absolute 02/27/2025 0.02  0.00 - 0.05 10*3/mm3 Final    nRBC 02/27/2025 0.0  0.0 - 0.2 /100 WBC " Final       No results found.    Assessment / Plan      Assessment/Plan:   1. Prostate cancer (Primary)  -Status post prostatectomy in 2013  -PSA undetectable since and followed by Dr. Sommer    2. Polycythemia  -Hemoglobin ranging between 17-19 over the past several lab draws  -Likely secondary due to exogenous testosterone and history of sleep apnea  -Currently receiving therapeutic phlebotomies every 4-6 weeks  -Will check labs as below  -We will need to consider increasing his phlebotomy to every 3-4 weeks based off results  -     CBC & Differential; Future  -     Comprehensive Metabolic Panel; Future  -     Cancel: JAK2 [id number] ([facility]); Future  -     Erythropoietin; Future           Follow Up:   Follow-up pending lab results     Krystian Treviño MD  Hematology and Oncology     Please note that portions of this note may have been completed with a voice recognition program. Efforts were made to edit the dictations, but occasionally words are mistranscribed.

## 2025-02-28 LAB — EPO SERPL-ACNC: 37.1 MIU/ML (ref 2.6–18.5)

## 2025-03-03 ENCOUNTER — LAB (OUTPATIENT)
Dept: LAB | Facility: HOSPITAL | Age: 75
End: 2025-03-03
Payer: MEDICARE

## 2025-03-03 ENCOUNTER — TELEPHONE (OUTPATIENT)
Dept: ONCOLOGY | Facility: CLINIC | Age: 75
End: 2025-03-03
Payer: MEDICARE

## 2025-03-03 PROCEDURE — 36415 COLL VENOUS BLD VENIPUNCTURE: CPT

## 2025-03-03 NOTE — TELEPHONE ENCOUNTER
Call to José Miguel.  Offered to fax lab orders to [facility] but José Miguel reports he is on his way now to [place] to pick something up anyway, so will just have his labs collected when he comes.

## 2025-03-04 ENCOUNTER — TELEPHONE (OUTPATIENT)
Dept: UROLOGY | Facility: CLINIC | Age: 75
End: 2025-03-04
Payer: MEDICARE

## 2025-03-04 NOTE — TELEPHONE ENCOUNTER
Juana from [facility] called to get clearance for patient surgery on March 12, 2025 with . Per Juana they need to know if he needs to hold any urology medications that he is on?       Juana - [facility]  [phone] Ext [phone]  [phone] fax

## 2025-03-05 NOTE — TELEPHONE ENCOUNTER
Ok, great I  will call and let [facility] know and type up something for you to sign, it will be on your desk.

## 2025-03-11 LAB
CITATION REF LAB TEST: NORMAL
JAK2 GENE MUT ANL BLD/T: NORMAL
JAK2 GENE MUT ANL BLD/T: NORMAL
LAB DIRECTOR NAME PROVIDER: NORMAL
REF LAB TEST METHOD: NORMAL
REFLEX: NORMAL
SPECIMEN TYPE: NORMAL
TEST PERFORMANCE INFO SPEC: NORMAL

## 2025-03-12 ENCOUNTER — APPOINTMENT (OUTPATIENT)
Dept: GENERAL RADIOLOGY | Facility: HOSPITAL | Age: 75
DRG: 177 | End: 2025-03-12
Payer: MEDICARE

## 2025-03-12 ENCOUNTER — HOSPITAL ENCOUNTER (INPATIENT)
Facility: HOSPITAL | Age: 75
LOS: 2 days | Discharge: HOME HEALTH | DRG: 177 | End: 2025-03-14
Attending: STUDENT IN AN ORGANIZED HEALTH CARE EDUCATION/TRAINING PROGRAM | Admitting: FAMILY MEDICINE
Payer: MEDICARE

## 2025-03-12 ENCOUNTER — APPOINTMENT (OUTPATIENT)
Dept: CT IMAGING | Facility: HOSPITAL | Age: 75
DRG: 177 | End: 2025-03-12
Payer: MEDICARE

## 2025-03-12 DIAGNOSIS — R09.02 POSTOPERATIVE HYPOXIA: ICD-10-CM

## 2025-03-12 DIAGNOSIS — J69.0 ASPIRATION PNEUMONIA OF RIGHT MIDDLE LOBE, UNSPECIFIED ASPIRATION PNEUMONIA TYPE: ICD-10-CM

## 2025-03-12 DIAGNOSIS — Z98.890 POSTOPERATIVE HYPOXIA: ICD-10-CM

## 2025-03-12 DIAGNOSIS — E87.20 LACTIC ACIDEMIA: ICD-10-CM

## 2025-03-12 DIAGNOSIS — J96.01 ACUTE RESPIRATORY FAILURE WITH HYPOXIA: Primary | ICD-10-CM

## 2025-03-12 DIAGNOSIS — G47.33 OBSTRUCTIVE SLEEP APNEA SYNDROME: ICD-10-CM

## 2025-03-12 PROBLEM — G47.30 SLEEP APNEA: Status: ACTIVE | Noted: 2025-03-12

## 2025-03-12 PROBLEM — Z85.46 HISTORY OF PROSTATE CANCER: Status: ACTIVE | Noted: 2025-03-12

## 2025-03-12 LAB
ALBUMIN SERPL-MCNC: 3.7 G/DL (ref 3.5–5.2)
ALBUMIN/GLOB SERPL: 1.6 G/DL
ALP SERPL-CCNC: 84 U/L (ref 39–117)
ALT SERPL W P-5'-P-CCNC: 30 U/L (ref 1–41)
ANION GAP SERPL CALCULATED.3IONS-SCNC: 15 MMOL/L (ref 5–15)
AST SERPL-CCNC: 26 U/L (ref 1–40)
ATMOSPHERIC PRESS: ABNORMAL MM[HG]
BASE EXCESS BLDV CALC-SCNC: 0.1 MMOL/L (ref -2–2)
BASOPHILS # BLD AUTO: 0.01 10*3/MM3 (ref 0–0.2)
BASOPHILS NFR BLD AUTO: 0.1 % (ref 0–1.5)
BDY SITE: ABNORMAL
BILIRUB SERPL-MCNC: 0.5 MG/DL (ref 0–1.2)
BILIRUB UR QL STRIP: NEGATIVE
BODY TEMPERATURE: 37
BUN SERPL-MCNC: 18 MG/DL (ref 8–23)
BUN/CREAT SERPL: 15.9 (ref 7–25)
CALCIUM SPEC-SCNC: 8.4 MG/DL (ref 8.6–10.5)
CHLORIDE SERPL-SCNC: 100 MMOL/L (ref 98–107)
CLARITY UR: CLEAR
CO2 BLDA-SCNC: 26.5 MMOL/L (ref 22–33)
CO2 SERPL-SCNC: 23 MMOL/L (ref 22–29)
COHGB MFR BLD: 1.3 %
COLOR UR: YELLOW
CREAT SERPL-MCNC: 1.13 MG/DL (ref 0.76–1.27)
CRP SERPL-MCNC: <0.3 MG/DL (ref 0–0.5)
D DIMER PPP FEU-MCNC: 2.39 MCGFEU/ML (ref 0–0.74)
D-LACTATE SERPL-SCNC: 3.9 MMOL/L (ref 0.5–2)
D-LACTATE SERPL-SCNC: 4.6 MMOL/L (ref 0.5–2)
DEPRECATED RDW RBC AUTO: 51.4 FL (ref 37–54)
EGFRCR SERPLBLD CKD-EPI 2021: 68.2 ML/MIN/1.73
EOSINOPHIL # BLD AUTO: 0 10*3/MM3 (ref 0–0.4)
EOSINOPHIL NFR BLD AUTO: 0 % (ref 0.3–6.2)
EPAP: 0
ERYTHROCYTE [DISTWIDTH] IN BLOOD BY AUTOMATED COUNT: 15.6 % (ref 12.3–15.4)
FLUAV RNA RESP QL NAA+PROBE: NOT DETECTED
FLUBV RNA RESP QL NAA+PROBE: NOT DETECTED
GEN 5 1HR TROPONIN T REFLEX: 10 NG/L
GLOBULIN UR ELPH-MCNC: 2.3 GM/DL
GLUCOSE SERPL-MCNC: 161 MG/DL (ref 65–99)
GLUCOSE UR STRIP-MCNC: NEGATIVE MG/DL
HCO3 BLDV-SCNC: 25.2 MMOL/L (ref 22–28)
HCT VFR BLD AUTO: 56.1 % (ref 37.5–51)
HGB BLD-MCNC: 18.1 G/DL (ref 13–17.7)
HGB BLDA-MCNC: 18.3 G/DL (ref 13.5–17.5)
HGB UR QL STRIP.AUTO: NEGATIVE
HOLD SPECIMEN: NORMAL
IMM GRANULOCYTES # BLD AUTO: 0.05 10*3/MM3 (ref 0–0.05)
IMM GRANULOCYTES NFR BLD AUTO: 0.5 % (ref 0–0.5)
INHALED O2 CONCENTRATION: 21 %
IPAP: 0
KETONES UR QL STRIP: NEGATIVE
LEUKOCYTE ESTERASE UR QL STRIP.AUTO: NEGATIVE
LYMPHOCYTES # BLD AUTO: 0.29 10*3/MM3 (ref 0.7–3.1)
LYMPHOCYTES NFR BLD AUTO: 2.8 % (ref 19.6–45.3)
MAGNESIUM SERPL-MCNC: 1.8 MG/DL (ref 1.6–2.4)
MCH RBC QN AUTO: 29.2 PG (ref 26.6–33)
MCHC RBC AUTO-ENTMCNC: 32.3 G/DL (ref 31.5–35.7)
MCV RBC AUTO: 90.5 FL (ref 79–97)
METHGB BLD QL: 0 %
MODALITY: ABNORMAL
MONOCYTES # BLD AUTO: 0.24 10*3/MM3 (ref 0.1–0.9)
MONOCYTES NFR BLD AUTO: 2.3 % (ref 5–12)
NEUTROPHILS NFR BLD AUTO: 9.94 10*3/MM3 (ref 1.7–7)
NEUTROPHILS NFR BLD AUTO: 94.3 % (ref 42.7–76)
NITRITE UR QL STRIP: NEGATIVE
NRBC BLD AUTO-RTO: 0 /100 WBC (ref 0–0.2)
NT-PROBNP SERPL-MCNC: 37.7 PG/ML (ref 0–900)
OXYHGB MFR BLDV: 91.2 %
PAW @ PEAK INSP FLOW SETTING VENT: 0 CMH2O
PCO2 BLDV: 41.5 MM HG (ref 41–51)
PH BLDV: 7.39 PH UNITS (ref 7.31–7.41)
PH UR STRIP.AUTO: 5.5 [PH] (ref 5–8)
PLATELET # BLD AUTO: 166 10*3/MM3 (ref 140–450)
PMV BLD AUTO: 10.3 FL (ref 6–12)
PO2 BLDV: 63.8 MM HG (ref 27–53)
POTASSIUM SERPL-SCNC: 3.9 MMOL/L (ref 3.5–5.2)
PROCALCITONIN SERPL-MCNC: 0.03 NG/ML (ref 0–0.25)
PROT SERPL-MCNC: 6 G/DL (ref 6–8.5)
PROT UR QL STRIP: NEGATIVE
RBC # BLD AUTO: 6.2 10*6/MM3 (ref 4.14–5.8)
RSV RNA RESP QL NAA+PROBE: NOT DETECTED
SARS-COV-2 RNA RESP QL NAA+PROBE: NOT DETECTED
SODIUM SERPL-SCNC: 138 MMOL/L (ref 136–145)
SP GR UR STRIP: >=1.03 (ref 1–1.03)
TOTAL RATE: 0 BREATHS/MINUTE
TROPONIN T NUMERIC DELTA: -1 NG/L
TROPONIN T SERPL HS-MCNC: 11 NG/L
UROBILINOGEN UR QL STRIP: NORMAL
WBC NRBC COR # BLD AUTO: 10.53 10*3/MM3 (ref 3.4–10.8)
WHOLE BLOOD HOLD COAG: NORMAL
WHOLE BLOOD HOLD SPECIMEN: NORMAL

## 2025-03-12 PROCEDURE — 99223 1ST HOSP IP/OBS HIGH 75: CPT | Performed by: NURSE PRACTITIONER

## 2025-03-12 PROCEDURE — 25510000001 IOPAMIDOL PER 1 ML: Performed by: STUDENT IN AN ORGANIZED HEALTH CARE EDUCATION/TRAINING PROGRAM

## 2025-03-12 PROCEDURE — 25810000003 SODIUM CHLORIDE 0.9 % SOLUTION: Performed by: STUDENT IN AN ORGANIZED HEALTH CARE EDUCATION/TRAINING PROGRAM

## 2025-03-12 PROCEDURE — 82805 BLOOD GASES W/O2 SATURATION: CPT

## 2025-03-12 PROCEDURE — 83605 ASSAY OF LACTIC ACID: CPT | Performed by: STUDENT IN AN ORGANIZED HEALTH CARE EDUCATION/TRAINING PROGRAM

## 2025-03-12 PROCEDURE — 99285 EMERGENCY DEPT VISIT HI MDM: CPT

## 2025-03-12 PROCEDURE — 71275 CT ANGIOGRAPHY CHEST: CPT

## 2025-03-12 PROCEDURE — 80053 COMPREHEN METABOLIC PANEL: CPT | Performed by: STUDENT IN AN ORGANIZED HEALTH CARE EDUCATION/TRAINING PROGRAM

## 2025-03-12 PROCEDURE — 83735 ASSAY OF MAGNESIUM: CPT | Performed by: STUDENT IN AN ORGANIZED HEALTH CARE EDUCATION/TRAINING PROGRAM

## 2025-03-12 PROCEDURE — 85025 COMPLETE CBC W/AUTO DIFF WBC: CPT | Performed by: STUDENT IN AN ORGANIZED HEALTH CARE EDUCATION/TRAINING PROGRAM

## 2025-03-12 PROCEDURE — 25010000002 VANCOMYCIN 2-0.9 GM/500ML-% SOLUTION: Performed by: STUDENT IN AN ORGANIZED HEALTH CARE EDUCATION/TRAINING PROGRAM

## 2025-03-12 PROCEDURE — 25010000002 KETOROLAC TROMETHAMINE PER 15 MG: Performed by: STUDENT IN AN ORGANIZED HEALTH CARE EDUCATION/TRAINING PROGRAM

## 2025-03-12 PROCEDURE — 94799 UNLISTED PULMONARY SVC/PX: CPT

## 2025-03-12 PROCEDURE — 87637 SARSCOV2&INF A&B&RSV AMP PRB: CPT | Performed by: STUDENT IN AN ORGANIZED HEALTH CARE EDUCATION/TRAINING PROGRAM

## 2025-03-12 PROCEDURE — 83880 ASSAY OF NATRIURETIC PEPTIDE: CPT | Performed by: STUDENT IN AN ORGANIZED HEALTH CARE EDUCATION/TRAINING PROGRAM

## 2025-03-12 PROCEDURE — 71045 X-RAY EXAM CHEST 1 VIEW: CPT

## 2025-03-12 PROCEDURE — 85379 FIBRIN DEGRADATION QUANT: CPT | Performed by: STUDENT IN AN ORGANIZED HEALTH CARE EDUCATION/TRAINING PROGRAM

## 2025-03-12 PROCEDURE — 93005 ELECTROCARDIOGRAM TRACING: CPT | Performed by: STUDENT IN AN ORGANIZED HEALTH CARE EDUCATION/TRAINING PROGRAM

## 2025-03-12 PROCEDURE — 87641 MR-STAPH DNA AMP PROBE: CPT | Performed by: STUDENT IN AN ORGANIZED HEALTH CARE EDUCATION/TRAINING PROGRAM

## 2025-03-12 PROCEDURE — 87040 BLOOD CULTURE FOR BACTERIA: CPT | Performed by: STUDENT IN AN ORGANIZED HEALTH CARE EDUCATION/TRAINING PROGRAM

## 2025-03-12 PROCEDURE — 94640 AIRWAY INHALATION TREATMENT: CPT

## 2025-03-12 PROCEDURE — 86140 C-REACTIVE PROTEIN: CPT | Performed by: STUDENT IN AN ORGANIZED HEALTH CARE EDUCATION/TRAINING PROGRAM

## 2025-03-12 PROCEDURE — 84484 ASSAY OF TROPONIN QUANT: CPT | Performed by: STUDENT IN AN ORGANIZED HEALTH CARE EDUCATION/TRAINING PROGRAM

## 2025-03-12 PROCEDURE — 25010000002 PIPERACILLIN SOD-TAZOBACTAM PER 1 G: Performed by: STUDENT IN AN ORGANIZED HEALTH CARE EDUCATION/TRAINING PROGRAM

## 2025-03-12 PROCEDURE — 84145 PROCALCITONIN (PCT): CPT | Performed by: STUDENT IN AN ORGANIZED HEALTH CARE EDUCATION/TRAINING PROGRAM

## 2025-03-12 PROCEDURE — 81003 URINALYSIS AUTO W/O SCOPE: CPT | Performed by: STUDENT IN AN ORGANIZED HEALTH CARE EDUCATION/TRAINING PROGRAM

## 2025-03-12 PROCEDURE — 36415 COLL VENOUS BLD VENIPUNCTURE: CPT

## 2025-03-12 PROCEDURE — 25010000002 ONDANSETRON PER 1 MG: Performed by: STUDENT IN AN ORGANIZED HEALTH CARE EDUCATION/TRAINING PROGRAM

## 2025-03-12 RX ORDER — SODIUM CHLORIDE 0.9 % (FLUSH) 0.9 %
10 SYRINGE (ML) INJECTION AS NEEDED
Status: DISCONTINUED | OUTPATIENT
Start: 2025-03-12 | End: 2025-03-14 | Stop reason: HOSPADM

## 2025-03-12 RX ORDER — IPRATROPIUM BROMIDE AND ALBUTEROL SULFATE 2.5; .5 MG/3ML; MG/3ML
3 SOLUTION RESPIRATORY (INHALATION) ONCE
Status: COMPLETED | OUTPATIENT
Start: 2025-03-12 | End: 2025-03-12

## 2025-03-12 RX ORDER — VANCOMYCIN 2 GRAM/500 ML IN 0.9 % SODIUM CHLORIDE INTRAVENOUS
20 ONCE
Status: COMPLETED | OUTPATIENT
Start: 2025-03-12 | End: 2025-03-13

## 2025-03-12 RX ORDER — KETOROLAC TROMETHAMINE 15 MG/ML
15 INJECTION, SOLUTION INTRAMUSCULAR; INTRAVENOUS ONCE
Status: COMPLETED | OUTPATIENT
Start: 2025-03-12 | End: 2025-03-12

## 2025-03-12 RX ORDER — ONDANSETRON 2 MG/ML
4 INJECTION INTRAMUSCULAR; INTRAVENOUS ONCE
Status: COMPLETED | OUTPATIENT
Start: 2025-03-12 | End: 2025-03-12

## 2025-03-12 RX ORDER — ACETAMINOPHEN 500 MG
1000 TABLET ORAL ONCE
Status: COMPLETED | OUTPATIENT
Start: 2025-03-12 | End: 2025-03-12

## 2025-03-12 RX ORDER — IOPAMIDOL 755 MG/ML
75 INJECTION, SOLUTION INTRAVASCULAR
Status: COMPLETED | OUTPATIENT
Start: 2025-03-12 | End: 2025-03-12

## 2025-03-12 RX ADMIN — ONDANSETRON 4 MG: 2 INJECTION INTRAMUSCULAR; INTRAVENOUS at 19:58

## 2025-03-12 RX ADMIN — ACETAMINOPHEN 1000 MG: 500 TABLET ORAL at 19:59

## 2025-03-12 RX ADMIN — PIPERACILLIN AND TAZOBACTAM 3.38 G: 3; .375 INJECTION, POWDER, LYOPHILIZED, FOR SOLUTION INTRAVENOUS at 22:39

## 2025-03-12 RX ADMIN — SODIUM CHLORIDE 1000 ML: 9 INJECTION, SOLUTION INTRAVENOUS at 22:37

## 2025-03-12 RX ADMIN — IOPAMIDOL 75 ML: 755 INJECTION, SOLUTION INTRAVENOUS at 20:38

## 2025-03-12 RX ADMIN — KETOROLAC TROMETHAMINE 15 MG: 15 INJECTION, SOLUTION INTRAMUSCULAR; INTRAVENOUS at 19:58

## 2025-03-12 RX ADMIN — SODIUM CHLORIDE 500 ML: 9 INJECTION, SOLUTION INTRAVENOUS at 19:58

## 2025-03-12 RX ADMIN — Medication 2000 MG: at 23:17

## 2025-03-12 RX ADMIN — IPRATROPIUM BROMIDE AND ALBUTEROL SULFATE 3 ML: 2.5; .5 SOLUTION RESPIRATORY (INHALATION) at 21:42

## 2025-03-13 DIAGNOSIS — D75.1 POLYCYTHEMIA: Primary | ICD-10-CM

## 2025-03-13 LAB
ANION GAP SERPL CALCULATED.3IONS-SCNC: 13 MMOL/L (ref 5–15)
BASOPHILS # BLD AUTO: 0.01 10*3/MM3 (ref 0–0.2)
BASOPHILS NFR BLD AUTO: 0.1 % (ref 0–1.5)
BUN SERPL-MCNC: 16 MG/DL (ref 8–23)
BUN/CREAT SERPL: 15.1 (ref 7–25)
CALCIUM SPEC-SCNC: 8.2 MG/DL (ref 8.6–10.5)
CHLORIDE SERPL-SCNC: 102 MMOL/L (ref 98–107)
CO2 SERPL-SCNC: 23 MMOL/L (ref 22–29)
CREAT SERPL-MCNC: 1.06 MG/DL (ref 0.76–1.27)
D-LACTATE SERPL-SCNC: 1.6 MMOL/L (ref 0.5–2)
D-LACTATE SERPL-SCNC: 3.2 MMOL/L (ref 0.5–2)
DEPRECATED RDW RBC AUTO: 51.7 FL (ref 37–54)
EGFRCR SERPLBLD CKD-EPI 2021: 73.6 ML/MIN/1.73
EOSINOPHIL # BLD AUTO: 0 10*3/MM3 (ref 0–0.4)
EOSINOPHIL NFR BLD AUTO: 0 % (ref 0.3–6.2)
ERYTHROCYTE [DISTWIDTH] IN BLOOD BY AUTOMATED COUNT: 15.6 % (ref 12.3–15.4)
GLUCOSE SERPL-MCNC: 139 MG/DL (ref 65–99)
HBA1C MFR BLD: 5.7 % (ref 4.8–5.6)
HCT VFR BLD AUTO: 54.1 % (ref 37.5–51)
HGB BLD-MCNC: 17.1 G/DL (ref 13–17.7)
IMM GRANULOCYTES # BLD AUTO: 0.04 10*3/MM3 (ref 0–0.05)
IMM GRANULOCYTES NFR BLD AUTO: 0.4 % (ref 0–0.5)
L PNEUMO1 AG UR QL IA: NEGATIVE
LYMPHOCYTES # BLD AUTO: 0.46 10*3/MM3 (ref 0.7–3.1)
LYMPHOCYTES NFR BLD AUTO: 4.4 % (ref 19.6–45.3)
MCH RBC QN AUTO: 28.8 PG (ref 26.6–33)
MCHC RBC AUTO-ENTMCNC: 31.6 G/DL (ref 31.5–35.7)
MCV RBC AUTO: 91.1 FL (ref 79–97)
MONOCYTES # BLD AUTO: 0.31 10*3/MM3 (ref 0.1–0.9)
MONOCYTES NFR BLD AUTO: 2.9 % (ref 5–12)
MRSA DNA SPEC QL NAA+PROBE: NEGATIVE
NEUTROPHILS NFR BLD AUTO: 9.75 10*3/MM3 (ref 1.7–7)
NEUTROPHILS NFR BLD AUTO: 92.2 % (ref 42.7–76)
NRBC BLD AUTO-RTO: 0 /100 WBC (ref 0–0.2)
PLATELET # BLD AUTO: 163 10*3/MM3 (ref 140–450)
PMV BLD AUTO: 10.4 FL (ref 6–12)
POTASSIUM SERPL-SCNC: 4.6 MMOL/L (ref 3.5–5.2)
QT INTERVAL: 402 MS
QTC INTERVAL: 460 MS
RBC # BLD AUTO: 5.94 10*6/MM3 (ref 4.14–5.8)
S PNEUM AG SPEC QL LA: NEGATIVE
SODIUM SERPL-SCNC: 138 MMOL/L (ref 136–145)
WBC NRBC COR # BLD AUTO: 10.57 10*3/MM3 (ref 3.4–10.8)

## 2025-03-13 PROCEDURE — 85025 COMPLETE CBC W/AUTO DIFF WBC: CPT | Performed by: NURSE PRACTITIONER

## 2025-03-13 PROCEDURE — 83605 ASSAY OF LACTIC ACID: CPT | Performed by: STUDENT IN AN ORGANIZED HEALTH CARE EDUCATION/TRAINING PROGRAM

## 2025-03-13 PROCEDURE — 99233 SBSQ HOSP IP/OBS HIGH 50: CPT | Performed by: FAMILY MEDICINE

## 2025-03-13 PROCEDURE — 87449 NOS EACH ORGANISM AG IA: CPT | Performed by: NURSE PRACTITIONER

## 2025-03-13 PROCEDURE — 80048 BASIC METABOLIC PNL TOTAL CA: CPT | Performed by: NURSE PRACTITIONER

## 2025-03-13 PROCEDURE — 87899 AGENT NOS ASSAY W/OPTIC: CPT | Performed by: NURSE PRACTITIONER

## 2025-03-13 PROCEDURE — 83036 HEMOGLOBIN GLYCOSYLATED A1C: CPT | Performed by: FAMILY MEDICINE

## 2025-03-13 PROCEDURE — 97161 PT EVAL LOW COMPLEX 20 MIN: CPT

## 2025-03-13 PROCEDURE — 97110 THERAPEUTIC EXERCISES: CPT

## 2025-03-13 PROCEDURE — 25810000003 SODIUM CHLORIDE 0.9 % SOLUTION: Performed by: NURSE PRACTITIONER

## 2025-03-13 PROCEDURE — 25010000002 PIPERACILLIN SOD-TAZOBACTAM PER 1 G: Performed by: NURSE PRACTITIONER

## 2025-03-13 RX ORDER — TRAMADOL HYDROCHLORIDE 50 MG/1
50 TABLET ORAL EVERY 6 HOURS PRN
Status: DISCONTINUED | OUTPATIENT
Start: 2025-03-13 | End: 2025-03-14 | Stop reason: HOSPADM

## 2025-03-13 RX ORDER — SODIUM CHLORIDE 9 MG/ML
40 INJECTION, SOLUTION INTRAVENOUS AS NEEDED
Status: DISCONTINUED | OUTPATIENT
Start: 2025-03-13 | End: 2025-03-14 | Stop reason: HOSPADM

## 2025-03-13 RX ORDER — LISINOPRIL 40 MG/1
40 TABLET ORAL
Status: DISCONTINUED | OUTPATIENT
Start: 2025-03-13 | End: 2025-03-14 | Stop reason: HOSPADM

## 2025-03-13 RX ORDER — ACETAMINOPHEN 160 MG/5ML
650 SOLUTION ORAL EVERY 4 HOURS PRN
Status: DISCONTINUED | OUTPATIENT
Start: 2025-03-13 | End: 2025-03-14 | Stop reason: HOSPADM

## 2025-03-13 RX ORDER — ONDANSETRON 2 MG/ML
4 INJECTION INTRAMUSCULAR; INTRAVENOUS EVERY 6 HOURS PRN
Status: DISCONTINUED | OUTPATIENT
Start: 2025-03-13 | End: 2025-03-14 | Stop reason: HOSPADM

## 2025-03-13 RX ORDER — SODIUM CHLORIDE 9 MG/ML
75 INJECTION, SOLUTION INTRAVENOUS CONTINUOUS
Status: ACTIVE | OUTPATIENT
Start: 2025-03-13 | End: 2025-03-13

## 2025-03-13 RX ORDER — IPRATROPIUM BROMIDE AND ALBUTEROL SULFATE 2.5; .5 MG/3ML; MG/3ML
3 SOLUTION RESPIRATORY (INHALATION) EVERY 4 HOURS PRN
Status: DISCONTINUED | OUTPATIENT
Start: 2025-03-13 | End: 2025-03-14 | Stop reason: HOSPADM

## 2025-03-13 RX ORDER — LEVOTHYROXINE SODIUM 50 UG/1
50 TABLET ORAL EVERY MORNING
Status: DISCONTINUED | OUTPATIENT
Start: 2025-03-13 | End: 2025-03-14 | Stop reason: HOSPADM

## 2025-03-13 RX ORDER — ACETAMINOPHEN 650 MG/1
650 SUPPOSITORY RECTAL EVERY 4 HOURS PRN
Status: DISCONTINUED | OUTPATIENT
Start: 2025-03-13 | End: 2025-03-14 | Stop reason: HOSPADM

## 2025-03-13 RX ORDER — NORTRIPTYLINE HYDROCHLORIDE 10 MG/1
30 CAPSULE ORAL NIGHTLY
Status: DISCONTINUED | OUTPATIENT
Start: 2025-03-13 | End: 2025-03-14 | Stop reason: HOSPADM

## 2025-03-13 RX ORDER — METOPROLOL SUCCINATE 25 MG/1
25 TABLET, EXTENDED RELEASE ORAL DAILY
Status: DISCONTINUED | OUTPATIENT
Start: 2025-03-13 | End: 2025-03-14 | Stop reason: HOSPADM

## 2025-03-13 RX ORDER — SODIUM CHLORIDE 0.9 % (FLUSH) 0.9 %
10 SYRINGE (ML) INJECTION EVERY 12 HOURS SCHEDULED
Status: DISCONTINUED | OUTPATIENT
Start: 2025-03-13 | End: 2025-03-14 | Stop reason: HOSPADM

## 2025-03-13 RX ORDER — MONTELUKAST SODIUM 10 MG/1
10 TABLET ORAL NIGHTLY
Status: DISCONTINUED | OUTPATIENT
Start: 2025-03-13 | End: 2025-03-14 | Stop reason: HOSPADM

## 2025-03-13 RX ORDER — ONDANSETRON 4 MG/1
4 TABLET, ORALLY DISINTEGRATING ORAL EVERY 6 HOURS PRN
Status: DISCONTINUED | OUTPATIENT
Start: 2025-03-13 | End: 2025-03-14 | Stop reason: HOSPADM

## 2025-03-13 RX ORDER — SIMETHICONE 80 MG
80 TABLET,CHEWABLE ORAL 4 TIMES DAILY PRN
Status: DISCONTINUED | OUTPATIENT
Start: 2025-03-13 | End: 2025-03-14 | Stop reason: HOSPADM

## 2025-03-13 RX ORDER — NITROGLYCERIN 0.4 MG/1
0.4 TABLET SUBLINGUAL
Status: DISCONTINUED | OUTPATIENT
Start: 2025-03-13 | End: 2025-03-14 | Stop reason: HOSPADM

## 2025-03-13 RX ORDER — MULTIPLE VITAMINS W/ MINERALS TAB 9MG-400MCG
1 TAB ORAL DAILY
Status: DISCONTINUED | OUTPATIENT
Start: 2025-03-13 | End: 2025-03-14 | Stop reason: HOSPADM

## 2025-03-13 RX ORDER — POLYETHYLENE GLYCOL 3350 17 G/17G
17 POWDER, FOR SOLUTION ORAL DAILY PRN
Status: DISCONTINUED | OUTPATIENT
Start: 2025-03-13 | End: 2025-03-14 | Stop reason: HOSPADM

## 2025-03-13 RX ORDER — SODIUM CHLORIDE 0.9 % (FLUSH) 0.9 %
10 SYRINGE (ML) INJECTION AS NEEDED
Status: DISCONTINUED | OUTPATIENT
Start: 2025-03-13 | End: 2025-03-14 | Stop reason: HOSPADM

## 2025-03-13 RX ORDER — AMOXICILLIN 250 MG
2 CAPSULE ORAL 2 TIMES DAILY
Status: DISCONTINUED | OUTPATIENT
Start: 2025-03-13 | End: 2025-03-14 | Stop reason: HOSPADM

## 2025-03-13 RX ORDER — ACETAMINOPHEN 325 MG/1
650 TABLET ORAL EVERY 4 HOURS PRN
Status: DISCONTINUED | OUTPATIENT
Start: 2025-03-13 | End: 2025-03-14 | Stop reason: HOSPADM

## 2025-03-13 RX ORDER — BISACODYL 5 MG/1
5 TABLET, DELAYED RELEASE ORAL DAILY PRN
Status: DISCONTINUED | OUTPATIENT
Start: 2025-03-13 | End: 2025-03-14 | Stop reason: HOSPADM

## 2025-03-13 RX ORDER — OXYCODONE AND ACETAMINOPHEN 5; 325 MG/1; MG/1
1 TABLET ORAL EVERY 4 HOURS PRN
Status: DISCONTINUED | OUTPATIENT
Start: 2025-03-13 | End: 2025-03-14 | Stop reason: HOSPADM

## 2025-03-13 RX ORDER — ATORVASTATIN CALCIUM 10 MG/1
10 TABLET, FILM COATED ORAL NIGHTLY
Status: DISCONTINUED | OUTPATIENT
Start: 2025-03-13 | End: 2025-03-14 | Stop reason: HOSPADM

## 2025-03-13 RX ORDER — BISACODYL 10 MG
10 SUPPOSITORY, RECTAL RECTAL DAILY PRN
Status: DISCONTINUED | OUTPATIENT
Start: 2025-03-13 | End: 2025-03-14 | Stop reason: HOSPADM

## 2025-03-13 RX ORDER — VANCOMYCIN/0.9 % SOD CHLORIDE 1.5G/250ML
1500 PLASTIC BAG, INJECTION (ML) INTRAVENOUS
Status: DISCONTINUED | OUTPATIENT
Start: 2025-03-13 | End: 2025-03-13

## 2025-03-13 RX ORDER — PANTOPRAZOLE SODIUM 40 MG/1
40 TABLET, DELAYED RELEASE ORAL
Status: DISCONTINUED | OUTPATIENT
Start: 2025-03-13 | End: 2025-03-14 | Stop reason: HOSPADM

## 2025-03-13 RX ADMIN — NORTRIPTYLINE HYDROCHLORIDE 30 MG: 10 CAPSULE ORAL at 20:15

## 2025-03-13 RX ADMIN — MONTELUKAST 10 MG: 10 TABLET, FILM COATED ORAL at 20:15

## 2025-03-13 RX ADMIN — OXYCODONE HYDROCHLORIDE AND ACETAMINOPHEN 1 TABLET: 5; 325 TABLET ORAL at 01:55

## 2025-03-13 RX ADMIN — SODIUM CHLORIDE 75 ML/HR: 9 INJECTION, SOLUTION INTRAVENOUS at 06:06

## 2025-03-13 RX ADMIN — LISINOPRIL 40 MG: 40 TABLET ORAL at 08:15

## 2025-03-13 RX ADMIN — METOPROLOL SUCCINATE 25 MG: 25 TABLET, EXTENDED RELEASE ORAL at 08:15

## 2025-03-13 RX ADMIN — PANTOPRAZOLE SODIUM 40 MG: 40 TABLET, DELAYED RELEASE ORAL at 06:07

## 2025-03-13 RX ADMIN — Medication 10 ML: at 20:16

## 2025-03-13 RX ADMIN — Medication 10 ML: at 08:24

## 2025-03-13 RX ADMIN — OXYCODONE HYDROCHLORIDE AND ACETAMINOPHEN 1 TABLET: 5; 325 TABLET ORAL at 21:59

## 2025-03-13 RX ADMIN — LEVOTHYROXINE SODIUM 50 MCG: 0.05 TABLET ORAL at 06:07

## 2025-03-13 RX ADMIN — Medication 1 TABLET: at 08:15

## 2025-03-13 RX ADMIN — ATORVASTATIN CALCIUM 10 MG: 10 TABLET, FILM COATED ORAL at 01:34

## 2025-03-13 RX ADMIN — PIPERACILLIN AND TAZOBACTAM 3.38 G: 3; .375 INJECTION, POWDER, LYOPHILIZED, FOR SOLUTION INTRAVENOUS at 06:06

## 2025-03-13 RX ADMIN — PIPERACILLIN AND TAZOBACTAM 3.38 G: 3; .375 INJECTION, POWDER, LYOPHILIZED, FOR SOLUTION INTRAVENOUS at 20:15

## 2025-03-13 RX ADMIN — OXYCODONE HYDROCHLORIDE AND ACETAMINOPHEN 1 TABLET: 5; 325 TABLET ORAL at 11:39

## 2025-03-13 RX ADMIN — NORTRIPTYLINE HYDROCHLORIDE 30 MG: 10 CAPSULE ORAL at 01:34

## 2025-03-13 RX ADMIN — SIMETHICONE 80 MG: 80 TABLET, CHEWABLE ORAL at 01:38

## 2025-03-13 RX ADMIN — MONTELUKAST 10 MG: 10 TABLET, FILM COATED ORAL at 01:34

## 2025-03-13 RX ADMIN — PIPERACILLIN AND TAZOBACTAM 3.38 G: 3; .375 INJECTION, POWDER, LYOPHILIZED, FOR SOLUTION INTRAVENOUS at 11:48

## 2025-03-13 RX ADMIN — SENNOSIDES, DOCUSATE SODIUM 2 TABLET: 50; 8.6 TABLET, FILM COATED ORAL at 20:15

## 2025-03-13 RX ADMIN — Medication 10 ML: at 01:35

## 2025-03-13 RX ADMIN — SENNOSIDES, DOCUSATE SODIUM 2 TABLET: 50; 8.6 TABLET, FILM COATED ORAL at 08:14

## 2025-03-13 NOTE — H&P
[facility] Medicine Services  HISTORY AND PHYSICAL    Patient Name: José Miguel Jerry  : 1950  MRN: [id number]  Primary Care Physician: Hernandez Jackman MD  Date of admission: 3/12/2025    Subjective   Subjective     Chief Complaint:  Hypoxia     HPI:  José Miguel Jerry is a 74 y.o. male with a past medical history significant for essential hypertension, hyperlipidemia, CAD, obstructive sleep apnea, prostate cancer s/p biotic prostatectomy in  and hypothyroidism presents to the ED from outpatient surgery center due to hypoxia following a right total knee replacement.  Reportedly during extubation the patient was biting the tube and there was a possible aspiration event.  He was found to be hypoxic and transferred to [facility] ED for further evaluation.  Upon arrival patient does state he feels short of breath at times.  He is currently requiring 6 L of oxygen via nasal cannula with O2 saturation between 90 to 93%.  Initially he reports coughing up blood-tinged sputum.  He denies any nausea, abdominal pain, chest pain or dysuria.  CTA of chest obtained tonight does show groundglass changes in the upper lobes and to a lesser degree the right middle lobe in addition to basilar areas where there is more focal airspace disease.  This could relate to multifocal inflammatory infectious process.  Findings associated with aspiration not entirely excluded.        Review of Systems   Constitutional:  Negative for activity change, appetite change, chills, diaphoresis, fatigue, fever and unexpected weight change.   HENT: Negative.     Eyes:  Negative for photophobia and visual disturbance.   Respiratory:  Positive for cough and shortness of breath.    Cardiovascular:  Negative for chest pain, palpitations and leg swelling.   Gastrointestinal: Negative.    Genitourinary: Negative.    Musculoskeletal: Negative.    Skin: Negative.    Neurological: Negative.     Psychiatric/Behavioral: Negative.                  Personal History     Past Medical History:   Diagnosis Date    Abnormal stress test 08/14/2018    1. MPS 8-8-18: Positive EKG changes Reversible Inferior ischemia EF 60%      Depression     Dyspnea on exertion     Elevated cholesterol     Erectile dysfunction     History of echocardiogram 08/2018    History of exercise stress test 08/2018    History of pneumonia 03/21/2019    Reports 40+ years ago    Hormone disorder 2013    Hypogonadism    Hypertension     Prostate cancer 2012    Seasonal allergies     reports mild    Sleep apnea     Uses CPAP HS - instructed to bring in DOS    Urinary incontinence June 2024    Wears glasses          Oncology Problem List:  Prostate cancer (06/22/2016; Status: Active)  Oncology/Hematology History    No history exists.       Past Surgical History:   Procedure Laterality Date    APPENDECTOMY      BLEPHAROPLASTY Bilateral     CARDIAC CATHETERIZATION N/A 08/20/2018    Procedure: Left Heart Cath;  Surgeon: Anastacia Gary MD;  Location:  [facility];  Service: Cardiology    CARDIOVASCULAR STRESS TEST  12/14/2021    CATARACT EXTRACTION, BILATERAL      CHOLECYSTECTOMY N/A 12/29/2021    Procedure: CHOLECYSTECTOMY LAPAROSCOPIC;  Surgeon: Caleb Edmond MD;  Location:  [facility] OR;  Service: General;  Laterality: N/A;    COLONOSCOPY      HEAD/NECK LESION/CYST EXCISION Right 10/31/2016    Procedure: EXCISE BCCA RIGHT SCALP;  Surgeon: Lowell Rider MD;  Location:  [facility];  Service:     NASAL SEPTUM SURGERY      PENILE PROSTHESIS IMPLANT N/A 11/28/2018    Procedure: PENILE PROSTHESIS PLACEMENT;  Surgeon: Nick Sommer MD;  Location:  [facility];  Service: Urology    PROSTATE SURGERY      SKIN BIOPSY      patient reports basal cell carcinoma removed from head x3 places    TEETH EXTRACTION         Family History:  family history includes Heart attack in his brother, maternal grandfather, paternal grandfather, and  sister; Heart disease in his daughter, father, maternal grandfather, mother, and paternal grandfather; Hypertension in his father, maternal grandfather, mother, and paternal grandfather; Kidney disease in his father; Liver disease in his father.     Social History:  reports that he has never smoked. He has never been exposed to tobacco smoke. He has never used smokeless tobacco. He reports current alcohol use of about 2.0 standard drinks of alcohol per week. He reports that he does not use drugs.  Social History     Social History Narrative    Not on file       Medications:  Azelastine HCl, ISOtretinoin, Lifitegrast, Vibegron, aspirin, benazepril, esomeprazole, hydroCHLOROthiazide, levothyroxine, metoprolol succinate XL, metroNIDAZOLE, montelukast, multivitamin with minerals, mupirocin, nortriptyline, simethicone, simvastatin, and venlafaxine    Allergies   Allergen Reactions    Chlorhexidine Rash    Clindamycin/Lincomycin Rash     itching    Neosporin [Bacitracin-Polymyxin B] Itching and Rash       Objective   Objective     Vital Signs:   Temp:  [97.9 °F (36.6 °C)] 97.9 °F (36.6 °C)  Heart Rate:  [80-82] 82  Resp:  [18] 18  BP: (139)/(76) 139/76  Flow (L/min) (Oxygen Therapy):  [6] 6    Physical Exam  Vitals and nursing note reviewed.   Constitutional:       General: He is not in acute distress.     Appearance: Normal appearance. He is not ill-appearing, toxic-appearing or diaphoretic.   HENT:      Head: Normocephalic.      Nose: Nose normal.      Mouth/Throat:      Mouth: Mucous membranes are dry.      Pharynx: Oropharynx is clear.   Eyes:      Extraocular Movements: Extraocular movements intact.      Conjunctiva/sclera: Conjunctivae normal.      Pupils: Pupils are equal, round, and reactive to light.   Cardiovascular:      Rate and Rhythm: Normal rate and regular rhythm.      Pulses: Normal pulses.      Heart sounds: Normal heart sounds.   Pulmonary:      Effort: Pulmonary effort is normal.      Breath sounds:  Rhonchi present.   Abdominal:      General: Bowel sounds are normal. There is no distension.      Palpations: Abdomen is soft. There is no mass.      Tenderness: There is no abdominal tenderness. There is no right CVA tenderness, left CVA tenderness, guarding or rebound.      Hernia: No hernia is present.   Musculoskeletal:         General: No swelling, tenderness, deformity or signs of injury.      Cervical back: Normal range of motion and neck supple.      Right lower leg: No edema.      Left lower leg: No edema.   Skin:     General: Skin is warm and dry.   Neurological:      General: No focal deficit present.      Mental Status: He is alert and oriented to person, place, and time. Mental status is at baseline.   Psychiatric:         Mood and Affect: Mood normal.         Behavior: Behavior normal.         Thought Content: Thought content normal.         Judgment: Judgment normal.            Result Review:  I have personally reviewed the results from the time of this admission to 3/12/2025 22:31 EDT and agree with these findings:  [x]  Laboratory list / accordion  [x]  Microbiology  [x]  Radiology  [x]  EKG/Telemetry   []  Cardiology/Vascular   []  Pathology  []  Old records  []  Other:  Most notable findings include:     LAB RESULTS:      Lab 03/12/25 1942   WBC 10.53   HEMOGLOBIN 18.1*   HEMATOCRIT 56.1*   PLATELETS 166   NEUTROS ABS 9.94*   IMMATURE GRANS (ABS) 0.05   LYMPHS ABS 0.29*   MONOS ABS 0.24   EOS ABS 0.00   MCV 90.5   CRP <0.30   PROCALCITONIN 0.03   LACTATE 4.6*   D DIMER QUANT 2.39*         Lab 03/12/25 1942   SODIUM 138   POTASSIUM 3.9   CHLORIDE 100   CO2 23.0   ANION GAP 15.0   BUN 18   CREATININE 1.13   EGFR 68.2   GLUCOSE 161*   CALCIUM 8.4*   MAGNESIUM 1.8         Lab 03/12/25 1942   TOTAL PROTEIN 6.0   ALBUMIN 3.7   GLOBULIN 2.3   ALT (SGPT) 30   AST (SGOT) 26   BILIRUBIN 0.5   ALK PHOS 84         Lab 03/12/25 2058 03/12/25 1942   PROBNP  --  37.7   HSTROP T 10 11                 Lab  03/12/25 2205   FIO2 21   CARBOXYHEMOGLOBIN (VENOUS) 1.3     Brief Urine Lab Results  (Last result in the past 365 days)        Color   Clarity   Blood   Leuk Est   Nitrite   Protein   CREAT   Urine HCG        03/12/25 2202 Yellow   Clear   Negative   Negative   Negative   Negative                 Microbiology Results (last 10 days)       Procedure Component Value - Date/Time    COVID PRE-OP / PRE-PROCEDURE SCREENING ORDER (NO ISOLATION) - Swab, Nasopharynx [[id number]]  (Normal) Collected: 03/12/25 2058    Lab Status: Final result Specimen: Swab from Nasopharynx Updated: 03/12/25 2206    Narrative:      The following orders were created for panel order COVID PRE-OP / PRE-PROCEDURE SCREENING ORDER (NO ISOLATION) - Swab, Nasopharynx.  Procedure                               Abnormality         Status                     ---------                               -----------         ------                     COVID-19, FLU A/B, RSV P...[[id number]]  Normal              Final result                 Please view results for these tests on the individual orders.    COVID-19, FLU A/B, RSV PCR 1 HR TAT - Swab, Nasopharynx [[id number]]  (Normal) Collected: 03/12/25 2058    Lab Status: Final result Specimen: Swab from Nasopharynx Updated: 03/12/25 2206     COVID19 Not Detected     Influenza A PCR Not Detected     Influenza B PCR Not Detected     RSV, PCR Not Detected    Narrative:      Fact sheet for providers: https://www.fda.gov/media/287609/download    Fact sheet for patients: https://www.fda.gov/media/501617/download    Test performed by PCR.            CT Angiogram Chest Pulmonary Embolism  Result Date: 3/12/2025  CT ANGIOGRAM CHEST PULMONARY EMBOLISM Date of Exam: 3/12/2025 8:37 PM EDT Indication: Acute hypoxia, tachycardia, surgery earlier today with possible aspiration event, positive dimer. Comparison: Chest x-ray March 12, 2025 Technique: Axial CT images were obtained of the chest after the uneventful intravenous  administration of 75 cc of Isovue-370 utilizing pulmonary embolism protocol.  In addition, a 3-D volume rendered image was created for interpretation.  Reconstructed coronal and sagittal images were also obtained. Automated exposure control and iterative construction methods were used. Findings: A pulmonary embolus not definitely identified. The thoracic aorta does not appear unusual. There is coronary artery calcification. On evaluation lung windows there are some groundglass changes within the upper lobes to a lesser degree the right middle lobe and basilar areas where there is more focal airspace disease. This could relate to multifocal inflammatory infectious process. Findings associated with aspiration not entirely excluded. There are no pleural effusions. Lower slices through the upper abdomen reveal no acute findings. There are some degenerative changes involving the dorsal spine.     Impression: Impression: 1.No definite evidence for a pulmonary embolus. 2.There are some groundglass changes within the upper lobes and to a lesser degree the right middle lobe in addition to basilar areas where there is more focal airspace disease. This could relate to multifocal inflammatory infectious process. Findings associated with aspiration not entirely excluded. 3.Coronary artery calcification. Electronically Signed: Heladio Justice MD  3/12/2025 8:53 PM EDT  Workstation ID: [id number]    XR Chest 1 View  Result Date: 3/12/2025  XR CHEST 1 VW Date of Exam: 3/12/2025 7:40 PM EDT Indication: SOA triage protocol Comparison: November 26, 2018 Findings: Heart not definitely enlarged. The lungs seem clear. There are no pleural effusions.     Impression: Impression: An acute pulmonary process is not apparent. Electronically Signed: Heladio Justice MD  3/12/2025 8:08 PM EDT  Workstation ID: [id number]          Assessment & Plan   Assessment & Plan       Aspiration pneumonia    Essential hypertension    Dyslipidemia    GERD  (gastroesophageal reflux disease)    CAD (coronary artery disease)    Sleep apnea    History of prostate cancer      74 year old male presents to the ED from outpatient surgery center due to hypoxia.     1) aspiration pneumonia      Acute respiratory failure with hypoxia  - CTA chest mentioned above  - Blood cultures x 2 pending  - Continue vancomycin and Zosyn  - Currently requiring 6 L of oxygen via nasal cannula with O2 saturation between 90 and 93%  - Sputum culture  - Check urine antigens  - Check MRSA PCR  - Continuous pulse ox  - DuoNebs    2) S/p right total knee replacement  -ortho consult in am   -continue tramadol and percocet     3) obstructive sleep apnea  -BIPAP at night   -continuous pulse ox     4) Essential hypertension   -continue benazepril, metoprolol  -hold HCTZ for now      5) Hyperlipidemia  -continue statin     6) GERD  -PPI     7) Hypothyroidism   -continue synthroid       DVT prophylaxis:  scds    CODE STATUS:  Full Code        Expected Discharge  TBD     This note has been completed as part of a split-shared workflow.     Signature: Electronically signed by ERENDIRA Partida, 03/12/25, 10:40 PM EDT.

## 2025-03-13 NOTE — PROGRESS NOTES
Pharmacy Consult - Vancomycin Dosing and Monitoring    José Miguel Jerry is a 74 y.o. male receiving vancomycin therapy.     Indication:  Pneumonia   Consulting Provider:  Mechelle KRISHNA  ID Consult:     Goal AUC: 400-600 mg/L*hr    Current Antimicrobial Therapy  Anti-Infectives (From admission, onward)      Ordered     Dose/Rate Route Frequency Start Stop    03/13/25 0507  vancomycin IVPB 1500 mg in 0.9% NaCl (Premix) 500 mL        Ordering Provider: Victor Manuel Jones, [facility]    1,500 mg  333.3 mL/hr over 90 Minutes Intravenous Every 18 Hours 03/13/25 1800 03/21/25 0559    03/13/25 0055  piperacillin-tazobactam (ZOSYN) 3.375 g IVPB in 100 mL NS MBP (CD)        Ordering Provider: Mechelle Pierce APRN    3.375 g  over 4 Hours Intravenous Every 8 Hours Scheduled 03/13/25 0430 03/17/25 0359    03/13/25 0055  Pharmacy to dose vancomycin        Ordering Provider: Mechelle Pierce APRN     Not Applicable Continuous PRN 03/13/25 0054 03/18/25 0053    03/12/25 2127  vancomycin IVPB 2000 mg in 0.9% Sodium Chloride 500 mL        Ordering Provider: Vini Stokes MD    20 mg/kg × 101 kg  250 mL/hr over 120 Minutes Intravenous Once 03/12/25 2230 03/13/25 0117    03/12/25 2127  piperacillin-tazobactam (ZOSYN) 3.375 g IVPB in 100 mL NS MBP (CD)        Ordering Provider: Vini Stokes MD    3.375 g  over 30 Minutes Intravenous Once 03/12/25 2143 03/12/25 2316          Allergies  Allergies as of 03/12/2025 - Reviewed 03/12/2025   Allergen Reaction Noted    Chlorhexidine Rash 08/19/2018    Clindamycin/lincomycin Rash 08/19/2018    Neosporin [bacitracin-polymyxin b] Itching and Rash 11/03/2020     Labs  Results from last 7 days   Lab Units 03/13/25  0159 03/12/25  1942   BUN mg/dL 16 18   CREATININE mg/dL 1.06 1.13     Results from last 7 days   Lab Units 03/13/25  0159 03/12/25  1942   WBC 10*3/mm3 10.57 10.53     Evaluation of Dosing     Last Dose Received in the ED/Outside Facility:  Vancomycin 2000 mg IV x  "1  Is Patient on Dialysis or Renal Replacement:     Height - 185.4 cm (72.99\")  Weight - 101 kg (222 lb 10.6 oz)    Estimated Creatinine Clearance: 76.4 mL/min (by C-G formula based on SCr of 1.06 mg/dL).    No intake/output data recorded.    Microbiology and Radiology  Microbiology Results (last 10 days)       Procedure Component Value - Date/Time    COVID PRE-OP / PRE-PROCEDURE SCREENING ORDER (NO ISOLATION) - Swab, Nasopharynx [[id number]]  (Normal) Collected: 03/12/25 2058    Lab Status: Final result Specimen: Swab from Nasopharynx Updated: 03/12/25 2206    Narrative:      The following orders were created for panel order COVID PRE-OP / PRE-PROCEDURE SCREENING ORDER (NO ISOLATION) - Swab, Nasopharynx.  Procedure                               Abnormality         Status                     ---------                               -----------         ------                     COVID-19, FLU A/B, RSV P...[[id number]]  Normal              Final result                 Please view results for these tests on the individual orders.    COVID-19, FLU A/B, RSV PCR 1 HR TAT - Swab, Nasopharynx [[id number]]  (Normal) Collected: 03/12/25 2058    Lab Status: Final result Specimen: Swab from Nasopharynx Updated: 03/12/25 2206     COVID19 Not Detected     Influenza A PCR Not Detected     Influenza B PCR Not Detected     RSV, PCR Not Detected    Narrative:      Fact sheet for providers: https://www.fda.gov/media/622382/download    Fact sheet for patients: https://www.fda.gov/media/792650/download    Test performed by PCR.          Reported Vancomycin Levels              InsightRX AUC Calculation:    Current AUC: 0  mg/L*hr    Predicted Steady State AUC on Current Dose: 555 mg/L*hr (1500 mg Q18H)  _________________________________    Predicted Steady State AUC on New Dose:  mg/L*hr    Assessment/Plan:   Vancomycin 2000 mg IV x 1, then Vancomycin 1500 mg IV Q18H (15 mg/kg/dose)  Vancomycin level on Friday 3/14/25 06:00 (3rd dose due " 12:00 Noon)  MRSA Screen PCR ordered  BMP with AM Lab 3/14  Pharmacy to follow    Victor Manuel Jones RPH  3/13/2025  05:15 EDT

## 2025-03-13 NOTE — PLAN OF CARE
Goal Outcome Evaluation:  Plan of Care Reviewed With: patient           Outcome Evaluation: Patient presents with deficits in strength, ROM, and endurance. He was able to ambulate 350' SBA while on 2L O2 with SpO2 at 91%. Reviewed his surgeons postop exercise instructions and performed as able. Will plan to initiate flexion exercises next session. IPPT will follow while admitted. Recommend D/C home with HHPT when medically appropriate.    Anticipated Discharge Disposition (PT): home with assist, home with home health

## 2025-03-13 NOTE — ED NOTES
José Miguel Jerry    Nursing Report ED to Floor:  Mental status: a/oX4  Ambulatory status: Assist x1- surgery  Oxygen Therapy:  NC @ 2lpm  Cardiac Rhythm: NSR  Admitted from: ED  Safety Concerns:  Fall  Precautions: None  Social Issues: N/A  ED Room #:  02    ED Nurse Phone Extension - [phone] or may call [phone].      HPI:   Chief Complaint   Patient presents with    Shortness of Breath       Past Medical History:  Past Medical History:   Diagnosis Date    Abnormal stress test 08/14/2018    1. MPS 8-8-18: Positive EKG changes Reversible Inferior ischemia EF 60%      Depression     Dyspnea on exertion     Elevated cholesterol     Erectile dysfunction     History of echocardiogram 08/2018    History of exercise stress test 08/2018    History of pneumonia 03/21/2019    Reports 40+ years ago    Hormone disorder 2013    Hypogonadism    Hypertension     Prostate cancer 2012    Seasonal allergies     reports mild    Sleep apnea     Uses CPAP HS - instructed to bring in DOS    Urinary incontinence June 2024    Wears glasses         Past Surgical History:  Past Surgical History:   Procedure Laterality Date    APPENDECTOMY      BLEPHAROPLASTY Bilateral     CARDIAC CATHETERIZATION N/A 08/20/2018    Procedure: Left Heart Cath;  Surgeon: Anastacia Gary MD;  Location:  [facility];  Service: Cardiology    CARDIOVASCULAR STRESS TEST  12/14/2021    CATARACT EXTRACTION, BILATERAL      CHOLECYSTECTOMY N/A 12/29/2021    Procedure: CHOLECYSTECTOMY LAPAROSCOPIC;  Surgeon: Caleb Edmond MD;  Location:  [facility];  Service: General;  Laterality: N/A;    COLONOSCOPY      HEAD/NECK LESION/CYST EXCISION Right 10/31/2016    Procedure: EXCISE BCCA RIGHT SCALP;  Surgeon: Lowell Rider MD;  Location:  [facility];  Service:     NASAL SEPTUM SURGERY      PENILE PROSTHESIS IMPLANT N/A 11/28/2018    Procedure: PENILE PROSTHESIS PLACEMENT;  Surgeon: Nick Sommer MD;  Location:  [facility];  Service: Urology    PROSTATE SURGERY    "   SKIN BIOPSY      patient reports basal cell carcinoma removed from head x3 places    TEETH EXTRACTION          Admitting Doctor:   Isidoro Hairston MD    Consulting Provider(s):  Consults       No orders found from 2/11/2025 to 3/13/2025.             Admitting Diagnosis:   The primary encounter diagnosis was Acute respiratory failure with hypoxia. Diagnoses of Aspiration pneumonia of right middle lobe, unspecified aspiration pneumonia type, Postoperative hypoxia, and Lactic acidemia were also pertinent to this visit.    Most Recent Vitals:   Vitals:    03/12/25 1933 03/12/25 2142   BP: 139/76    BP Location: Right arm    Patient Position: Lying    Pulse: 80 82   Resp: 18 18   Temp: 97.9 °F (36.6 °C)    TempSrc: Oral    SpO2: 91% 93%   Weight: 101 kg (222 lb 10.6 oz)    Height: 185.4 cm (72.99\")        Active LDAs/IV Access:   Lines, Drains & Airways       Active LDAs       Name Placement date Placement time Site Days    Peripheral IV 03/12/25 1933 Left;Posterior Hand 03/12/25 1933  Hand  less than 1    Peripheral IV 03/12/25 1958 Anterior;Right Forearm 03/12/25 1958  Forearm  less than 1    Closed/Suction Drain 1 Other (Comment)  11/28/18  1034  Other (Comment)  SCROTUM  2296                    Labs (abnormal labs have a star):   Labs Reviewed   COMPREHENSIVE METABOLIC PANEL - Abnormal; Notable for the following components:       Result Value    Glucose 161 (*)     Calcium 8.4 (*)     All other components within normal limits    Narrative:     GFR Categories in Chronic Kidney Disease (CKD)      GFR Category          GFR (mL/min/1.73)    Interpretation  G1                     90 or greater         Normal or high (1)  G2                      60-89                Mild decrease (1)  G3a                   45-59                Mild to moderate decrease  G3b                   30-44                Moderate to severe decrease  G4                    15-29                Severe decrease  G5                    14 or less  " "         Kidney failure          (1)In the absence of evidence of kidney disease, neither GFR category G1 or G2 fulfill the criteria for CKD.    eGFR calculation 2021 CKD-EPI creatinine equation, which does not include race as a factor   CBC WITH AUTO DIFFERENTIAL - Abnormal; Notable for the following components:    RBC 6.20 (*)     Hemoglobin 18.1 (*)     Hematocrit 56.1 (*)     RDW 15.6 (*)     Neutrophil % 94.3 (*)     Lymphocyte % 2.8 (*)     Monocyte % 2.3 (*)     Eosinophil % 0.0 (*)     Neutrophils, Absolute 9.94 (*)     Lymphocytes, Absolute 0.29 (*)     All other components within normal limits   D-DIMER, QUANTITATIVE - Abnormal; Notable for the following components:    D-Dimer, Quantitative 2.39 (*)     All other components within normal limits    Narrative:     According to the assay 's published package insert, a normal (<0.50 MCGFEU/mL) D-dimer result in conjunction with a non-high clinical probability assessment, excludes deep vein thrombosis (DVT) and pulmonary embolism (PE) with high sensitivity.    D-dimer values increase with age and this can make VTE exclusion of an older population difficult. To address this, the [employer], based on best available evidence and recent guidelines, recommends that clinicians use age-adjusted D-dimer thresholds in patients greater than 50 years of age with: a) a low probability of PE who do not meet all Pulmonary Embolism Rule Out Criteria, or b) in those with intermediate probability of PE.   The formula for an age-adjusted D-dimer cut-off is \"age/100\".  For example, a 60 year old patient would have an age-adjusted cut-off of 0.60 MCGFEU/mL and an 80 year old 0.80 MCGFEU/mL.   LACTIC ACID, PLASMA - Abnormal; Notable for the following components:    Lactate 4.6 (*)     All other components within normal limits   BLOOD GAS, VENOUS W/CO-OXIMETRY - Abnormal; Notable for the following components:    pO2, Venous 63.8 (*)     Hemoglobin, " Blood Gas 18.3 (*)     All other components within normal limits   COVID-19/FLUA&B/RSV, NP SWAB IN TRANSPORT MEDIA 1 HR TAT - Normal    Narrative:     Fact sheet for providers: https://www.fda.gov/media/892513/download    Fact sheet for patients: https://www.fda.gov/media/104031/download    Test performed by PCR.   BNP (IN-HOUSE) - Normal    Narrative:     This assay is used as an aid in the diagnosis of individuals suspected of having heart failure. It can be used as an aid in the diagnosis of acute decompensated heart failure (ADHF) in patients presenting with signs and symptoms of ADHF to the emergency department (ED). In addition, NT-proBNP of <300 pg/mL indicates ADHF is not likely.    Age Range Result Interpretation  NT-proBNP Concentration (pg/mL:      <50             Positive            >450                   Gray                 300-450                    Negative             <300    50-75           Positive            >900                  Gray                300-900                  Negative            <300      >75             Positive            >1800                  Gray                300-1800                  Negative            <300   TROPONIN - Normal    Narrative:     High Sensitive Troponin T Reference Range:  <14.0 ng/L- Negative Female for AMI  <22.0 ng/L- Negative Male for AMI  >=14 - Abnormal Female indicating possible myocardial injury.  >=22 - Abnormal Male indicating possible myocardial injury.   Clinicians would have to utilize clinical acumen, EKG, Troponin, and serial changes to determine if it is an Acute Myocardial Infarction or myocardial injury due to an underlying chronic condition.        URINALYSIS W/ MICROSCOPIC IF INDICATED (NO CULTURE) - Normal    Narrative:     Urine microscopic not indicated.   PROCALCITONIN - Normal    Narrative:     As a Marker for Sepsis (Non-Neonates):    1. <0.5 ng/mL represents a low risk of severe sepsis and/or septic shock.  2. >2 ng/mL represents a  "high risk of severe sepsis and/or septic shock.    As a Marker for Lower Respiratory Tract Infections that require antibiotic therapy:    PCT on Admission    Antibiotic Therapy       6-12 Hrs later    >0.5                Strongly Recommended  >0.25 - <0.5        Recommended   0.1 - 0.25          Discouraged              Remeasure/reassess PCT  <0.1                Strongly Discouraged     Remeasure/reassess PCT    As 28 day mortality risk marker: \"Change in Procalcitonin Result\" (>80% or <=80%) if Day 0 (or Day 1) and Day 4 values are available. Refer to http://www.[facility]-pct-calculator.com    Change in PCT <=80%  A decrease of PCT levels below or equal to 80% defines a positive change in PCT test result representing a higher risk for 28-day all-cause mortality of patients diagnosed with severe sepsis for septic shock.    Change in PCT >80%  A decrease of PCT levels of more than 80% defines a negative change in PCT result representing a lower risk for 28-day all-cause mortality of patients diagnosed with severe sepsis or septic shock.      C-REACTIVE PROTEIN - Normal   MAGNESIUM - Normal   HIGH SENSITIVITIY TROPONIN T 1HR - Normal    Narrative:     High Sensitive Troponin T Reference Range:  <14.0 ng/L- Negative Female for AMI  <22.0 ng/L- Negative Male for AMI  >=14 - Abnormal Female indicating possible myocardial injury.  >=22 - Abnormal Male indicating possible myocardial injury.   Clinicians would have to utilize clinical acumen, EKG, Troponin, and serial changes to determine if it is an Acute Myocardial Infarction or myocardial injury due to an underlying chronic condition.        COVID PRE-OP / PRE-PROCEDURE SCREENING ORDER (NO ISOLATION)    Narrative:     The following orders were created for panel order COVID PRE-OP / PRE-PROCEDURE SCREENING ORDER (NO ISOLATION) - Swab, Nasopharynx.  Procedure                               Abnormality         Status                     ---------                               " -----------         ------                     COVID-19, FLU A/B, RSV P...[[id number]]  Normal              Final result                 Please view results for these tests on the individual orders.   BLOOD CULTURE   BLOOD CULTURE   MRSA SCREEN, PCR   RAINBOW DRAW    Narrative:     The following orders were created for panel order [place] Draw.  Procedure                               Abnormality         Status                     ---------                               -----------         ------                     Green Top (Gel)[[id number]]                                  Final result               Lavender Top[[id number]]                                     Final result               Gold Top - SST[[id number]]                                   Final result               Santos Top[[id number]]                                         Final result               Light Blue Top[[id number]]                                   Final result                 Please view results for these tests on the individual orders.   BLOOD GAS, VENOUS   LACTIC ACID, REFLEX   CBC AND DIFFERENTIAL    Narrative:     The following orders were created for panel order CBC & Differential.  Procedure                               Abnormality         Status                     ---------                               -----------         ------                     CBC Auto Differential[[id number]]        Abnormal            Final result                 Please view results for these tests on the individual orders.   GREEN TOP   LAVENDER TOP   GOLD TOP - SST   GRAY TOP   LIGHT BLUE TOP       Meds Given in ED:   Medications   sodium chloride 0.9 % flush 10 mL (has no administration in time range)   sodium chloride 0.9 % bolus 1,000 mL (1,000 mL Intravenous New Bag 3/12/25 9020)   vancomycin IVPB 2000 mg in 0.9% Sodium Chloride 500 mL (has no administration in time range)   piperacillin-tazobactam (ZOSYN) 3.375 g IVPB in 100 mL NS MBP (CD) (3.375 g  Intravenous New Bag 3/12/25 2239)   sodium chloride 0.9 % bolus 500 mL (0 mL Intravenous Stopped 3/12/25 2201)   ondansetron (ZOFRAN) injection 4 mg (4 mg Intravenous Given 3/12/25 1958)   ketorolac (TORADOL) injection 15 mg (15 mg Intravenous Given 3/12/25 1958)   acetaminophen (TYLENOL) tablet 1,000 mg (1,000 mg Oral Given 3/12/25 1959)   ipratropium-albuterol (DUO-NEB) nebulizer solution 3 mL (3 mL Nebulization Given 3/12/25 2142)   iopamidol (ISOVUE-370) 76 % injection 75 mL (75 mL Intravenous Given 3/12/25 2038)           Last NIH score:                                                          Dysphagia screening results:        [place] Coma Scale:  No data recorded     CIWA:        Restraint Type:            Isolation Status:  No active isolations

## 2025-03-13 NOTE — CASE MANAGEMENT/SOCIAL WORK
Discharge Planning Assessment  [facility]     Patient Name: José Miguel Jerry  MRN: [id number]  Today's Date: 3/13/2025    Admit Date: 3/12/2025    Plan: home with    Discharge Needs Assessment       Row Name 03/13/25 0933       Living Environment    People in Home spouse    Current Living Arrangements home    Potentially Unsafe Housing Conditions none    In the past 12 months has the electric, gas, oil, or water company threatened to shut off services in your home? No    Primary Care Provided by self    Provides Primary Care For no one    Family Caregiver if Needed spouse    Quality of Family Relationships involved;helpful    Able to Return to Prior Arrangements yes       Resource/Environmental Concerns    Resource/Environmental Concerns none    Transportation Concerns none       Transportation Needs    In the past 12 months, has lack of transportation kept you from medical appointments or from getting medications? no    In the past 12 months, has lack of transportation kept you from meetings, work, or from getting things needed for daily living? No       Food Insecurity    Within the past 12 months, you worried that your food would run out before you got the money to buy more. Never true    Within the past 12 months, the food you bought just didn't last and you didn't have money to get more. Never true       Transition Planning    Patient/Family Anticipates Transition to home with help/services    Patient/Family Anticipated Services at Transition none    Transportation Anticipated family or friend will provide       Discharge Needs Assessment    Readmission Within the Last 30 Days no previous admission in last 30 days    Equipment Currently Used at Home walker, rolling    Concerns to be Addressed discharge planning    Do you want help finding or keeping work or a job? I do not need or want help    Do you want help with school or training? For example, starting or completing job training or getting a high school  diploma, GED or equivalent No    Anticipated Changes Related to Illness none    Equipment Needed After Discharge none                   Discharge Plan       Row Name 03/13/25 0934       Plan    Plan home with     Patient/Family in Agreement with Plan yes    Plan Comments Pt lives in [facility] with his wife. He reports he was independent with ADLs and mobility prior to admit. He owns a rolling walker. Pt is followed by his PCP and has drug coverage. At this time his plan for discharge is to return home. [facility] has pre-referral for PT. No dc needs at this time    Final Discharge Disposition Code 06 - home with home health care                  Continued Care and Services - Admitted Since 3/12/2025       Home Medical Care       Service Provider Request Status Services Address Phone Fax Patient Preferred    [facility] [address] [place], [place] [id number] [phone] --                     Demographic Summary       Row Name 03/13/25 0932       General Information    Admission Type inpatient    Referral Source physician    Reason for Consult discharge planning    Preferred Language English    General Information Comments PCP Hernandez Jackman       Contact Information    Permission Granted to Share Info With family/designee    Contact Information Comments Genna Jerry [phone]                   Functional Status       Row Name 03/13/25 0932       Functional Status    Usual Activity Tolerance good    Current Activity Tolerance good       Physical Activity    On average, how many days per week do you engage in moderate to strenuous exercise (like a brisk walk)? 0 days    On average, how many minutes do you engage in exercise at this level? 0 min    Number of minutes of exercise per week 0       Functional Status, IADL    Medications independent    Meal Preparation independent    Housekeeping independent    Laundry independent    Shopping  independent    If for any reason you need help with day-to-day activities such as bathing, preparing meals, shopping, managing finances, etc., do you get the help you need? I don't need any help       Mental Status    General Appearance WDL WDL       Mental Status Summary    Recent Changes in Mental Status/Cognitive Functioning no changes       Employment/    Employment Status retired    Current or Previous Occupation not applicable                   Psychosocial    No documentation.                  Abuse/Neglect    No documentation.                  Legal    No documentation.                  Substance Abuse    No documentation.                  Patient Forms    No documentation.                     Carlene Sagastume RN

## 2025-03-13 NOTE — THERAPY EVALUATION
Patient Name: José Miguel Jerry  : 1950    MRN: [id number]                              Today's Date: 3/13/2025       Admit Date: 3/12/2025    Visit Dx:     ICD-10-CM ICD-9-CM   1. Acute respiratory failure with hypoxia  J96.01 518.81   2. Aspiration pneumonia of right middle lobe, unspecified aspiration pneumonia type  J69.0 507.0   3. Postoperative hypoxia  R09.02 799.02    Z98.890    4. Lactic acidemia  E87.20 276.2     Patient Active Problem List   Diagnosis    Prostate cancer    Low testosterone    Essential hypertension    Dyslipidemia    GERD (gastroesophageal reflux disease)    Erectile dysfunction after radical prostatectomy    Dysesthesia    Neuropathic pain    Polycythemia    CAD (coronary artery disease)    Aspiration pneumonia    Sleep apnea    History of prostate cancer     Past Medical History:   Diagnosis Date    Abnormal stress test 2018    1. MPS 8-8-18: Positive EKG changes Reversible Inferior ischemia EF 60%      Depression     Dyspnea on exertion     Elevated cholesterol     Erectile dysfunction     History of echocardiogram 2018    History of exercise stress test 2018    History of pneumonia 2019    Reports 40+ years ago    Hormone disorder 2013    Hypogonadism    Hypertension     Prostate cancer 2012    Seasonal allergies     reports mild    Sleep apnea     Uses CPAP HS - instructed to bring in DOS    Urinary incontinence 2024    Wears glasses      Past Surgical History:   Procedure Laterality Date    APPENDECTOMY      BLEPHAROPLASTY Bilateral     CARDIAC CATHETERIZATION N/A 2018    Procedure: Left Heart Cath;  Surgeon: Anastacia Gary MD;  Location:  [facility];  Service: Cardiology    CARDIOVASCULAR STRESS TEST  2021    CATARACT EXTRACTION, BILATERAL      CHOLECYSTECTOMY N/A 2021    Procedure: CHOLECYSTECTOMY LAPAROSCOPIC;  Surgeon: Caleb Edmond MD;  Location:  [facility];  Service: General;  Laterality: N/A;    COLONOSCOPY       HEAD/NECK LESION/CYST EXCISION Right 10/31/2016    Procedure: EXCISE BCCA RIGHT SCALP;  Surgeon: Lowell Rider MD;  Location:  [facility];  Service:     NASAL SEPTUM SURGERY      PENILE PROSTHESIS IMPLANT N/A 11/28/2018    Procedure: PENILE PROSTHESIS PLACEMENT;  Surgeon: Nick Sommer MD;  Location:  [facility];  Service: Urology    PROSTATE SURGERY      SKIN BIOPSY      patient reports basal cell carcinoma removed from head x3 places    TEETH EXTRACTION        General Information       Row Name 03/13/25 1619          Physical Therapy Time and Intention    Document Type evaluation  -CK     Mode of Treatment physical therapy  -CK       Row Name 03/13/25 1619          General Information    Patient Profile Reviewed yes  -CK     Prior Level of Function independent:;all household mobility;ADL's  typically ind no AD, will use walking sticks on a long walk, owns a FWW for discharge  -CK     Existing Precautions/Restrictions fall;other (see comments);oxygen therapy device and L/min  RLE WBAT, partial knee replacement at outpatient surgery center  -CK     Barriers to Rehab none identified  -CK       Row Name 03/13/25 1619          Living Environment    Current Living Arrangements home  -CK     People in Home spouse  -CK       Row Name 03/13/25 1619          Home Main Entrance    Number of Stairs, Main Entrance other (see comments)  ramp  -CK       Row Name 03/13/25 1619          Stairs Within Home, Primary    Stairs, Within Home, Primary has a basement that he does not need to be able to navigate to  -CK       Row Name 03/13/25 1619          Cognition    Orientation Status (Cognition) oriented x 3  -CK       Row Name 03/13/25 1619          Safety Issues/Impairments Affecting Functional Mobility    Safety Issues Affecting Function (Mobility) awareness of need for assistance;safety precaution awareness;safety precautions follow-through/compliance;sequencing abilities  -CK     Impairments Affecting Function  (Mobility) balance;pain;range of motion (ROM);strength  -CK               User Key  (r) = Recorded By, (t) = Taken By, (c) = Cosigned By      Initials Name Provider Type    CK Julissa Preciado PT Physical Therapist                   Mobility       Row Name 03/13/25 1620          Bed Mobility    Bed Mobility supine-sit  -CK     Supine-Sit [place] (Bed Mobility) standby assist  -CK       Row Name 03/13/25 1620          Sit-Stand Transfer    Sit-Stand [place] (Transfers) contact guard  -CK       Row Name 03/13/25 1620          Gait/Stairs (Locomotion)    [place] Level (Gait) standby assist  -CK     Patient was able to Ambulate yes  -CK     Distance in Feet (Gait) 350  -CK     Deviations/Abnormal Patterns (Gait) bilateral deviations;gait speed decreased  -CK     Comment, (Gait/Stairs) Patient ambulate in mathur on 2L O2 with step through gait pattern. He demo'd good sequencing with walker and good weight acceptance onto RLE. No LOB or buckling with mobility. His SpO2 was 91% while ambulating on 2L.  -CK       Row Name 03/13/25 1620          Mobility    Extremity Weight-bearing Status right lower extremity  -CK     Right Lower Extremity (Weight-bearing Status) weight-bearing as tolerated (WBAT)  -CK               User Key  (r) = Recorded By, (t) = Taken By, (c) = Cosigned By      Initials Name Provider Type    CK Julissa Preciado PT Physical Therapist                   Obj/Interventions       Row Name 03/13/25 1621          Range of Motion Comprehensive    General Range of Motion lower extremity range of motion deficits identified  -CK     Comment, General Range of Motion R knee AROM 5-90  -CK       Row Name 03/13/25 1621          Strength Comprehensive (MMT)    Comment, General Manual Muscle Testing (MMT) Assessment LLE 5/5, RLE able to perform SLR, LAQ, and strong quad set  -CK       Row Name 03/13/25 1621          Motor Skills    Therapeutic Exercise knee;ankle;other (see comments)  looked at  patient's postop packet and performed extension exercises as listed, will plan to initiate flexion exercises next session  -CK       Row Name 03/13/25 1621          Knee (Therapeutic Exercise)    Knee (Therapeutic Exercise) isometric exercises;strengthening exercise  -CK     Knee Isometrics (Therapeutic Exercise) left;quad sets;10 repetitions;3 second hold  -CK     Knee Strengthening (Therapeutic Exercise) left;LAQ (long arc quad);10 repetitions  -CK       Row Name 03/13/25 1621          Ankle (Therapeutic Exercise)    Ankle (Therapeutic Exercise) AROM (active range of motion)  -CK     Ankle AROM (Therapeutic Exercise) bilateral;dorsiflexion;plantarflexion;10 repetitions  -CK       Row Name 03/13/25 1621          Balance    Balance Assessment sitting static balance;standing static balance;standing dynamic balance  -CK     Static Sitting Balance independent  -CK     Position, Sitting Balance unsupported;sitting edge of bed  -CK     Static Standing Balance standby assist  -CK     Dynamic Standing Balance standby assist  -CK     Position/Device Used, Standing Balance supported;walker, front-wheeled  -CK     Comment, Balance no LOB  -CK       Row Name 03/13/25 1621          Sensory Assessment (Somatosensory)    Sensory Assessment (Somatosensory) LE sensation intact  -CK               User Key  (r) = Recorded By, (t) = Taken By, (c) = Cosigned By      Initials Name Provider Type    CK Julissa Preciado, PT Physical Therapist                   Goals/Plan       Row Name 03/13/25 1626          Bed Mobility Goal 1 (PT)    Activity/Assistive Device (Bed Mobility Goal 1, PT) sit to supine/supine to sit  -CK     [place] Level/Cues Needed (Bed Mobility Goal 1, PT) independent  -CK     Time Frame (Bed Mobility Goal 1, PT) short term goal (STG);3 days  -CK     Progress/Outcomes (Bed Mobility Goal 1, PT) new goal  -CK       Row Name 03/13/25 1626          Transfer Goal 1 (PT)    Activity/Assistive Device (Transfer Goal 1, PT)  sit-to-stand/stand-to-sit;bed-to-chair/chair-to-bed  -CK     [place] Level/Cues Needed (Transfer Goal 1, PT) modified independence  -CK     Time Frame (Transfer Goal 1, PT) long term goal (LTG);3 days  -CK     Progress/Outcome (Transfer Goal 1, PT) new goal  -CK       Row Name 03/13/25 1626          Gait Training Goal 1 (PT)    Activity/Assistive Device (Gait Training Goal 1, PT) gait (walking locomotion);assistive device use  -CK     [place] Level (Gait Training Goal 1, PT) standby assist  -CK     Distance (Gait Training Goal 1, PT) 500'  -CK     Time Frame (Gait Training Goal 1, PT) long term goal (LTG);3 days  -CK     Progress/Outcome (Gait Training Goal 1, PT) new goal  -CK       Row Name 03/13/25 1626          ROM Goal 1 (PT)    ROM Goal 1 (PT) operative knee AROM 0-90  -CK     Time Frame (ROM Goal 1, PT) long-term goal (LTG);3 days  -CK     Progress/Outcome (ROM Goal 1, PT) new goal  -CK       Row Name 03/13/25 1626          Therapy Assessment/Plan (PT)    Planned Therapy Interventions (PT) balance training;bed mobility training;gait training;home exercise program;neuromuscular re-education;patient/family education;postural re-education;ROM (range of motion);stair training;strengthening;stretching;transfer training  -CK               User Key  (r) = Recorded By, (t) = Taken By, (c) = Cosigned By      Initials Name Provider Type    CK Julissa Preciado, PT Physical Therapist                   Clinical Impression       Row Name 03/13/25 1623          Pain    Pretreatment Pain Rating 1/10  -CK     Posttreatment Pain Rating 3/10  -CK     Pain Location knee  -CK     Pain Side/Orientation right  -CK     Pain Management Interventions exercise or physical activity utilized;positioning techniques utilized  -CK     Response to Pain Interventions activity participation with increased pain  -CK       Row Name 03/13/25 1623          Plan of Care Review    Plan of Care Reviewed With patient  -CK     Outcome  Evaluation Patient presents with deficits in strength, ROM, and endurance. He was able to ambulate 350' SBA while on 2L O2 with SpO2 at 91%. Reviewed his surgeons postop exercise instructions and performed as able. Will plan to initiate flexion exercises next session. IPPT will follow while admitted. Recommend D/C home with HHPT when medically appropriate.  -CK       Row Name 03/13/25 2002          Therapy Assessment/Plan (PT)    Patient/Family Therapy Goals Statement (PT) go home  -CK     Rehab Potential (PT) good  -CK     Criteria for Skilled Interventions Met (PT) yes;meets criteria;skilled treatment is necessary  -CK     Therapy Frequency (PT) 2 times/day  -CK     Predicted Duration of Therapy Intervention (PT) 2 days  -CK       Row Name 03/13/25 1620          Vital Signs    Pre Systolic BP Rehab 143  -CK     Pre Treatment Diastolic BP 74  -CK     Pretreatment Heart Rate (beats/min) 81  -CK     Posttreatment Heart Rate (beats/min) 81  -CK     Pre SpO2 (%) 90  -CK     O2 Delivery Pre Treatment nasal cannula  -CK     Intra SpO2 (%) 91  -CK     O2 Delivery Intra Treatment nasal cannula  -CK     Post SpO2 (%) 90  -CK     O2 Delivery Post Treatment nasal cannula  -CK     Pre Patient Position Supine  -CK     Post Patient Position Sitting  -CK       Row Name 03/13/25 0493          Positioning and Restraints    Pre-Treatment Position in bed  -CK     Post Treatment Position chair  -CK     In Chair reclined;call light within reach;encouraged to call for assist;exit alarm on;notified nsg;heels elevated  -CK               User Key  (r) = Recorded By, (t) = Taken By, (c) = Cosigned By      Initials Name Provider Type    CK Julissa Preciado, PT Physical Therapist                   Outcome Measures       Row Name 03/13/25 0356          How much help from another person do you currently need...    Turning from your back to your side while in flat bed without using bedrails? 4  -CK     Moving from lying on back to sitting on  the side of a flat bed without bedrails? 4  -CK     Moving to and from a bed to a chair (including a wheelchair)? 4  -CK     Standing up from a chair using your arms (e.g., wheelchair, bedside chair)? 4  -CK     Climbing 3-5 steps with a railing? 3  -CK     To walk in hospital room? 3  -CK     AM-PAC 6 Clicks Score (PT) 22  -CK     Highest Level of Mobility Goal 7 --> Walk 25 feet or more  -CK       Row Name 03/13/25 1626          PADD    Diagnosis 1  -CK     Gender 2  -CK     Age Group 1  -CK     Gait Distance 1  -CK     Assist Level 2  -CK     Home Support 3  -CK     PADD Score 10  -CK     Patient Preference home with home health  -CK     Prediction by PADD Score directly home (with home health or out-patient rehab)  -CK       Row Name 03/13/25 1626          Functional Assessment    Outcome Measure Options AM-PAC 6 Clicks Basic Mobility (PT);PADD  -CK               User Key  (r) = Recorded By, (t) = Taken By, (c) = Cosigned By      Initials Name Provider Type    CK Julissa Preciado, RIA Physical Therapist                                 Physical Therapy Education       Title: PT OT SLP Therapies (Done)       Topic: Physical Therapy (Done)       Point: Mobility training (Done)       Learning Progress Summary            Patient Acceptance, E, VU by CK at 3/13/2025 1627                      Point: Home exercise program (Done)       Learning Progress Summary            Patient Acceptance, E, VU by CK at 3/13/2025 1627                      Point: Body mechanics (Done)       Learning Progress Summary            Patient Acceptance, E, VU by CK at 3/13/2025 1627                      Point: Precautions (Done)       Learning Progress Summary            Patient Acceptance, E, VU by CK at 3/13/2025 1627                                      User Key       Initials Effective Dates Name Provider Type Discipline    CK 02/06/24 -  Julissa Preciado PT Physical Therapist PT                  PT Recommendation and Plan  Planned  Therapy Interventions (PT): balance training, bed mobility training, gait training, home exercise program, neuromuscular re-education, patient/family education, postural re-education, ROM (range of motion), stair training, strengthening, stretching, transfer training  Outcome Evaluation: Patient presents with deficits in strength, ROM, and endurance. He was able to ambulate 350' SBA while on 2L O2 with SpO2 at 91%. Reviewed his surgeons postop exercise instructions and performed as able. Will plan to initiate flexion exercises next session. IPPT will follow while admitted. Recommend D/C home with HHPT when medically appropriate.     Time Calculation:   PT Evaluation Complexity  History, PT Evaluation Complexity: 3 or more personal factors and/or comorbidities  Examination of Body Systems (PT Eval Complexity): total of 3 or more elements  Clinical Presentation (PT Evaluation Complexity): stable  Clinical Decision Making (PT Evaluation Complexity): low complexity  Overall Complexity (PT Evaluation Complexity): low complexity     PT Charges       Row Name 03/13/25 1627             Time Calculation    Start Time 1535  -CK      PT Received On 03/13/25  -CK      PT Goal Re-Cert Due Date 03/23/25  -CK         Timed Charges    00858 - PT Therapeutic Exercise Minutes 10  -CK         Untimed Charges    PT Eval/Re-eval Minutes 48  -CK         Total Minutes    Timed Charges Total Minutes 10  -CK      Untimed Charges Total Minutes 48  -CK       Total Minutes 58  -CK                User Key  (r) = Recorded By, (t) = Taken By, (c) = Cosigned By      Initials Name Provider Type    CK Julissa Preciado, PT Physical Therapist                  Therapy Charges for Today       Code Description Service Date Service Provider Modifiers Qty    73936611767 HC PT THER PROC EA 15 MIN 3/13/2025 Julissa Preciado, PT GP 1    19815819162 HC PT EVAL LOW COMPLEXITY 4 3/13/2025 Julissa Preciado, PT GP 1            PT G-Codes  Outcome Measure  Options: AM-PAC 6 Clicks Basic Mobility (PT), PADD  AM-PAC 6 Clicks Score (PT): 22  PT Discharge Summary  Anticipated Discharge Disposition (PT): home with assist, home with home health    Julissa Preciado, PT  3/13/2025

## 2025-03-13 NOTE — ED PROVIDER NOTES
EMERGENCY DEPARTMENT ENCOUNTER    Pt Name: José Miguel Jerry  MRN: [id number]  Pt :   1950  Room Number:    Date of encounter:  3/12/2025  PCP: Hernandez Jackman MD  ED Provider: Vini Stokes MD    Historian: Patient      HPI:  Chief Complaint: Postoperative hypoxia        Context: José Miguel Jerry is a 74-year-old man history of obesity and obstructive sleep apnea who presents from the surgery center for postoperative hypoxia following a right total knee replacement.  Reportedly during extubation he was biting the tube and there was a possible aspiration event.  He was found to be hypoxic and was transferred here for evaluation upon arrival here he does feel short of breath.  He denies chest pain but does have a headache.  No other complaints at this time.       PAST MEDICAL HISTORY  Past Medical History:   Diagnosis Date    Abnormal stress test 2018    1. MPS 8-8-18: Positive EKG changes Reversible Inferior ischemia EF 60%      Depression     Dyspnea on exertion     Elevated cholesterol     Erectile dysfunction     History of echocardiogram 2018    History of exercise stress test 2018    History of pneumonia 2019    Reports 40+ years ago    Hormone disorder 2013    Hypogonadism    Hypertension     Prostate cancer 2012    Seasonal allergies     reports mild    Sleep apnea     Uses CPAP HS - instructed to bring in DOS    Urinary incontinence 2024    Wears glasses          PAST SURGICAL HISTORY  Past Surgical History:   Procedure Laterality Date    APPENDECTOMY      BLEPHAROPLASTY Bilateral     CARDIAC CATHETERIZATION N/A 2018    Procedure: Left Heart Cath;  Surgeon: Anastacia Gary MD;  Location:  [facility];  Service: Cardiology    CARDIOVASCULAR STRESS TEST  2021    CATARACT EXTRACTION, BILATERAL      CHOLECYSTECTOMY N/A 2021    Procedure: CHOLECYSTECTOMY LAPAROSCOPIC;  Surgeon: Caleb Edmond MD;  Location: [facility] OR;   Service: General;  Laterality: N/A;    COLONOSCOPY      HEAD/NECK LESION/CYST EXCISION Right 10/31/2016    Procedure: EXCISE BCCA RIGHT SCALP;  Surgeon: Lowell Rider MD;  Location:  [facility] OR;  Service:     NASAL SEPTUM SURGERY      PENILE PROSTHESIS IMPLANT N/A 11/28/2018    Procedure: PENILE PROSTHESIS PLACEMENT;  Surgeon: Nick Sommer MD;  Location: [facility] OR;  Service: Urology    PROSTATE SURGERY      SKIN BIOPSY      patient reports basal cell carcinoma removed from head x3 places    TEETH EXTRACTION           FAMILY HISTORY  Family History   Problem Relation Age of Onset    Hypertension Mother     Heart disease Mother         A fib    Kidney disease Father         Alcohol abuse    Liver disease Father     Heart disease Father     Hypertension Father     Heart attack Sister         Atrial fibrillation    Heart attack Brother     Heart disease Maternal Grandfather         Enlarged heart    Hypertension Maternal Grandfather     Heart attack Maternal Grandfather     Heart disease Paternal Grandfather     Hypertension Paternal Grandfather         Hypertension    Heart attack Paternal Grandfather     Heart disease Daughter          SOCIAL HISTORY  Social History     Socioeconomic History    Marital status:    Tobacco Use    Smoking status: Never     Passive exposure: Never    Smokeless tobacco: Never   Vaping Use    Vaping status: Never Used   Substance and Sexual Activity    Alcohol use: Yes     Alcohol/week: 2.0 standard drinks of alcohol     Types: 1 Glasses of wine, 1 Cans of beer per week     Comment: Social use, reports no HX of abuse    Drug use: Never    Sexual activity: Yes     Partners: Female     Birth control/protection: None         ALLERGIES  Chlorhexidine, Clindamycin/lincomycin, and Neosporin [bacitracin-polymyxin b]        REVIEW OF SYSTEMS  Review of Systems       All systems reviewed and negative except for those discussed in HPI.       PHYSICAL EXAM    I have reviewed the triage  vital signs and nursing notes.    ED Triage Vitals [03/12/25 1933]   Temp Heart Rate Resp BP SpO2   97.9 °F (36.6 °C) 80 18 139/76 91 %      Temp src Heart Rate Source Patient Position BP Location FiO2 (%)   Oral Monitor Lying Right arm --       Physical Exam  GENERAL:   Appears mildly dyspneic  HENT: Nares patent.  EYES: No scleral icterus.  CV: Regular rhythm, regular rate.  RESPIRATORY: Normal effort.  Bilateral breath sounds but rhonchi with end expiratory wheezing  ABDOMEN: Soft, nontender  MUSCULOSKELETAL: No deformities.   NEURO: Alert, moves all extremities, follows commands.  SKIN: Warm, dry, no rash visualized.      LAB RESULTS  Recent Results (from the past 24 hours)   ECG 12 Lead ED Triage Standing Order; SOA    Collection Time: 03/12/25  7:41 PM   Result Value Ref Range    QT Interval 402 ms    QTC Interval 460 ms   Comprehensive Metabolic Panel    Collection Time: 03/12/25  7:42 PM    Specimen: Blood   Result Value Ref Range    Glucose 161 (H) 65 - 99 mg/dL    BUN 18 8 - 23 mg/dL    Creatinine 1.13 0.76 - 1.27 mg/dL    Sodium 138 136 - 145 mmol/L    Potassium 3.9 3.5 - 5.2 mmol/L    Chloride 100 98 - 107 mmol/L    CO2 23.0 22.0 - 29.0 mmol/L    Calcium 8.4 (L) 8.6 - 10.5 mg/dL    Total Protein 6.0 6.0 - 8.5 g/dL    Albumin 3.7 3.5 - 5.2 g/dL    ALT (SGPT) 30 1 - 41 U/L    AST (SGOT) 26 1 - 40 U/L    Alkaline Phosphatase 84 39 - 117 U/L    Total Bilirubin 0.5 0.0 - 1.2 mg/dL    Globulin 2.3 gm/dL    A/G Ratio 1.6 g/dL    BUN/Creatinine Ratio 15.9 7.0 - 25.0    Anion Gap 15.0 5.0 - 15.0 mmol/L    eGFR 68.2 >60.0 mL/min/1.73   BNP    Collection Time: 03/12/25  7:42 PM    Specimen: Blood   Result Value Ref Range    proBNP 37.7 0.0 - 900.0 pg/mL   High Sensitivity Troponin T    Collection Time: 03/12/25  7:42 PM    Specimen: Blood   Result Value Ref Range    HS Troponin T 11 <22 ng/L   Glenn Top (Gel)    Collection Time: 03/12/25  7:42 PM   Result Value Ref Range    Extra Tube Hold for add-ons.    Lavender  Top    Collection Time: 03/12/25  7:42 PM   Result Value Ref Range    Extra Tube hold for add-on    Gold Top - SST    Collection Time: 03/12/25  7:42 PM   Result Value Ref Range    Extra Tube Hold for add-ons.    Gray Top    Collection Time: 03/12/25  7:42 PM   Result Value Ref Range    Extra Tube Hold for add-ons.    Light Blue Top    Collection Time: 03/12/25  7:42 PM   Result Value Ref Range    Extra Tube Hold for add-ons.    CBC Auto Differential    Collection Time: 03/12/25  7:42 PM    Specimen: Blood   Result Value Ref Range    WBC 10.53 3.40 - 10.80 10*3/mm3    RBC 6.20 (H) 4.14 - 5.80 10*6/mm3    Hemoglobin 18.1 (H) 13.0 - 17.7 g/dL    Hematocrit 56.1 (H) 37.5 - 51.0 %    MCV 90.5 79.0 - 97.0 fL    MCH 29.2 26.6 - 33.0 pg    MCHC 32.3 31.5 - 35.7 g/dL    RDW 15.6 (H) 12.3 - 15.4 %    RDW-SD 51.4 37.0 - 54.0 fl    MPV 10.3 6.0 - 12.0 fL    Platelets 166 140 - 450 10*3/mm3    Neutrophil % 94.3 (H) 42.7 - 76.0 %    Lymphocyte % 2.8 (L) 19.6 - 45.3 %    Monocyte % 2.3 (L) 5.0 - 12.0 %    Eosinophil % 0.0 (L) 0.3 - 6.2 %    Basophil % 0.1 0.0 - 1.5 %    Immature Grans % 0.5 0.0 - 0.5 %    Neutrophils, Absolute 9.94 (H) 1.70 - 7.00 10*3/mm3    Lymphocytes, Absolute 0.29 (L) 0.70 - 3.10 10*3/mm3    Monocytes, Absolute 0.24 0.10 - 0.90 10*3/mm3    Eosinophils, Absolute 0.00 0.00 - 0.40 10*3/mm3    Basophils, Absolute 0.01 0.00 - 0.20 10*3/mm3    Immature Grans, Absolute 0.05 0.00 - 0.05 10*3/mm3    nRBC 0.0 0.0 - 0.2 /100 WBC   D-dimer, Quantitative    Collection Time: 03/12/25  7:42 PM    Specimen: Blood   Result Value Ref Range    D-Dimer, Quantitative 2.39 (H) 0.00 - 0.74 MCGFEU/mL   Lactic Acid, Plasma    Collection Time: 03/12/25  7:42 PM    Specimen: Blood   Result Value Ref Range    Lactate 4.6 (C) 0.5 - 2.0 mmol/L   Procalcitonin    Collection Time: 03/12/25  7:42 PM    Specimen: Blood   Result Value Ref Range    Procalcitonin 0.03 0.00 - 0.25 ng/mL   C-reactive Protein    Collection Time: 03/12/25  7:42 PM     Specimen: Blood   Result Value Ref Range    C-Reactive Protein <0.30 0.00 - 0.50 mg/dL   Magnesium    Collection Time: 03/12/25  7:42 PM    Specimen: Blood   Result Value Ref Range    Magnesium 1.8 1.6 - 2.4 mg/dL   High Sensitivity Troponin T 1Hr    Collection Time: 03/12/25  8:58 PM    Specimen: Blood   Result Value Ref Range    HS Troponin T 10 <22 ng/L    Troponin T Numeric Delta -1 Abnormal if >/=3 ng/L       If labs were ordered, I independently reviewed the results and considered them in treating the patient.        RADIOLOGY  CT Angiogram Chest Pulmonary Embolism  Result Date: 3/12/2025  CT ANGIOGRAM CHEST PULMONARY EMBOLISM Date of Exam: 3/12/2025 8:37 PM EDT Indication: Acute hypoxia, tachycardia, surgery earlier today with possible aspiration event, positive dimer. Comparison: Chest x-ray March 12, 2025 Technique: Axial CT images were obtained of the chest after the uneventful intravenous administration of 75 cc of Isovue-370 utilizing pulmonary embolism protocol.  In addition, a 3-D volume rendered image was created for interpretation.  Reconstructed coronal and sagittal images were also obtained. Automated exposure control and iterative construction methods were used. Findings: A pulmonary embolus not definitely identified. The thoracic aorta does not appear unusual. There is coronary artery calcification. On evaluation lung windows there are some groundglass changes within the upper lobes to a lesser degree the right middle lobe and basilar areas where there is more focal airspace disease. This could relate to multifocal inflammatory infectious process. Findings associated with aspiration not entirely excluded. There are no pleural effusions. Lower slices through the upper abdomen reveal no acute findings. There are some degenerative changes involving the dorsal spine.     Impression: 1.No definite evidence for a pulmonary embolus. 2.There are some groundglass changes within the upper lobes and to a  lesser degree the right middle lobe in addition to basilar areas where there is more focal airspace disease. This could relate to multifocal inflammatory infectious process. Findings associated with aspiration not entirely excluded. 3.Coronary artery calcification. Electronically Signed: Heladio Justice MD  3/12/2025 8:53 PM EDT  Workstation ID: [id number]    XR Chest 1 View  Result Date: 3/12/2025  XR CHEST 1 VW Date of Exam: 3/12/2025 7:40 PM EDT Indication: SOA triage protocol Comparison: November 26, 2018 Findings: Heart not definitely enlarged. The lungs seem clear. There are no pleural effusions.     Impression: An acute pulmonary process is not apparent. Electronically Signed: Heladio Justice MD  3/12/2025 8:08 PM EDT  Workstation ID: [id number]      I ordered and independently reviewed the above noted radiographic studies.      I viewed images of chest x-ray which showed no acute pathology per my independent interpretation.  CT chest pulmonary embolism protocol showing groundglass opacities in the upper and middle lobes    See radiologist's dictation for official interpretation.        PROCEDURES    Procedures    ECG 12 Lead ED Triage Standing Order; SOA   Preliminary Result   Test Reason : ED Triage Standing Order~   Blood Pressure :   */*   mmHG   Vent. Rate :  79 BPM     Atrial Rate :  79 BPM      P-R Int : 164 ms          QRS Dur :  98 ms       QT Int : 402 ms       P-R-T Axes :  49 -52 -21 degrees     QTcB Int : 460 ms      Normal sinus rhythm   Left anterior fascicular block   Moderate voltage criteria for LVH, may be normal variant   Abnormal ECG   When compared with ECG of 27-Dec-2021 11:03,   QT has lengthened      Referred By: EDMD           Confirmed By:           MEDICATIONS GIVEN IN ER    Medications   sodium chloride 0.9 % flush 10 mL (has no administration in time range)   ipratropium-albuterol (DUO-NEB) nebulizer solution 3 mL (has no administration in time range)   sodium chloride 0.9 % bolus 1,000 mL  (has no administration in time range)   vancomycin IVPB 2000 mg in 0.9% Sodium Chloride 500 mL (has no administration in time range)   piperacillin-tazobactam (ZOSYN) 3.375 g IVPB in 100 mL NS MBP (CD) (has no administration in time range)   sodium chloride 0.9 % bolus 500 mL (500 mL Intravenous New Bag 3/12/25 1958)   ondansetron (ZOFRAN) injection 4 mg (4 mg Intravenous Given 3/12/25 1958)   ketorolac (TORADOL) injection 15 mg (15 mg Intravenous Given 3/12/25 1958)   acetaminophen (TYLENOL) tablet 1,000 mg (1,000 mg Oral Given 3/12/25 1959)   iopamidol (ISOVUE-370) 76 % injection 75 mL (75 mL Intravenous Given 3/12/25 2038)         MEDICAL DECISION MAKING, PROGRESS, and CONSULTS    All labs, if obtained, have been independently reviewed by me.  All radiology studies, if obtained, have been reviewed by me and the radiologist dictating the report.  All EKGs, if obtained, have been independently viewed and interpreted by me/my attending physician.      Discussion below represents my analysis of pertinent findings related to patient's condition, differential diagnosis, treatment plan and final disposition.                                          Differential diagnosis:    Pneumothorax, aspiration pneumonia, pulmonary embolism, myocardial infarction, CHF, COPD, asthma, sepsis, anemia, electrolyte abnormality      Additional sources:    - Discussed/ obtained information from independent historians: EMS    - External (non-ED) record review:  serrato review of outpatient pulmonology note shows history of:    TULIO (obstructive sleep apnea)    Obesity (BMI 30-39.9)    Other allergic rhinitis    - Chronic or social conditions impacting care: Obesity, sleep apnea        Orders placed during this visit:  Orders Placed This Encounter   Procedures    COVID PRE-OP / PRE-PROCEDURE SCREENING ORDER (NO ISOLATION) - Swab, Nasopharynx    Blood Culture - Blood,    Blood Culture - Blood,    COVID-19, FLU A/B, RSV PCR 1 HR TAT - Swab,  Nasopharynx    MRSA Screen, PCR (Inpatient) - Swab, Nares    XR Chest 1 View    CT Angiogram Chest Pulmonary Embolism    [place] Draw    Comprehensive Metabolic Panel    BNP    High Sensitivity Troponin T    CBC Auto Differential    Urinalysis With Microscopic If Indicated (No Culture) - Urine, Clean Catch    Blood Gas, Venous -With Co-Ox Panel: Yes    D-dimer, Quantitative    Lactic Acid, Plasma    Procalcitonin    C-reactive Protein    Magnesium    High Sensitivity Troponin T 1Hr    STAT Lactic Acid, Reflex    NPO Diet NPO Type: Strict NPO    Undress & Gown    Continuous Pulse Oximetry    Vital Signs    Oxygen Therapy- Nasal Cannula; Titrate 1-6 LPM Per SpO2; 90 - 95%    ECG 12 Lead ED Triage Standing Order; SOA    Insert Peripheral IV    CBC & Differential    Green Top (Gel)    Lavender Top    Gold Top - SST    Gray Top    Light Blue Top         Additional orders considered but not ordered:      ED Course:    Consultants: [facility] medicine    ED Course as of 03/12/25 2142   Wed Mar 12, 2025   1948 Chart review of outpatient pulmonology note shows history of:    TULIO (obstructive sleep apnea)    Obesity (BMI 30-39.9)    Other allergic rhinitis   [CC]   1948 This is a very nice 74-year-old man history of obesity and obstructive sleep apnea who presents from the surgery center for postoperative hypoxia following a right total knee replacement.  Reportedly during extubation he was biting the tube and there was a possible aspiration event.  He was found to be hypoxic and was transferred here for evaluation upon arrival here he does feel short of breath.  He denies chest pain but does have a headache.  No other complaints at this time. [CC]   1949 He arrived awake and alert but is hypoxic requiring 6 L nasal cannula to maintain sats in the 90s.  He has bilateral rhonchorous breath sounds with end expiratory wheezing no history of asthma or COPD but trying a nebulizer treatment.  He does have bilateral breath sounds but  I have concern for aspiration pneumonitis, pneumonia, pneumothorax etc. obtaining full respiratory and infectious workup as well as x-ray of the chest. [CC]   2140 Chest x-ray was clear.  CBC showing erythrocytosis but no leukocytosis metabolic panel showing hyperglycemia.  D-dimer was significantly elevated though confounded by the recent major surgery.  He does have a lactic acidemia no evidence of heart failure no cardiomegaly on imaging and no elevation in proBNP's have ordered another liter of IV fluids.  CT scan of the chest was added on which does not show pulmonary embolism but does show middle and upper lobe opacities consistent with likely aspiration pneumonia.  Upon reevaluation he continues to require 6 L of oxygen by nasal cannula.  Will need hospitalization and started on broad-spectrum antibiotics.  Medicine team consulted for admission. [CC]      ED Course User Index  [CC] Vini Stokes MD              Shared Decision Making:  After my consideration of clinical presentation and any laboratory/radiology studies obtained, I discussed the findings with the patient/patient representative who is in agreement with the treatment plan and the final disposition.   Risks and benefits of discharge and/or observation/admission were discussed.       AS OF 21:42 EDT VITALS:    BP - 139/76  HR - 80  TEMP - 97.9 °F (36.6 °C) (Oral)  O2 SATS - 91%                  DIAGNOSIS  Final diagnoses:   Acute respiratory failure with hypoxia   Aspiration pneumonia of right middle lobe, unspecified aspiration pneumonia type   Postoperative hypoxia   Lactic acidemia         DISPOSITION  Admit      Please note that portions of this document were completed with voice recognition software.        Vini Stokes MD  03/12/25 [id number]       Vini Stokes MD  03/12/25 1748

## 2025-03-13 NOTE — PROGRESS NOTES
[facility] Medicine Services  PROGRESS NOTE    Patient Name: José Miguel Jerry  : 1950  MRN: [id number]    Date of Admission: 3/12/2025  Primary Care Physician: Hernandez Jackman MD    Subjective   Subjective     CC:  F/U Aspiration PNA    HPI:  Patient seen and examined. Feeling better. On 2L NC. Reviewed labs. Walked with walker and tech to bathroom and back to bed.     Objective   Objective     Vital Signs:   Temp:  [97.6 °F (36.4 °C)-98 °F (36.7 °C)] 98 °F (36.7 °C)  Heart Rate:  [67-92] 92  Resp:  [18] 18  BP: (137-161)/(73-92) 156/74  Flow (L/min) (Oxygen Therapy):  [2-6] 2     Physical Exam:  Constitutional: No acute distress, awake, alert  HENT: NCAT, mucous membranes moist  Respiratory: Decreased in bases bilaterally, respiratory effort normal 2L NC  Cardiovascular: RRR, no murmurs, rubs, or gallops  Gastrointestinal: Positive bowel sounds, soft, nontender, nondistended  Musculoskeletal: RLE wrapped   Psychiatric: Appropriate affect, cooperative  Neurologic: Oriented x 3, no focal deficits, speech clear  Skin: No rashes     Results Reviewed:  LAB RESULTS:      Lab 25  0756 25  0159 254 25   WBC  --  10.57  --   --  10.53   HEMOGLOBIN  --  17.1  --   --  18.1*   HEMATOCRIT  --  54.1*  --   --  56.1*   PLATELETS  --  163  --   --  166   NEUTROS ABS  --  9.75*  --   --  9.94*   IMMATURE GRANS (ABS)  --  0.04  --   --  0.05   LYMPHS ABS  --  0.46*  --   --  0.29*   MONOS ABS  --  0.31  --   --  0.24   EOS ABS  --  0.00  --   --  0.00   MCV  --  91.1  --   --  90.5   CRP  --   --   --   --  <0.30   PROCALCITONIN  --   --   --   --  0.03   LACTATE 1.6 3.2* 3.9*  --  4.6*   D DIMER QUANT  --   --   --   --  2.39*   HSTROP T  --   --   --  10 11         Lab 25  0159 25   SODIUM 138 138   POTASSIUM 4.6 3.9   CHLORIDE 102 100   CO2 23.0 23.0   ANION GAP 13.0 15.0   BUN 16 18   CREATININE 1.06 1.13   EGFR 73.6  68.2   GLUCOSE 139* 161*   CALCIUM 8.2* 8.4*   MAGNESIUM  --  1.8   HEMOGLOBIN A1C 5.70*  --          Lab 03/12/25 1942   TOTAL PROTEIN 6.0   ALBUMIN 3.7   GLOBULIN 2.3   ALT (SGPT) 30   AST (SGOT) 26   BILIRUBIN 0.5   ALK PHOS 84         Lab 03/12/25 2058 03/12/25 1942   PROBNP  --  37.7   HSTROP T 10 11                 Lab 03/12/25 2205   FIO2 21   CARBOXYHEMOGLOBIN (VENOUS) 1.3     Brief Urine Lab Results  (Last result in the past 365 days)        Color   Clarity   Blood   Leuk Est   Nitrite   Protein   CREAT   Urine HCG        03/12/25 2202 Yellow   Clear   Negative   Negative   Negative   Negative                   Microbiology Results Abnormal       None            CT Angiogram Chest Pulmonary Embolism  Result Date: 3/12/2025  CT ANGIOGRAM CHEST PULMONARY EMBOLISM Date of Exam: 3/12/2025 8:37 PM EDT Indication: Acute hypoxia, tachycardia, surgery earlier today with possible aspiration event, positive dimer. Comparison: Chest x-ray March 12, 2025 Technique: Axial CT images were obtained of the chest after the uneventful intravenous administration of 75 cc of Isovue-370 utilizing pulmonary embolism protocol.  In addition, a 3-D volume rendered image was created for interpretation.  Reconstructed coronal and sagittal images were also obtained. Automated exposure control and iterative construction methods were used. Findings: A pulmonary embolus not definitely identified. The thoracic aorta does not appear unusual. There is coronary artery calcification. On evaluation lung windows there are some groundglass changes within the upper lobes to a lesser degree the right middle lobe and basilar areas where there is more focal airspace disease. This could relate to multifocal inflammatory infectious process. Findings associated with aspiration not entirely excluded. There are no pleural effusions. Lower slices through the upper abdomen reveal no acute findings. There are some degenerative changes involving the  dorsal spine.     Impression: Impression: 1.No definite evidence for a pulmonary embolus. 2.There are some groundglass changes within the upper lobes and to a lesser degree the right middle lobe in addition to basilar areas where there is more focal airspace disease. This could relate to multifocal inflammatory infectious process. Findings associated with aspiration not entirely excluded. 3.Coronary artery calcification. Electronically Signed: Heladio Justice MD  3/12/2025 8:53 PM EDT  Workstation ID: [id number]    XR Chest 1 View  Result Date: 3/12/2025  XR CHEST 1 VW Date of Exam: 3/12/2025 7:40 PM EDT Indication: SOA triage protocol Comparison: November 26, 2018 Findings: Heart not definitely enlarged. The lungs seem clear. There are no pleural effusions.     Impression: Impression: An acute pulmonary process is not apparent. Electronically Signed: Heladio Justice MD  3/12/2025 8:08 PM EDT  Workstation ID: [id number]          Current medications:  Scheduled Meds:[START ON 3/14/2025] Pharmacy Consult, , Not Applicable, Once  atorvastatin, 10 mg, Oral, Nightly  levothyroxine, 50 mcg, Oral, QAM  lisinopril, 40 mg, Oral, Q24H  metoprolol succinate XL, 25 mg, Oral, Daily  montelukast, 10 mg, Oral, Nightly  multivitamin with minerals, 1 tablet, Oral, Daily  nortriptyline, 30 mg, Oral, Nightly  pantoprazole, 40 mg, Oral, Q AM  piperacillin-tazobactam, 3.375 g, Intravenous, Q8H  senna-docusate sodium, 2 tablet, Oral, BID  sodium chloride, 10 mL, Intravenous, Q12H      Continuous Infusions:Pharmacy to dose vancomycin,       PRN Meds:.  acetaminophen **OR** acetaminophen **OR** acetaminophen    senna-docusate sodium **AND** polyethylene glycol **AND** bisacodyl **AND** bisacodyl    ipratropium-albuterol    nitroglycerin    ondansetron ODT **OR** ondansetron    oxyCODONE-acetaminophen    Pharmacy to dose vancomycin    simethicone    sodium chloride    sodium chloride    sodium chloride    traMADol    Assessment & Plan   Assessment &  Plan     Active Hospital Problems    Diagnosis  POA    **Aspiration pneumonia [J69.0]  Yes    Sleep apnea [G47.30]  Yes    History of prostate cancer [Z85.46]  Not Applicable    CAD (coronary artery disease) [I25.10]  Yes    Dyslipidemia [E78.5]  Yes    Essential hypertension [I10]  Yes    GERD (gastroesophageal reflux disease) [K21.9]  Yes      Resolved Hospital Problems   No resolved problems to display.        Brief Hospital Course to date:  José Miguel Jerry is a 74 y.o. male  with a past medical history significant for essential hypertension, hyperlipidemia, CAD, obstructive sleep apnea, prostate cancer s/p biotic prostatectomy in 2013 and hypothyroidism presents to the ED from outpatient surgery center due to hypoxia following a right total knee replacement.  Reportedly during extubation the patient was biting the tube and there was a possible aspiration event.  He was found to be hypoxic and transferred to [facility] ED for further evaluation.     This patient's problems and plans were partially entered by my partner and updated as appropriate by me 03/13/25.   All problems are new to me today    Aspiration pneumonia  Hypoxia   -CTA chest negative for PE  -On 2L NC, wean as tolerated to RA, does not wear at baseline  -Continue Zosyn  -DC Vanc, MRSA PCR negative   -Strep pneumo, legionella negative   -Blood Cx pending    Lactic acidosis, clinically significant  -resolved     S/p right total knee replacement  -ortho consulted, surgery per Dr Bond   -continue tramadol and percocet   -PT/OT     Obstructive sleep apnea  -BIPAP at night      Essential hypertension   -continue benazepril, metoprolol  -hold HCTZ for now       Hyperlipidemia  -continue statin      GERD  -PPI      Hypothyroidism   -continue synthroid     Pre-DM  -Hga1c 5.7    Expected Discharge Location and Transportation: Home  Expected Discharge   Expected Discharge Date: 3/14/2025; Expected Discharge Time:      VTE  Prophylaxis:  Mechanical VTE prophylaxis orders are present.         AM-PAC 6 Clicks Score (PT): 10 (03/13/25 0059)    CODE STATUS:   Code Status and Medical Interventions: CPR (Attempt to Resuscitate); Full Support   Ordered at: 03/12/25 5763     Code Status (Patient has no pulse and is not breathing):    CPR (Attempt to Resuscitate)     Medical Interventions (Patient has pulse or is breathing):    Full Support     Level Of Support Discussed With:    Patient       January D Gareth, DO  03/13/25

## 2025-03-14 VITALS
SYSTOLIC BLOOD PRESSURE: 155 MMHG | WEIGHT: 222.66 LBS | DIASTOLIC BLOOD PRESSURE: 76 MMHG | RESPIRATION RATE: 18 BRPM | TEMPERATURE: 98.3 F | HEIGHT: 73 IN | HEART RATE: 80 BPM | OXYGEN SATURATION: 84 % | BODY MASS INDEX: 29.51 KG/M2

## 2025-03-14 LAB
ANION GAP SERPL CALCULATED.3IONS-SCNC: 10 MMOL/L (ref 5–15)
BUN SERPL-MCNC: 15 MG/DL (ref 8–23)
BUN/CREAT SERPL: 20 (ref 7–25)
CALCIUM SPEC-SCNC: 8.2 MG/DL (ref 8.6–10.5)
CHLORIDE SERPL-SCNC: 102 MMOL/L (ref 98–107)
CO2 SERPL-SCNC: 23 MMOL/L (ref 22–29)
CREAT SERPL-MCNC: 0.75 MG/DL (ref 0.76–1.27)
DEPRECATED RDW RBC AUTO: 51.7 FL (ref 37–54)
EGFRCR SERPLBLD CKD-EPI 2021: 94.7 ML/MIN/1.73
ERYTHROCYTE [DISTWIDTH] IN BLOOD BY AUTOMATED COUNT: 15.4 % (ref 12.3–15.4)
GLUCOSE SERPL-MCNC: 85 MG/DL (ref 65–99)
HCT VFR BLD AUTO: 50.4 % (ref 37.5–51)
HGB BLD-MCNC: 15.9 G/DL (ref 13–17.7)
MCH RBC QN AUTO: 28.9 PG (ref 26.6–33)
MCHC RBC AUTO-ENTMCNC: 31.5 G/DL (ref 31.5–35.7)
MCV RBC AUTO: 91.5 FL (ref 79–97)
PLATELET # BLD AUTO: 134 10*3/MM3 (ref 140–450)
PMV BLD AUTO: 10 FL (ref 6–12)
POTASSIUM SERPL-SCNC: 4.1 MMOL/L (ref 3.5–5.2)
RBC # BLD AUTO: 5.51 10*6/MM3 (ref 4.14–5.8)
SODIUM SERPL-SCNC: 135 MMOL/L (ref 136–145)
WBC NRBC COR # BLD AUTO: 10.29 10*3/MM3 (ref 3.4–10.8)

## 2025-03-14 PROCEDURE — 85027 COMPLETE CBC AUTOMATED: CPT | Performed by: FAMILY MEDICINE

## 2025-03-14 PROCEDURE — 94799 UNLISTED PULMONARY SVC/PX: CPT

## 2025-03-14 PROCEDURE — 97116 GAIT TRAINING THERAPY: CPT

## 2025-03-14 PROCEDURE — 97110 THERAPEUTIC EXERCISES: CPT

## 2025-03-14 PROCEDURE — 94660 CPAP INITIATION&MGMT: CPT

## 2025-03-14 PROCEDURE — 97165 OT EVAL LOW COMPLEX 30 MIN: CPT

## 2025-03-14 PROCEDURE — 80048 BASIC METABOLIC PNL TOTAL CA: CPT

## 2025-03-14 PROCEDURE — 99239 HOSP IP/OBS DSCHRG MGMT >30: CPT | Performed by: FAMILY MEDICINE

## 2025-03-14 PROCEDURE — 97530 THERAPEUTIC ACTIVITIES: CPT

## 2025-03-14 PROCEDURE — 25010000002 PIPERACILLIN SOD-TAZOBACTAM PER 1 G: Performed by: NURSE PRACTITIONER

## 2025-03-14 RX ORDER — VENLAFAXINE HYDROCHLORIDE 75 MG/1
225 CAPSULE, EXTENDED RELEASE ORAL
Status: DISCONTINUED | OUTPATIENT
Start: 2025-03-14 | End: 2025-03-14 | Stop reason: HOSPADM

## 2025-03-14 RX ADMIN — LISINOPRIL 40 MG: 40 TABLET ORAL at 09:23

## 2025-03-14 RX ADMIN — SENNOSIDES, DOCUSATE SODIUM 2 TABLET: 50; 8.6 TABLET, FILM COATED ORAL at 09:23

## 2025-03-14 RX ADMIN — TRAMADOL HYDROCHLORIDE 50 MG: 50 TABLET, COATED ORAL at 09:23

## 2025-03-14 RX ADMIN — OXYCODONE HYDROCHLORIDE AND ACETAMINOPHEN 1 TABLET: 5; 325 TABLET ORAL at 03:05

## 2025-03-14 RX ADMIN — METOPROLOL SUCCINATE 25 MG: 25 TABLET, EXTENDED RELEASE ORAL at 09:23

## 2025-03-14 RX ADMIN — PANTOPRAZOLE SODIUM 40 MG: 40 TABLET, DELAYED RELEASE ORAL at 05:32

## 2025-03-14 RX ADMIN — PIPERACILLIN AND TAZOBACTAM 3.38 G: 3; .375 INJECTION, POWDER, LYOPHILIZED, FOR SOLUTION INTRAVENOUS at 03:05

## 2025-03-14 RX ADMIN — LEVOTHYROXINE SODIUM 50 MCG: 0.05 TABLET ORAL at 05:32

## 2025-03-14 RX ADMIN — Medication 1 TABLET: at 09:23

## 2025-03-14 NOTE — PLAN OF CARE
Goal Outcome Evaluation:  Plan of Care Reviewed With: patient                 Patient continues with 02 1.5L NC 92%,, ambulating well with walker and stand by assist. Wife at bedside intermittently. Hopeful for d/c home tomorrow, will continue to monitor.

## 2025-03-14 NOTE — DISCHARGE PLACEMENT REQUEST
"José Miguel Jerry (74 y.o. Male)       Carlene Sagastume [facility]    [phone]      Pts room air sat is 85%      Date of Birth   1950    Social Security Number       Address   [address] [facility] [address]    Home Phone   [phone]    MRN   [id number]       Jewish   None    Marital Status                               Admission Date   3/12/2025    Admission Type   Emergency    Admitting Provider   January Servin DO    Attending Provider   January Servin DO    Department, Room/Bed   [address], S381/1       Discharge Date       Discharge Disposition       Discharge Destination                                 Attending Provider: January Servin DO    Allergies: Chlorhexidine, Clindamycin/lincomycin, Neosporin [Bacitracin-polymyxin B]    Isolation: None   Infection: None   Code Status: CPR    Ht: 185.4 cm (72.99\")   Wt: 101 kg (222 lb 10.6 oz)    Admission Cmt: None   Principal Problem: Aspiration pneumonia [J69.0]                   Active Insurance as of 3/12/2025       Primary Coverage       Payor Plan Insurance Group Employer/Plan Group    [employer] MEDICARE REPLACEMENT [employer] MEDICARE ADVANTAGE GROUP [id number]       Payor Plan Address Payor Plan Phone Number Payor Plan Fax Number Effective Dates    [address] [address]   2023 - None Entered    [facility] [id number]         Subscriber Name Subscriber Birth Date Member ID       JOSÉ MIGUEL JERRY 1950 [id number]                     Emergency Contacts        (Rel.) Home Phone Work Phone Mobile Phone    Genna Jerry (Spouse) -- -- [phone]             [address]  [address] [place] [address]  Dept. Phone:  [phone]  Dept. Fax:  [phone] Date Ordered: Mar 14, 2025         Patient:  José Miguel Jerry MRN:  [id number]   [address] [facility] [address] :  1950  SSN:    Phone: [phone] Sex:  M     Weight: 101 kg " "(222 lb 10.6 oz)         Ht Readings from Last 1 Encounters:   25 185.4 cm (72.99\")         Oxygen Therapy         (Order ID: [id number])    Diagnosis:  Aspiration pneumonia of right middle lobe, unspecified aspiration pneumonia type (J69.0 [ICD-10-CM] 507.0 [ICD-9-CM])  Obstructive sleep apnea syndrome (G47.33 [ICD-10-CM] 327.23 [ICD-9-CM])   Quantity:  1     Oxygen Requirement: Continuous (Awake & Asleep)  Qualification for Continuous Home Oxygen: Oxygen Saturation <=88% at Rest (Awake) on Room Air  Delivery Modality: Nasal Cannula  Oxygen Flow Rate (LPM): 2  Duration: Continuous  Equipment:  Oxygen Concentrator &  &  Portable Gaseous Oxygen System & Portable Oxygen Contents Gaseous &  Conserving Regulator  Provider Review Status: Provider has Reviewed Oxygen Saturation and Testing  Face to Face Evaluation Date: 3/14/2025  Which [employer] company is this being sent to? [facility] [[id number]]  Length of Need: 3 Months        Authorizing Provider's Phone: [phone]  Verbal Order Mode: Verbal with readback   Authorizing Provider: January Servin DO  Authorizing Provider's NPI: [id number]     Order Entered By: Carlene Sagastume RN 3/14/2025 10:45 AM     Electronically signed by:         Insurance Information                  UNITED [employer] MEDICARE REPLACEMENT/[facility] MEDICARE ADVANTAGE GROUP Phone: --    Subscriber: José Miguel Jerry Subscriber#: [id number]    Group#: [id number] Precert#: --    Authorization#: [id number] Effective Date: --             History & Physical        Mechelle Pierce APRN at 25 2231       Attestation signed by Isidoro Hairston MD at 25 [phone]    I have reviewed this documentation and agree.                      [facility] Medicine Services  HISTORY AND PHYSICAL    Patient Name: José Miguel Jerry  : 1950  MRN: [id number]  Primary Care Physician: Hernandez Jackman MD  Date of admission: 3/12/2025    Subjective  Subjective "     Chief Complaint:  Hypoxia     HPI:  José Miguel Jerry is a 74 y.o. male with a past medical history significant for essential hypertension, hyperlipidemia, CAD, obstructive sleep apnea, prostate cancer s/p biotic prostatectomy in 2013 and hypothyroidism presents to the ED from outpatient surgery center due to hypoxia following a right total knee replacement.  Reportedly during extubation the patient was biting the tube and there was a possible aspiration event.  He was found to be hypoxic and transferred to [facility] ED for further evaluation.  Upon arrival patient does state he feels short of breath at times.  He is currently requiring 6 L of oxygen via nasal cannula with O2 saturation between 90 to 93%.  Initially he reports coughing up blood-tinged sputum.  He denies any nausea, abdominal pain, chest pain or dysuria.  CTA of chest obtained tonight does show groundglass changes in the upper lobes and to a lesser degree the right middle lobe in addition to basilar areas where there is more focal airspace disease.  This could relate to multifocal inflammatory infectious process.  Findings associated with aspiration not entirely excluded.        Review of Systems   Constitutional:  Negative for activity change, appetite change, chills, diaphoresis, fatigue, fever and unexpected weight change.   HENT: Negative.     Eyes:  Negative for photophobia and visual disturbance.   Respiratory:  Positive for cough and shortness of breath.    Cardiovascular:  Negative for chest pain, palpitations and leg swelling.   Gastrointestinal: Negative.    Genitourinary: Negative.    Musculoskeletal: Negative.    Skin: Negative.    Neurological: Negative.    Psychiatric/Behavioral: Negative.                  Personal History     Past Medical History:   Diagnosis Date    Abnormal stress test 08/14/2018    1. MPS 8-8-18: Positive EKG changes Reversible Inferior ischemia EF 60%      Depression     Dyspnea on exertion      Elevated cholesterol     Erectile dysfunction     History of echocardiogram 08/2018    History of exercise stress test 08/2018    History of pneumonia 03/21/2019    Reports 40+ years ago    Hormone disorder 2013    Hypogonadism    Hypertension     Prostate cancer 2012    Seasonal allergies     reports mild    Sleep apnea     Uses CPAP HS - instructed to bring in DOS    Urinary incontinence June 2024    Wears glasses          Oncology Problem List:  Prostate cancer (06/22/2016; Status: Active)  Oncology/Hematology History    No history exists.       Past Surgical History:   Procedure Laterality Date    APPENDECTOMY      BLEPHAROPLASTY Bilateral     CARDIAC CATHETERIZATION N/A 08/20/2018    Procedure: Left Heart Cath;  Surgeon: Anastacia Gary MD;  Location:  [facility];  Service: Cardiology    CARDIOVASCULAR STRESS TEST  12/14/2021    CATARACT EXTRACTION, BILATERAL      CHOLECYSTECTOMY N/A 12/29/2021    Procedure: CHOLECYSTECTOMY LAPAROSCOPIC;  Surgeon: Caleb Edmond MD;  Location:  [facility] OR;  Service: General;  Laterality: N/A;    COLONOSCOPY      HEAD/NECK LESION/CYST EXCISION Right 10/31/2016    Procedure: EXCISE BCCA RIGHT SCALP;  Surgeon: Lowell Rider MD;  Location:  [facility];  Service:     NASAL SEPTUM SURGERY      PENILE PROSTHESIS IMPLANT N/A 11/28/2018    Procedure: PENILE PROSTHESIS PLACEMENT;  Surgeon: Nick Smomer MD;  Location:  [facility];  Service: Urology    PROSTATE SURGERY      SKIN BIOPSY      patient reports basal cell carcinoma removed from head x3 places    TEETH EXTRACTION         Family History:  family history includes Heart attack in his brother, maternal grandfather, paternal grandfather, and sister; Heart disease in his daughter, father, maternal grandfather, mother, and paternal grandfather; Hypertension in his father, maternal grandfather, mother, and paternal grandfather; Kidney disease in his father; Liver disease in his father.     Social History:   reports that he has never smoked. He has never been exposed to tobacco smoke. He has never used smokeless tobacco. He reports current alcohol use of about 2.0 standard drinks of alcohol per week. He reports that he does not use drugs.  Social History     Social History Narrative    Not on file       Medications:  Azelastine HCl, ISOtretinoin, Lifitegrast, Vibegron, aspirin, benazepril, esomeprazole, hydroCHLOROthiazide, levothyroxine, metoprolol succinate XL, metroNIDAZOLE, montelukast, multivitamin with minerals, mupirocin, nortriptyline, simethicone, simvastatin, and venlafaxine    Allergies   Allergen Reactions    Chlorhexidine Rash    Clindamycin/Lincomycin Rash     itching    Neosporin [Bacitracin-Polymyxin B] Itching and Rash       Objective  Objective     Vital Signs:   Temp:  [97.9 °F (36.6 °C)] 97.9 °F (36.6 °C)  Heart Rate:  [80-82] 82  Resp:  [18] 18  BP: (139)/(76) 139/76  Flow (L/min) (Oxygen Therapy):  [6] 6    Physical Exam  Vitals and nursing note reviewed.   Constitutional:       General: He is not in acute distress.     Appearance: Normal appearance. He is not ill-appearing, toxic-appearing or diaphoretic.   HENT:      Head: Normocephalic.      Nose: Nose normal.      Mouth/Throat:      Mouth: Mucous membranes are dry.      Pharynx: Oropharynx is clear.   Eyes:      Extraocular Movements: Extraocular movements intact.      Conjunctiva/sclera: Conjunctivae normal.      Pupils: Pupils are equal, round, and reactive to light.   Cardiovascular:      Rate and Rhythm: Normal rate and regular rhythm.      Pulses: Normal pulses.      Heart sounds: Normal heart sounds.   Pulmonary:      Effort: Pulmonary effort is normal.      Breath sounds: Rhonchi present.   Abdominal:      General: Bowel sounds are normal. There is no distension.      Palpations: Abdomen is soft. There is no mass.      Tenderness: There is no abdominal tenderness. There is no right CVA tenderness, left CVA tenderness, guarding or  rebound.      Hernia: No hernia is present.   Musculoskeletal:         General: No swelling, tenderness, deformity or signs of injury.      Cervical back: Normal range of motion and neck supple.      Right lower leg: No edema.      Left lower leg: No edema.   Skin:     General: Skin is warm and dry.   Neurological:      General: No focal deficit present.      Mental Status: He is alert and oriented to person, place, and time. Mental status is at baseline.   Psychiatric:         Mood and Affect: Mood normal.         Behavior: Behavior normal.         Thought Content: Thought content normal.         Judgment: Judgment normal.            Result Review:  I have personally reviewed the results from the time of this admission to 3/12/2025 22:31 EDT and agree with these findings:  [x]  Laboratory list / accordion  [x]  Microbiology  [x]  Radiology  [x]  EKG/Telemetry   []  Cardiology/Vascular   []  Pathology  []  Old records  []  Other:  Most notable findings include:     LAB RESULTS:      Lab 03/12/25 1942   WBC 10.53   HEMOGLOBIN 18.1*   HEMATOCRIT 56.1*   PLATELETS 166   NEUTROS ABS 9.94*   IMMATURE GRANS (ABS) 0.05   LYMPHS ABS 0.29*   MONOS ABS 0.24   EOS ABS 0.00   MCV 90.5   CRP <0.30   PROCALCITONIN 0.03   LACTATE 4.6*   D DIMER QUANT 2.39*         Lab 03/12/25 1942   SODIUM 138   POTASSIUM 3.9   CHLORIDE 100   CO2 23.0   ANION GAP 15.0   BUN 18   CREATININE 1.13   EGFR 68.2   GLUCOSE 161*   CALCIUM 8.4*   MAGNESIUM 1.8         Lab 03/12/25 1942   TOTAL PROTEIN 6.0   ALBUMIN 3.7   GLOBULIN 2.3   ALT (SGPT) 30   AST (SGOT) 26   BILIRUBIN 0.5   ALK PHOS 84         Lab 03/12/25 2058 03/12/25 1942   PROBNP  --  37.7   HSTROP T 10 11                 Lab 03/12/25 2205   FIO2 21   CARBOXYHEMOGLOBIN (VENOUS) 1.3     Brief Urine Lab Results  (Last result in the past 365 days)        Color   Clarity   Blood   Leuk Est   Nitrite   Protein   CREAT   Urine HCG        03/12/25 2202 Yellow   Clear   Negative   Negative    Negative   Negative                 Microbiology Results (last 10 days)       Procedure Component Value - Date/Time    COVID PRE-OP / PRE-PROCEDURE SCREENING ORDER (NO ISOLATION) - Swab, Nasopharynx [[id number]]  (Normal) Collected: 03/12/25 2058    Lab Status: Final result Specimen: Swab from Nasopharynx Updated: 03/12/25 2206    Narrative:      The following orders were created for panel order COVID PRE-OP / PRE-PROCEDURE SCREENING ORDER (NO ISOLATION) - Swab, Nasopharynx.  Procedure                               Abnormality         Status                     ---------                               -----------         ------                     COVID-19, FLU A/B, RSV P...[[id number]]  Normal              Final result                 Please view results for these tests on the individual orders.    COVID-19, FLU A/B, RSV PCR 1 HR TAT - Swab, Nasopharynx [[id number]]  (Normal) Collected: 03/12/25 2058    Lab Status: Final result Specimen: Swab from Nasopharynx Updated: 03/12/25 2206     COVID19 Not Detected     Influenza A PCR Not Detected     Influenza B PCR Not Detected     RSV, PCR Not Detected    Narrative:      Fact sheet for providers: https://www.fda.gov/media/828661/download    Fact sheet for patients: https://www.fda.gov/media/008825/download    Test performed by PCR.            CT Angiogram Chest Pulmonary Embolism  Result Date: 3/12/2025  CT ANGIOGRAM CHEST PULMONARY EMBOLISM Date of Exam: 3/12/2025 8:37 PM EDT Indication: Acute hypoxia, tachycardia, surgery earlier today with possible aspiration event, positive dimer. Comparison: Chest x-ray March 12, 2025 Technique: Axial CT images were obtained of the chest after the uneventful intravenous administration of 75 cc of Isovue-370 utilizing pulmonary embolism protocol.  In addition, a 3-D volume rendered image was created for interpretation.  Reconstructed coronal and sagittal images were also obtained. Automated exposure control and iterative construction  methods were used. Findings: A pulmonary embolus not definitely identified. The thoracic aorta does not appear unusual. There is coronary artery calcification. On evaluation lung windows there are some groundglass changes within the upper lobes to a lesser degree the right middle lobe and basilar areas where there is more focal airspace disease. This could relate to multifocal inflammatory infectious process. Findings associated with aspiration not entirely excluded. There are no pleural effusions. Lower slices through the upper abdomen reveal no acute findings. There are some degenerative changes involving the dorsal spine.     Impression: Impression: 1.No definite evidence for a pulmonary embolus. 2.There are some groundglass changes within the upper lobes and to a lesser degree the right middle lobe in addition to basilar areas where there is more focal airspace disease. This could relate to multifocal inflammatory infectious process. Findings associated with aspiration not entirely excluded. 3.Coronary artery calcification. Electronically Signed: Heladio Justice MD  3/12/2025 8:53 PM EDT  Workstation ID: [id number]    XR Chest 1 View  Result Date: 3/12/2025  XR CHEST 1 VW Date of Exam: 3/12/2025 7:40 PM EDT Indication: SOA triage protocol Comparison: November 26, 2018 Findings: Heart not definitely enlarged. The lungs seem clear. There are no pleural effusions.     Impression: Impression: An acute pulmonary process is not apparent. Electronically Signed: Heladio Justice MD  3/12/2025 8:08 PM EDT  Workstation ID: [id number]          Assessment & Plan  Assessment & Plan       Aspiration pneumonia    Essential hypertension    Dyslipidemia    GERD (gastroesophageal reflux disease)    CAD (coronary artery disease)    Sleep apnea    History of prostate cancer      74 year old male presents to the ED from outpatient surgery center due to hypoxia.     1) aspiration pneumonia      Acute respiratory failure with hypoxia  - CTA  chest mentioned above  - Blood cultures x 2 pending  - Continue vancomycin and Zosyn  - Currently requiring 6 L of oxygen via nasal cannula with O2 saturation between 90 and 93%  - Sputum culture  - Check urine antigens  - Check MRSA PCR  - Continuous pulse ox  - DuoNebs    2) S/p right total knee replacement  -ortho consult in am   -continue tramadol and percocet     3) obstructive sleep apnea  -BIPAP at night   -continuous pulse ox     4) Essential hypertension   -continue benazepril, metoprolol  -hold HCTZ for now      5) Hyperlipidemia  -continue statin     6) GERD  -PPI     7) Hypothyroidism   -continue synthroid       DVT prophylaxis:  scds    CODE STATUS:  Full Code        Expected Discharge  TBD     This note has been completed as part of a split-shared workflow.     Signature: Electronically signed by ERENDIRA Partida, 03/12/25, 10:40 PM EDT.                  Electronically signed by Isidoro Hairston MD at 03/13/25 0351       Vital Signs (last day)       Date/Time Temp Temp src Pulse Resp BP Patient Position SpO2    03/14/25 0732 -- -- -- -- -- -- 84    03/14/25 0715 98.4 (36.9) Axillary 77 18 152/79 Lying 93    03/14/25 0453 98.1 (36.7) Oral 80 18 143/72 Lying 97    03/14/25 0106 -- -- 81 -- -- -- 91    03/14/25 0041 98.1 (36.7) Oral 75 18 120/57 Lying 85    03/13/25 1847 98 (36.7) Oral -- 18 150/87 Lying 90    03/13/25 1459 -- -- 66 -- -- -- 90    03/13/25 1448 98.3 (36.8) Oral 71 18 143/74 Lying 89    03/13/25 1033 98 (36.7) Oral 92 18 156/74 Lying 91    03/13/25 0815 -- -- 67 -- 140/73 -- --    03/13/25 0725 97.6 (36.4) Oral 71 18 140/73 Lying 95    03/13/25 0358 97.8 (36.6) Oral 80 18 142/76 Lying 92    03/13/25 0100 97.6 (36.4) Oral 81 18 161/89 Lying 95    03/13/25 0030 -- -- 76 -- 137/77 -- 93          Oxygen Therapy (last day)       Date/Time SpO2 Device (Oxygen Therapy) Flow (L/min) (Oxygen Therapy) Oxygen Concentration (%) ETCO2 (mmHg)    03/14/25 0732 84 room air -- -- --    03/14/25 0715  93 -- -- -- --    03/14/25 0453 97 NPPV/NIV -- -- --    03/14/25 0230 -- -- -- 35 --    03/14/25 0106 91 NPPV/NIV  -- 35  --    03/14/25 0041 85 -- -- -- --    03/13/25 2000 -- nasal cannula -- 2 --    03/13/25 1847 90 nasal cannula -- -- --    03/13/25 1459 90 -- -- -- --    03/13/25 1448 89 -- -- -- --    03/13/25 1033 91 -- -- -- --    03/13/25 0856 -- nasal cannula 2 -- --    03/13/25 0725 95 -- -- -- --    03/13/25 0600 -- nasal cannula 4 -- --    03/13/25 0400 -- nasal cannula 4 -- --    03/13/25 0358 92 -- -- -- --    03/13/25 0100 95 nasal cannula 4 -- --    03/13/25 0030 93 -- 4 -- --

## 2025-03-14 NOTE — PLAN OF CARE
Goal Outcome Evaluation:  Plan of Care Reviewed With: patient, spouse        Progress: no change (Initial Eval)  Outcome Evaluation: Patient presenting below his functional baseline w/ mobility, transfers and balance. Pt requiring increased risk for ADLs. Pt limited by R knee pain and O2 demands. Deficits warrant skilled OT services. Recommend home w/ assist when medically appropriate for d/c.    Anticipated Discharge Disposition (OT): home with assist

## 2025-03-14 NOTE — DISCHARGE PLACEMENT REQUEST
"José Miguel Jerry (74 y.o. Male)       Pt to be discharged home today. DC summary to follow            Date of Birth   1950    Social Security Number       [address]   [address] [facility] [address]    Home Phone   [phone]    MRN   [id number]       Catholic   None    Marital Status                               Admission Date   3/12/2025    Admission Type   Emergency    Admitting Provider   January Servin DO    Attending Provider   January Servin DO    Department, Room/Bed   [address], [address]/1       Discharge Date       Discharge Disposition       Discharge Destination                                 Attending Provider: January Servin DO    Allergies: Chlorhexidine, Clindamycin/lincomycin, Neosporin [Bacitracin-polymyxin B]    Isolation: None   Infection: None   Code Status: CPR    Ht: 185.4 cm (72.99\")   Wt: 101 kg (222 lb 10.6 oz)    Admission Cmt: None   Principal Problem: Aspiration pneumonia [J69.0]                   Active Insurance as of 3/12/2025       Primary Coverage       Payor Plan Insurance Group Employer/Plan Group    [employer] MEDICARE REPLACEMENT [employer] MEDICARE ADVANTAGE GROUP [id number]       Payor Plan Address Payor Plan Phone Number Payor Plan Fax Number Effective Dates    [address] [address]   2023 - None Entered    [facility] [address]         Subscriber Name Subscriber Birth Date Member ID       JOSÉ MIGUEL JERRY 1950 [id number]                     Emergency Contacts        (Rel.) Home Phone Work Phone Mobile Phone    Genna Jerry (Spouse) -- -- [phone]             [address]  [address] [facility] [address]  Phone:  [phone]  Fax:  [phone] Date: Mar 14, 2025      Ambulatory Referral to Home Health     Patient:  José Miguel Jerry MRN:  [id number]   [address]  [facility] [address] :  1950  SSN:    Phone: [phone] Sex:  M    "   INSURANCE PAYOR PLAN GROUP # SUBSCRIBER ID   Primary:    [facility] MEDICARE REPLACEMENT [id number]      Referring Provider Information:  PIPPA SIMPSON Phone: [phone] Fax: [phone]       Referral Information:   # Visits:  999 Referral Type: Home Health [42]   Urgency:  Routine Referral Reason: Specialty Services Required   Start Date: Mar 14, 2025 End Date:  To be determined by Insurer   Diagnosis: Acute respiratory failure with hypoxia (J96.01 [ICD-10-CM] 518.81 [ICD-9-CM])      Refer to Dept:   Refer to Provider:   Refer to Provider Phone:   Refer to Facility:       Face to Face Visit Date: 3/14/2025  Follow-up provider for Plan of Care? I treated the patient in an acute care facility and will not continue treatment after discharge.  Follow-up provider: VERNON SHEIKH [[id number]]  Reason/Clinical Findings: right knee replacement  Describe mobility limitations that make leaving home difficult: impaired mobility  Nursing/Therapeutic Services Requested: Physical Therapy  Frequency: 1 Week 1     This document serves as a request of services and does not constitute Insurance authorization or approval of services.  To determine eligibility, please contact the members Insurance carrier to verify and review coverage.     If you have medical questions regarding this request for services. Please contact [address] at [phone] during normal business hours.        Verbal Order Mode: Verbal with readback   Authorizing Provider: Pippa Simpson DO  Authorizing Provider's NPI: [id number]     Order Entered By: Carlene Sagastume RN 3/14/2025 10:44 AM     Electronically signed by:         Insurance Information                  [employer] MEDICARE REPLACEMENT/[facility] MEDICARE ADVANTAGE GROUP Phone: --    Subscriber: José Miguel Jerry Subscriber#: [id number]    Group#: [id number] Precert#: --    Authorization#: [id number] Effective Date: --             History &  Physical        Mechelle Pierce APRN at 25 2231       Attestation signed by Isidoro Hairston MD at 25 [phone]    I have reviewed this documentation and agree.                      [facility] Medicine Services  HISTORY AND PHYSICAL    Patient Name: José Miguel Jerry  : 1950  MRN: [id number]  Primary Care Physician: Hernandez Jackman MD  Date of admission: 3/12/2025    Subjective  Subjective     Chief Complaint:  Hypoxia     HPI:  José Miguel Jerry is a 74 y.o. male with a past medical history significant for essential hypertension, hyperlipidemia, CAD, obstructive sleep apnea, prostate cancer s/p biotic prostatectomy in  and hypothyroidism presents to the ED from outpatient surgery center due to hypoxia following a right total knee replacement.  Reportedly during extubation the patient was biting the tube and there was a possible aspiration event.  He was found to be hypoxic and transferred to [facility] ED for further evaluation.  Upon arrival patient does state he feels short of breath at times.  He is currently requiring 6 L of oxygen via nasal cannula with O2 saturation between 90 to 93%.  Initially he reports coughing up blood-tinged sputum.  He denies any nausea, abdominal pain, chest pain or dysuria.  CTA of chest obtained tonight does show groundglass changes in the upper lobes and to a lesser degree the right middle lobe in addition to basilar areas where there is more focal airspace disease.  This could relate to multifocal inflammatory infectious process.  Findings associated with aspiration not entirely excluded.        Review of Systems   Constitutional:  Negative for activity change, appetite change, chills, diaphoresis, fatigue, fever and unexpected weight change.   HENT: Negative.     Eyes:  Negative for photophobia and visual disturbance.   Respiratory:  Positive for cough and shortness of breath.    Cardiovascular:  Negative for chest  pain, palpitations and leg swelling.   Gastrointestinal: Negative.    Genitourinary: Negative.    Musculoskeletal: Negative.    Skin: Negative.    Neurological: Negative.    Psychiatric/Behavioral: Negative.                  Personal History     Past Medical History:   Diagnosis Date    Abnormal stress test 08/14/2018    1. MPS 8-8-18: Positive EKG changes Reversible Inferior ischemia EF 60%      Depression     Dyspnea on exertion     Elevated cholesterol     Erectile dysfunction     History of echocardiogram 08/2018    History of exercise stress test 08/2018    History of pneumonia 03/21/2019    Reports 40+ years ago    Hormone disorder 2013    Hypogonadism    Hypertension     Prostate cancer 2012    Seasonal allergies     reports mild    Sleep apnea     Uses CPAP HS - instructed to bring in DOS    Urinary incontinence June 2024    Wears glasses          Oncology Problem List:  Prostate cancer (06/22/2016; Status: Active)  Oncology/Hematology History    No history exists.       Past Surgical History:   Procedure Laterality Date    APPENDECTOMY      BLEPHAROPLASTY Bilateral     CARDIAC CATHETERIZATION N/A 08/20/2018    Procedure: Left Heart Cath;  Surgeon: Anastacia Gary MD;  Location:  [facility];  Service: Cardiology    CARDIOVASCULAR STRESS TEST  12/14/2021    CATARACT EXTRACTION, BILATERAL      CHOLECYSTECTOMY N/A 12/29/2021    Procedure: CHOLECYSTECTOMY LAPAROSCOPIC;  Surgeon: Caleb Edmond MD;  Location:  [facility] OR;  Service: General;  Laterality: N/A;    COLONOSCOPY      HEAD/NECK LESION/CYST EXCISION Right 10/31/2016    Procedure: EXCISE BCCA RIGHT SCALP;  Surgeon: Lowell Rider MD;  Location:  [facility];  Service:     NASAL SEPTUM SURGERY      PENILE PROSTHESIS IMPLANT N/A 11/28/2018    Procedure: PENILE PROSTHESIS PLACEMENT;  Surgeon: Nick Sommer MD;  Location:  [facility];  Service: Urology    PROSTATE SURGERY      SKIN BIOPSY      patient reports basal cell carcinoma removed  from head x3 places    TEETH EXTRACTION         Family History:  family history includes Heart attack in his brother, maternal grandfather, paternal grandfather, and sister; Heart disease in his daughter, father, maternal grandfather, mother, and paternal grandfather; Hypertension in his father, maternal grandfather, mother, and paternal grandfather; Kidney disease in his father; Liver disease in his father.     Social History:  reports that he has never smoked. He has never been exposed to tobacco smoke. He has never used smokeless tobacco. He reports current alcohol use of about 2.0 standard drinks of alcohol per week. He reports that he does not use drugs.  Social History     Social History Narrative    Not on file       Medications:  Azelastine HCl, ISOtretinoin, Lifitegrast, Vibegron, aspirin, benazepril, esomeprazole, hydroCHLOROthiazide, levothyroxine, metoprolol succinate XL, metroNIDAZOLE, montelukast, multivitamin with minerals, mupirocin, nortriptyline, simethicone, simvastatin, and venlafaxine    Allergies   Allergen Reactions    Chlorhexidine Rash    Clindamycin/Lincomycin Rash     itching    Neosporin [Bacitracin-Polymyxin B] Itching and Rash       Objective  Objective     Vital Signs:   Temp:  [97.9 °F (36.6 °C)] 97.9 °F (36.6 °C)  Heart Rate:  [80-82] 82  Resp:  [18] 18  BP: (139)/(76) 139/76  Flow (L/min) (Oxygen Therapy):  [6] 6    Physical Exam  Vitals and nursing note reviewed.   Constitutional:       General: He is not in acute distress.     Appearance: Normal appearance. He is not ill-appearing, toxic-appearing or diaphoretic.   HENT:      Head: Normocephalic.      Nose: Nose normal.      Mouth/Throat:      Mouth: Mucous membranes are dry.      Pharynx: Oropharynx is clear.   Eyes:      Extraocular Movements: Extraocular movements intact.      Conjunctiva/sclera: Conjunctivae normal.      Pupils: Pupils are equal, round, and reactive to light.   Cardiovascular:      Rate and Rhythm: Normal rate  and regular rhythm.      Pulses: Normal pulses.      Heart sounds: Normal heart sounds.   Pulmonary:      Effort: Pulmonary effort is normal.      Breath sounds: Rhonchi present.   Abdominal:      General: Bowel sounds are normal. There is no distension.      Palpations: Abdomen is soft. There is no mass.      Tenderness: There is no abdominal tenderness. There is no right CVA tenderness, left CVA tenderness, guarding or rebound.      Hernia: No hernia is present.   Musculoskeletal:         General: No swelling, tenderness, deformity or signs of injury.      Cervical back: Normal range of motion and neck supple.      Right lower leg: No edema.      Left lower leg: No edema.   Skin:     General: Skin is warm and dry.   Neurological:      General: No focal deficit present.      Mental Status: He is alert and oriented to person, place, and time. Mental status is at baseline.   Psychiatric:         Mood and Affect: Mood normal.         Behavior: Behavior normal.         Thought Content: Thought content normal.         Judgment: Judgment normal.            Result Review:  I have personally reviewed the results from the time of this admission to 3/12/2025 22:31 EDT and agree with these findings:  [x]  Laboratory list / accordion  [x]  Microbiology  [x]  Radiology  [x]  EKG/Telemetry   []  Cardiology/Vascular   []  Pathology  []  Old records  []  Other:  Most notable findings include:     LAB RESULTS:      Lab 03/12/25 1942   WBC 10.53   HEMOGLOBIN 18.1*   HEMATOCRIT 56.1*   PLATELETS 166   NEUTROS ABS 9.94*   IMMATURE GRANS (ABS) 0.05   LYMPHS ABS 0.29*   MONOS ABS 0.24   EOS ABS 0.00   MCV 90.5   CRP <0.30   PROCALCITONIN 0.03   LACTATE 4.6*   D DIMER QUANT 2.39*         Lab 03/12/25 1942   SODIUM 138   POTASSIUM 3.9   CHLORIDE 100   CO2 23.0   ANION GAP 15.0   BUN 18   CREATININE 1.13   EGFR 68.2   GLUCOSE 161*   CALCIUM 8.4*   MAGNESIUM 1.8         Lab 03/12/25 1942   TOTAL PROTEIN 6.0   ALBUMIN 3.7   GLOBULIN 2.3    ALT (SGPT) 30   AST (SGOT) 26   BILIRUBIN 0.5   ALK PHOS 84         Lab 03/12/25 2058 03/12/25  1942   PROBNP  --  37.7   HSTROP T 10 11                 Lab 03/12/25 2205   FIO2 21   CARBOXYHEMOGLOBIN (VENOUS) 1.3     Brief Urine Lab Results  (Last result in the past 365 days)        Color   Clarity   Blood   Leuk Est   Nitrite   Protein   CREAT   Urine HCG        03/12/25 2202 Yellow   Clear   Negative   Negative   Negative   Negative                 Microbiology Results (last 10 days)       Procedure Component Value - Date/Time    COVID PRE-OP / PRE-PROCEDURE SCREENING ORDER (NO ISOLATION) - Swab, Nasopharynx [[id number]]  (Normal) Collected: 03/12/25 2058    Lab Status: Final result Specimen: Swab from Nasopharynx Updated: 03/12/25 2206    Narrative:      The following orders were created for panel order COVID PRE-OP / PRE-PROCEDURE SCREENING ORDER (NO ISOLATION) - Swab, Nasopharynx.  Procedure                               Abnormality         Status                     ---------                               -----------         ------                     COVID-19, FLU A/B, RSV P...[[id number]]  Normal              Final result                 Please view results for these tests on the individual orders.    COVID-19, FLU A/B, RSV PCR 1 HR TAT - Swab, Nasopharynx [[id number]]  (Normal) Collected: 03/12/25 2058    Lab Status: Final result Specimen: Swab from Nasopharynx Updated: 03/12/25 2206     COVID19 Not Detected     Influenza A PCR Not Detected     Influenza B PCR Not Detected     RSV, PCR Not Detected    Narrative:      Fact sheet for providers: https://www.fda.gov/media/076209/download    Fact sheet for patients: https://www.fda.gov/media/951718/download    Test performed by PCR.            CT Angiogram Chest Pulmonary Embolism  Result Date: 3/12/2025  CT ANGIOGRAM CHEST PULMONARY EMBOLISM Date of Exam: 3/12/2025 8:37 PM EDT Indication: Acute hypoxia, tachycardia, surgery earlier today with possible  aspiration event, positive dimer. Comparison: Chest x-ray March 12, 2025 Technique: Axial CT images were obtained of the chest after the uneventful intravenous administration of 75 cc of Isovue-370 utilizing pulmonary embolism protocol.  In addition, a 3-D volume rendered image was created for interpretation.  Reconstructed coronal and sagittal images were also obtained. Automated exposure control and iterative construction methods were used. Findings: A pulmonary embolus not definitely identified. The thoracic aorta does not appear unusual. There is coronary artery calcification. On evaluation lung windows there are some groundglass changes within the upper lobes to a lesser degree the right middle lobe and basilar areas where there is more focal airspace disease. This could relate to multifocal inflammatory infectious process. Findings associated with aspiration not entirely excluded. There are no pleural effusions. Lower slices through the upper abdomen reveal no acute findings. There are some degenerative changes involving the dorsal spine.     Impression: Impression: 1.No definite evidence for a pulmonary embolus. 2.There are some groundglass changes within the upper lobes and to a lesser degree the right middle lobe in addition to basilar areas where there is more focal airspace disease. This could relate to multifocal inflammatory infectious process. Findings associated with aspiration not entirely excluded. 3.Coronary artery calcification. Electronically Signed: Heladio Justice MD  3/12/2025 8:53 PM EDT  Workstation ID: [id number]    XR Chest 1 View  Result Date: 3/12/2025  XR CHEST 1 VW Date of Exam: 3/12/2025 7:40 PM EDT Indication: SOA triage protocol Comparison: November 26, 2018 Findings: Heart not definitely enlarged. The lungs seem clear. There are no pleural effusions.     Impression: Impression: An acute pulmonary process is not apparent. Electronically Signed: Heladio Justice MD  3/12/2025 8:08 PM EDT   Workstation ID: [id number]          Assessment & Plan  Assessment & Plan       Aspiration pneumonia    Essential hypertension    Dyslipidemia    GERD (gastroesophageal reflux disease)    CAD (coronary artery disease)    Sleep apnea    History of prostate cancer      74 year old male presents to the ED from outpatient surgery center due to hypoxia.     1) aspiration pneumonia      Acute respiratory failure with hypoxia  - CTA chest mentioned above  - Blood cultures x 2 pending  - Continue vancomycin and Zosyn  - Currently requiring 6 L of oxygen via nasal cannula with O2 saturation between 90 and 93%  - Sputum culture  - Check urine antigens  - Check MRSA PCR  - Continuous pulse ox  - DuoNebs    2) S/p right total knee replacement  -ortho consult in am   -continue tramadol and percocet     3) obstructive sleep apnea  -BIPAP at night   -continuous pulse ox     4) Essential hypertension   -continue benazepril, metoprolol  -hold HCTZ for now      5) Hyperlipidemia  -continue statin     6) GERD  -PPI     7) Hypothyroidism   -continue synthroid       DVT prophylaxis:  scds    CODE STATUS:  Full Code        Expected Discharge  TBD     This note has been completed as part of a split-shared workflow.     Signature: Electronically signed by ERENDIRA Partida, 03/12/25, 10:40 PM EDT.                  Electronically signed by Isidoro Hairston MD at 03/13/25 0351       [unfilled]  Discharge Summary    No notes of this type exist for this encounter.

## 2025-03-14 NOTE — THERAPY EVALUATION
Patient Name: José Miguel Jerry  : 1950    MRN: [id number]                              Today's Date: 3/14/2025       Admit Date: 3/12/2025    Visit Dx:     ICD-10-CM ICD-9-CM   1. Acute respiratory failure with hypoxia  J96.01 518.81   2. Aspiration pneumonia of right middle lobe, unspecified aspiration pneumonia type  J69.0 507.0   3. Postoperative hypoxia  R09.02 799.02    Z98.890    4. Lactic acidemia  E87.20 276.2   5. Obstructive sleep apnea syndrome  G47.33 327.23     Patient Active Problem List   Diagnosis    Prostate cancer    Low testosterone    Essential hypertension    Dyslipidemia    GERD (gastroesophageal reflux disease)    Erectile dysfunction after radical prostatectomy    Dysesthesia    Neuropathic pain    Polycythemia    CAD (coronary artery disease)    Aspiration pneumonia    Sleep apnea    History of prostate cancer     Past Medical History:   Diagnosis Date    Abnormal stress test 2018    1. MPS 8-8-18: Positive EKG changes Reversible Inferior ischemia EF 60%      Depression     Dyspnea on exertion     Elevated cholesterol     Erectile dysfunction     History of echocardiogram 2018    History of exercise stress test 2018    History of pneumonia 2019    Reports 40+ years ago    Hormone disorder 2013    Hypogonadism    Hypertension     Prostate cancer 2012    Seasonal allergies     reports mild    Sleep apnea     Uses CPAP HS - instructed to bring in DOS    Urinary incontinence 2024    Wears glasses      Past Surgical History:   Procedure Laterality Date    APPENDECTOMY      BLEPHAROPLASTY Bilateral     CARDIAC CATHETERIZATION N/A 2018    Procedure: Left Heart Cath;  Surgeon: Anastacia Gary MD;  Location:  [facility];  Service: Cardiology    CARDIOVASCULAR STRESS TEST  2021    CATARACT EXTRACTION, BILATERAL      CHOLECYSTECTOMY N/A 2021    Procedure: CHOLECYSTECTOMY LAPAROSCOPIC;  Surgeon: Caleb Edmond MD;  Location:  [facility];   Service: General;  Laterality: N/A;    COLONOSCOPY      HEAD/NECK LESION/CYST EXCISION Right 10/31/2016    Procedure: EXCISE BCCA RIGHT SCALP;  Surgeon: Lowell Rider MD;  Location:  [facility];  Service:     NASAL SEPTUM SURGERY      PENILE PROSTHESIS IMPLANT N/A 11/28/2018    Procedure: PENILE PROSTHESIS PLACEMENT;  Surgeon: Nick Sommer MD;  Location:  [facility];  Service: Urology    PROSTATE SURGERY      SKIN BIOPSY      patient reports basal cell carcinoma removed from head x3 places    TEETH EXTRACTION        General Information       Row Name 03/14/25 1419          OT Time and Intention    Document Type evaluation  -MR     Mode of Treatment occupational therapy  -MR       Row Name 03/14/25 1419          General Information    Patient Profile Reviewed yes  -MR     Prior Level of Function independent:;all household mobility;gait;ADL's  typically ind no AD, will use walking sticks on a long walk, owns a FWW for discharge  -MR     Existing Precautions/Restrictions fall;other (see comments);oxygen therapy device and L/min  RLE WBAT, partial knee replacement at outpatient surgery center  -MR     Barriers to Rehab none identified  -MR       Row Name 03/14/25 1419          Living Environment    Current Living Arrangements home  -MR     People in Home spouse  -MR       Row Name 03/14/25 1419          Home Main Entrance    Number of Stairs, Main Entrance other (see comments)  ramp  -MR       Row Name 03/14/25 1419          Cognition    Orientation Status (Cognition) oriented x 3  -MR       Row Name 03/14/25 1419          Safety Issues/Impairments Affecting Functional Mobility    Safety Issues Affecting Function (Mobility) awareness of need for assistance;insight into deficits/self-awareness;safety precaution awareness;safety precautions follow-through/compliance  -MR     Impairments Affecting Function (Mobility) balance;pain;range of motion (ROM);strength  -MR               User Key  (r) = Recorded By, (t) =  Taken By, (c) = Cosigned By      Initials Name Provider Type     Radha Mcgee, OT Occupational Therapist                     Mobility/ADL's       Row Name 03/14/25 1420          Bed Mobility    Bed Mobility supine-sit;sit-supine  -MR     Supine-Sit [place] (Bed Mobility) standby assist  -MR     Sit-Supine [place] (Bed Mobility) standby assist  -MR     Assistive Device (Bed Mobility) bed rails;head of bed elevated  -MR       Row Name 03/14/25 1420          Transfers    Transfers sit-stand transfer  -MR       Row Name 03/14/25 1420          Sit-Stand Transfer    Sit-Stand [place] (Transfers) contact guard  -MR     Assistive Device (Sit-Stand Transfers) walker, front-wheeled  -MR       Row Name 03/14/25 1420          Functional Mobility    Functional Mobility- Ind. Level contact guard assist;verbal cues required;nonverbal cues required (demo/gesture)  -MR     Functional Mobility- Device walker, front-wheeled  -MR     Functional Mobility-Distance (Feet) --  HH distances w/in pt's room  -MR       Row Name 03/14/25 1420          Activities of Daily Living    BADL Assessment/Intervention lower body dressing  -MR       Row Name 03/14/25 1420          Mobility    Extremity Weight-bearing Status right lower extremity  -MR     Right Lower Extremity (Weight-bearing Status) weight-bearing as tolerated (WBAT)  -MR       Row Name 03/14/25 1420          Lower Body Dressing Assessment/Training    [place] Level (Lower Body Dressing) don;socks;maximum assist (25% patient effort)  -MR     Position (Lower Body Dressing) edge of bed sitting  -MR     Comment, (Lower Body Dressing) Pt educated on sequencing for LB dressing to maximize independence while limiting pain in R knee.  -MR               User Key  (r) = Recorded By, (t) = Taken By, (c) = Cosigned By      Initials Name Provider Type    MR Mcgee Radha, OT Occupational Therapist                   Obj/Interventions       Row Name 03/14/25 1421           Sensory Assessment (Somatosensory)    Sensory Assessment (Somatosensory) UE sensation intact  -MR       Row Name 03/14/25 1421          Vision Assessment/Intervention    Visual Impairment/Limitations WFL  -MR       Row Name 03/14/25 1421          Range of Motion Comprehensive    General Range of Motion bilateral upper extremity ROM WFL  -MR       Row Name 03/14/25 1421          Strength Comprehensive (MMT)    Comment, General Manual Muscle Testing (MMT) Assessment BUE grossly 4+/5 in all functional planes.  -MR       Row Name 03/14/25 1421          Balance    Balance Assessment sitting static balance;sitting dynamic balance;standing static balance;standing dynamic balance  -MR     Static Sitting Balance independent  -MR     Dynamic Sitting Balance supervision  -MR     Position, Sitting Balance unsupported;sitting edge of bed  -MR     Static Standing Balance standby assist  -MR     Dynamic Standing Balance standby assist  -MR     Position/Device Used, Standing Balance supported;walker, front-wheeled  -MR     Balance Interventions sitting;standing;sit to stand;supported;static;dynamic;minimal challenge;occupation based/functional task  -MR               User Key  (r) = Recorded By, (t) = Taken By, (c) = Cosigned By      Initials Name Provider Type    MR Radha Mcgee, OT Occupational Therapist                   Goals/Plan       Row Name 03/14/25 1439          Transfer Goal 1 (OT)    Activity/Assistive Device (Transfer Goal 1, OT) sit-to-stand/stand-to-sit;toilet  -MR     Price Level/Cues Needed (Transfer Goal 1, OT) contact guard required  -MR     Time Frame (Transfer Goal 1, OT) short term goal (STG);3 days  -MR     Progress/Outcome (Transfer Goal 1, OT) new goal  -MR       Row Name 03/14/25 1439          Dressing Goal 1 (OT)    Activity/Device (Dressing Goal 1, OT) lower body dressing  -MR     Price/Cues Needed (Dressing Goal 1, OT) moderate assist (50-74% patient effort)  -MR     Time Frame (Dressing  Goal 1, OT) long term goal (LTG);5 days  -MR     Progress/Outcome (Dressing Goal 1, OT) new goal  -MR       Row Name 03/14/25 1439          Grooming Goal 1 (OT)    Activity/Device (Grooming Goal 1, OT) grooming skills, all;other (see comments)  standing at sink side  -MR     [place] (Grooming Goal 1, OT) contact guard required  -MR     Time Frame (Grooming Goal 1, OT) long term goal (LTG);5 days  -MR     Progress/Outcome (Grooming Goal 1, OT) new goal  -MR       Row Name 03/14/25 1439          Therapy Assessment/Plan (OT)    Planned Therapy Interventions (OT) activity tolerance training;adaptive equipment training;BADL retraining;functional balance retraining;IADL retraining;transfer/mobility retraining;strengthening exercise;patient/caregiver education/training;occupation/activity based interventions;passive ROM/stretching;ROM/therapeutic exercise  -MR               User Key  (r) = Recorded By, (t) = Taken By, (c) = Cosigned By      Initials Name Provider Type    MR Radha Mcgee, OT Occupational Therapist                   Clinical Impression       Row Name 03/14/25 1422          Pain Assessment    Pretreatment Pain Rating 5/10  -MR     Posttreatment Pain Rating 8/10  -MR     Pain Location knee  -MR     Pain Side/Orientation right;generalized  -MR       Row Name 03/14/25 1422          Plan of Care Review    Plan of Care Reviewed With patient;spouse  -MR     Progress no change  Initial Eval  -MR     Outcome Evaluation Patient presenting below his functional baseline w/ mobility, transfers and balance. Pt requiring increased risk for ADLs. Pt limited by R knee pain and O2 demands. Deficits warrant skilled OT services. Recommend home w/ assist when medically appropriate for d/c.  -MR       Row Name 03/14/25 1421          Therapy Assessment/Plan (OT)    Patient/Family Therapy Goal Statement (OT) Return to PLOF  -MR     Rehab Potential (OT) good  -MR     Criteria for Skilled Therapeutic Interventions Met (OT)  yes;meets criteria;skilled treatment is necessary  -MR     Therapy Frequency (OT) daily  -MR     Predicted Duration of Therapy Intervention (OT) 5 days  -MR       Row Name 03/14/25 1422          Therapy Plan Review/Discharge Plan (OT)    Anticipated Discharge Disposition (OT) home with assist  -MR       Row Name 03/14/25 1422          Vital Signs    Pre Systolic BP Rehab 155  -MR     Pre Treatment Diastolic BP 76  -MR     Pretreatment Heart Rate (beats/min) 83  -MR     Pre SpO2 (%) 91  -MR     O2 Delivery Pre Treatment nasal cannula  -MR     O2 Delivery Intra Treatment nasal cannula  -MR     O2 Delivery Post Treatment nasal cannula  -MR     Pre Patient Position Sitting  -MR     Post Patient Position Sitting  -MR       Row Name 03/14/25 1422          Positioning and Restraints    Pre-Treatment Position sitting in chair/recliner  -MR     Post Treatment Position chair  -MR     In Chair reclined;call light within reach;encouraged to call for assist;with family/caregiver;waffle cushion;R heel elevated;notified nsg  -MR               User Key  (r) = Recorded By, (t) = Taken By, (c) = Cosigned By      Initials Name Provider Type    MR Radha Mcgee, OT Occupational Therapist                   Outcome Measures       Row Name 03/14/25 1442          How much help from another is currently needed...    Putting on and taking off regular lower body clothing? 2  -MR     Bathing (including washing, rinsing, and drying) 2  -MR     Toileting (which includes using toilet bed pan or urinal) 3  -MR     Putting on and taking off regular upper body clothing 3  -MR     Taking care of personal grooming (such as brushing teeth) 4  -MR     Eating meals 4  -MR     AM-PAC 6 Clicks Score (OT) 18  -MR       Row Name 03/14/25 1028          How much help from another person do you currently need...    Turning from your back to your side while in flat bed without using bedrails? 4  -CK     Moving from lying on back to sitting on the side of a flat  bed without bedrails? 4  -CK     Moving to and from a bed to a chair (including a wheelchair)? 4  -CK     Standing up from a chair using your arms (e.g., wheelchair, bedside chair)? 4  -CK     Climbing 3-5 steps with a railing? 3  -CK     To walk in hospital room? 3  -CK     AM-PAC 6 Clicks Score (PT) 22  -CK     Highest Level of Mobility Goal 7 --> Walk 25 feet or more  -CK       Row Name 03/14/25 1442 03/14/25 1028       Functional Assessment    Outcome Measure Options AM-PAC 6 Clicks Daily Activity (OT)  -MR AM-PAC 6 Clicks Basic Mobility (PT)  -CK              User Key  (r) = Recorded By, (t) = Taken By, (c) = Cosigned By      Initials Name Provider Type    MR Radha Mcgee, OT Occupational Therapist    CK Julissa Preciado, PT Physical Therapist                    Occupational Therapy Education       Title: PT OT SLP Therapies (In Progress)       Topic: Occupational Therapy (In Progress)       Point: ADL training (Done)       Learning Progress Summary            Patient Acceptance, E, VU by MR at 3/14/2025 1444                      Point: Precautions (Done)       Learning Progress Summary            Patient Acceptance, E, VU by MR at 3/14/2025 1444                      Point: Body mechanics (Done)       Learning Progress Summary            Patient Acceptance, E, VU by MR at 3/14/2025 1444                                      User Key       Initials Effective Dates Name Provider Type Discipline    MR 09/22/22 -  Radha Mcgee, OT Occupational Therapist OT                  OT Recommendation and Plan  Planned Therapy Interventions (OT): activity tolerance training, adaptive equipment training, BADL retraining, functional balance retraining, IADL retraining, transfer/mobility retraining, strengthening exercise, patient/caregiver education/training, occupation/activity based interventions, passive ROM/stretching, ROM/therapeutic exercise  Therapy Frequency (OT): daily  Plan of Care Review  Plan of Care Reviewed  With: patient, spouse  Progress: no change (Initial Eval)  Outcome Evaluation: Patient presenting below his functional baseline w/ mobility, transfers and balance. Pt requiring increased risk for ADLs. Pt limited by R knee pain and O2 demands. Deficits warrant skilled OT services. Recommend home w/ assist when medically appropriate for d/c.     Time Calculation:   Evaluation Complexity (OT)  Review Occupational Profile/Medical/Therapy History Complexity: brief/low complexity  Assessment, Occupational Performance/Identification of Deficit Complexity: 1-3 performance deficits  Clinical Decision Making Complexity (OT): problem focused assessment/low complexity  Overall Complexity of Evaluation (OT): low complexity     Time Calculation- OT       Row Name 03/14/25 1445 03/14/25 1029          Time Calculation- OT    OT Start Time 1055  -MR --     OT Received On 03/14/25  -MR --     OT Goal Re-Cert Due Date 03/24/25  -MR --        Timed Charges    02203 - Gait Training Minutes  -- 10  -CK        Untimed Charges    OT Eval/Re-eval Minutes 46  -MR --        Total Minutes    Timed Charges Total Minutes -- 10  -CK     Untimed Charges Total Minutes 46  -MR --      Total Minutes 46  -MR 10  -CK               User Key  (r) = Recorded By, (t) = Taken By, (c) = Cosigned By      Initials Name Provider Type    MR Radha Mcgee OT Occupational Therapist    CK Julissa Preciado PT Physical Therapist                  Therapy Charges for Today       Code Description Service Date Service Provider Modifiers Qty    [id number] HC OT EVAL LOW COMPLEXITY 4 3/14/2025 Radha Mcgee OT GO 1                 Radha Mcgee OT  3/14/2025

## 2025-03-14 NOTE — CASE MANAGEMENT/SOCIAL WORK
Continued Stay Note  [facility]     Patient Name: José Miguel Jerry  MRN: [id number]  Today's Date: 3/14/2025    Admit Date: 3/12/2025    Plan: Li   Discharge Plan       Row Name 03/14/25 1048       Plan    Plan Li    Plan Comments [facility] has been setup for PT and [employer] will supply pts home O2. They will bring a portable tank prior to dc.                   Discharge Codes    No documentation.                 Expected Discharge Date and Time       Expected Discharge Date Expected Discharge Time    Mar 14, 2025               Carlene Sagastume RN

## 2025-03-14 NOTE — PLAN OF CARE
Goal Outcome Evaluation:  Plan of Care Reviewed With: patient        Progress: no change  Outcome Evaluation: Patient gave good effort with mobility and therex this AM but was significantly limited by pain, RN notified. He ambulated on 4L O2 and had no desaturation with mobility. Able to review his postop HEP and will continue with this in PM. Continue to recommend D/C home with HHPT when medically appropriate.    Anticipated Discharge Disposition (PT): home with assist, home with home health

## 2025-03-14 NOTE — THERAPY TREATMENT NOTE
Patient Name: José Miguel Jerry  : 1950    MRN: [id number]                              Today's Date: 3/14/2025       Admit Date: 3/12/2025    Visit Dx:     ICD-10-CM ICD-9-CM   1. Acute respiratory failure with hypoxia  J96.01 518.81   2. Aspiration pneumonia of right middle lobe, unspecified aspiration pneumonia type  J69.0 507.0   3. Postoperative hypoxia  R09.02 799.02    Z98.890    4. Lactic acidemia  E87.20 276.2     Patient Active Problem List   Diagnosis    Prostate cancer    Low testosterone    Essential hypertension    Dyslipidemia    GERD (gastroesophageal reflux disease)    Erectile dysfunction after radical prostatectomy    Dysesthesia    Neuropathic pain    Polycythemia    CAD (coronary artery disease)    Aspiration pneumonia    Sleep apnea    History of prostate cancer     Past Medical History:   Diagnosis Date    Abnormal stress test 2018    1. MPS 8-8-18: Positive EKG changes Reversible Inferior ischemia EF 60%      Depression     Dyspnea on exertion     Elevated cholesterol     Erectile dysfunction     History of echocardiogram 2018    History of exercise stress test 2018    History of pneumonia 2019    Reports 40+ years ago    Hormone disorder 2013    Hypogonadism    Hypertension     Prostate cancer 2012    Seasonal allergies     reports mild    Sleep apnea     Uses CPAP HS - instructed to bring in DOS    Urinary incontinence 2024    Wears glasses      Past Surgical History:   Procedure Laterality Date    APPENDECTOMY      BLEPHAROPLASTY Bilateral     CARDIAC CATHETERIZATION N/A 2018    Procedure: Left Heart Cath;  Surgeon: Anastacia Gary MD;  Location:  [facility];  Service: Cardiology    CARDIOVASCULAR STRESS TEST  2021    CATARACT EXTRACTION, BILATERAL      CHOLECYSTECTOMY N/A 2021    Procedure: CHOLECYSTECTOMY LAPAROSCOPIC;  Surgeon: Caleb Edmond MD;  Location:  [facility];  Service: General;  Laterality: N/A;    COLONOSCOPY       HEAD/NECK LESION/CYST EXCISION Right 10/31/2016    Procedure: EXCISE BCCA RIGHT SCALP;  Surgeon: Lowell Rider MD;  Location:  [facility];  Service:     NASAL SEPTUM SURGERY      PENILE PROSTHESIS IMPLANT N/A 11/28/2018    Procedure: PENILE PROSTHESIS PLACEMENT;  Surgeon: Nick Sommer MD;  Location:  [facility];  Service: Urology    PROSTATE SURGERY      SKIN BIOPSY      patient reports basal cell carcinoma removed from head x3 places    TEETH EXTRACTION        General Information       Row Name 03/14/25 0930          Physical Therapy Time and Intention    Document Type therapy note (daily note)  -CK     Mode of Treatment physical therapy;individual therapy  -CK       Row Name 03/14/25 0930          General Information    Patient Profile Reviewed yes  -CK     Existing Precautions/Restrictions fall;other (see comments);oxygen therapy device and L/min  RLE WBAT, partial knee replacement at outpatient surgery center  -CK     Barriers to Rehab none identified  -CK       Row Name 03/14/25 0930          Cognition    Orientation Status (Cognition) oriented x 3  -CK       Row Name 03/14/25 0930          Safety Issues/Impairments Affecting Functional Mobility    Safety Issues Affecting Function (Mobility) awareness of need for assistance;insight into deficits/self-awareness;safety precaution awareness;safety precautions follow-through/compliance  -CK     Impairments Affecting Function (Mobility) balance;pain;range of motion (ROM);strength  -CK               User Key  (r) = Recorded By, (t) = Taken By, (c) = Cosigned By      Initials Name Provider Type    CK Julissa Preciado, RIA Physical Therapist                   Mobility       Row Name 03/14/25 0934          Bed Mobility    Comment, (Bed Mobility) sitting EOB eating breakfast upon therapist entry  -CK       Row Name 03/14/25 0934          Sit-Stand Transfer    Sit-Stand [place] (Transfers) contact guard  -       Row Name 03/14/25 0934           Gait/Stairs (Locomotion)    [place] Level (Gait) standby assist  -CK     Assistive Device (Gait) walker, front-wheeled  -CK     Patient was able to Ambulate yes  -CK     Distance in Feet (Gait) 50  -CK     Deviations/Abnormal Patterns (Gait) bilateral deviations;brittanie decreased;gait speed decreased;stride length decreased  -CK     Right Sided Gait Deviations weight shift ability decreased  -CK     Comment, (Gait/Stairs) Patient able to ambulate brief distance in [place] on 4L O2 with step through gait pattern. He was limited by significant pain today with decreased weight acceptance onto RLE. He was SOA while ambulating, but SpO2 was 94% on 4L O2. Distance limited by significant pain.  -CK       Row Name 03/14/25 0934          Mobility    Extremity Weight-bearing Status right lower extremity  -CK     Right Lower Extremity (Weight-bearing Status) weight-bearing as tolerated (WBAT)  -CK               User Key  (r) = Recorded By, (t) = Taken By, (c) = Cosigned By      Initials Name Provider Type    CK Julissa Preciado, PT Physical Therapist                   Obj/Interventions       Row Name 03/14/25 0942          Motor Skills    Therapeutic Exercise hip;knee;other (see comments)  utilized patient's postop packet as able, will attempt prone/sidelying exercises this afternoon  -CK       Row Name 03/14/25 0942          Hip (Therapeutic Exercise)    Hip (Therapeutic Exercise) strengthening exercise  -CK     Hip Strengthening (Therapeutic Exercise) right;heel slides;10 repetitions;other (see comments)  gait belt assist  -CK       Row Name 03/14/25 0942          Knee (Therapeutic Exercise)    Knee (Therapeutic Exercise) isometric exercises;strengthening exercise  -CK     Knee Isometrics (Therapeutic Exercise) right;quad sets;10 repetitions;3 second hold  -CK     Knee Strengthening (Therapeutic Exercise) right;SAQ (short arc quad);10 repetitions  -CK       Row Name 03/14/25 0942          Balance    Balance Assessment  sitting static balance;standing static balance;standing dynamic balance  -CK     Static Sitting Balance independent  -CK     Position, Sitting Balance unsupported;sitting edge of bed  -CK     Static Standing Balance standby assist  -CK     Dynamic Standing Balance standby assist  -CK     Position/Device Used, Standing Balance supported;walker, front-wheeled  -CK               User Key  (r) = Recorded By, (t) = Taken By, (c) = Cosigned By      Initials Name Provider Type    CK Julissa Preciado, RIA Physical Therapist                   Goals/Plan    No documentation.                  Clinical Impression       Row Name 03/14/25 0943          Pain    Pretreatment Pain Rating 6/10  -CK     Posttreatment Pain Rating 8/10  -CK     Pain Location knee  -CK     Pain Side/Orientation right;generalized  -CK     Pain Management Interventions exercise or physical activity utilized;positioning techniques utilized;nursing notified;cold applied  -CK     Response to Pain Interventions activity participation with increased pain  -CK       Row Name 03/14/25 0943          Plan of Care Review    Plan of Care Reviewed With patient  -CK     Progress no change  -CK     Outcome Evaluation Patient gave good effort with mobility and therex this AM but was significantly limited by pain, RN notified. He ambulated on 4L O2 and had no desaturation with mobility. Able to review his postop HEP and will continue with this in PM. Continue to recommend D/C home with HHPT when medically appropriate.  -CK       Row Name 03/14/25 0943          Vital Signs    Pre Systolic BP Rehab 152  -CK     Pre Treatment Diastolic BP 79  -CK     Pretreatment Heart Rate (beats/min) 94  -CK     Posttreatment Heart Rate (beats/min) 92  -CK     Pre SpO2 (%) 94  -CK     O2 Delivery Pre Treatment nasal cannula  -CK     Intra SpO2 (%) 94  -CK     O2 Delivery Intra Treatment nasal cannula  -CK     Post SpO2 (%) 95  -CK     O2 Delivery Post Treatment nasal cannula  -CK     Pre  Patient Position Sitting  -CK     Post Patient Position Sitting  -CK       Row Name 03/14/25 0943          Positioning and Restraints    Pre-Treatment Position sitting in chair/recliner  -CK     Post Treatment Position chair  -CK     In Chair reclined;call light within reach;encouraged to call for assist;exit alarm on;with family/caregiver;waffle cushion;R heel elevated;notified nsg  -CK               User Key  (r) = Recorded By, (t) = Taken By, (c) = Cosigned By      Initials Name Provider Type    Julissa Mena PT Physical Therapist                   Outcome Measures       Row Name 03/14/25 1028          How much help from another person do you currently need...    Turning from your back to your side while in flat bed without using bedrails? 4  -CK     Moving from lying on back to sitting on the side of a flat bed without bedrails? 4  -CK     Moving to and from a bed to a chair (including a wheelchair)? 4  -CK     Standing up from a chair using your arms (e.g., wheelchair, bedside chair)? 4  -CK     Climbing 3-5 steps with a railing? 3  -CK     To walk in hospital room? 3  -CK     AM-PAC 6 Clicks Score (PT) 22  -CK     Highest Level of Mobility Goal 7 --> Walk 25 feet or more  -CK       Row Name 03/14/25 1028          Functional Assessment    Outcome Measure Options AM-PAC 6 Clicks Basic Mobility (PT)  -CK               User Key  (r) = Recorded By, (t) = Taken By, (c) = Cosigned By      Initials Name Provider Type    Julissa Mena PT Physical Therapist                                 Physical Therapy Education       Title: PT OT SLP Therapies (Done)       Topic: Physical Therapy (Done)       Point: Mobility training (Done)       Learning Progress Summary            Patient Acceptance, E, VU by CK at 3/14/2025 1029    Acceptance, E, VU by CK at 3/13/2025 1627   Significant Other Acceptance, E, VU by CK at 3/14/2025 1029                      Point: Home exercise program (Done)       Learning  Progress Summary            Patient Acceptance, E, VU by CK at 3/14/2025 1029    Acceptance, E, VU by CK at 3/13/2025 1627   Significant Other Acceptance, E, VU by CK at 3/14/2025 1029                      Point: Body mechanics (Done)       Learning Progress Summary            Patient Acceptance, E, VU by CK at 3/14/2025 1029    Acceptance, E, VU by CK at 3/13/2025 1627   Significant Other Acceptance, E, VU by CK at 3/14/2025 1029                      Point: Precautions (Done)       Learning Progress Summary            Patient Acceptance, E, VU by CK at 3/14/2025 1029    Acceptance, E, VU by CK at 3/13/2025 1627   Significant Other Acceptance, E, VU by CK at 3/14/2025 1029                                      User Key       Initials Effective Dates Name Provider Type Discipline     02/06/24 -  Julissa Preciado, PT Physical Therapist PT                  PT Recommendation and Plan  Planned Therapy Interventions (PT): balance training, bed mobility training, gait training, home exercise program, neuromuscular re-education, patient/family education, postural re-education, ROM (range of motion), stair training, strengthening, stretching, transfer training  Progress: no change  Outcome Evaluation: Patient gave good effort with mobility and therex this AM but was significantly limited by pain, RN notified. He ambulated on 4L O2 and had no desaturation with mobility. Able to review his postop HEP and will continue with this in PM. Continue to recommend D/C home with HHPT when medically appropriate.     Time Calculation:   PT Evaluation Complexity  History, PT Evaluation Complexity: 3 or more personal factors and/or comorbidities  Examination of Body Systems (PT Eval Complexity): total of 3 or more elements  Clinical Presentation (PT Evaluation Complexity): stable  Clinical Decision Making (PT Evaluation Complexity): low complexity  Overall Complexity (PT Evaluation Complexity): low complexity     PT Charges       Row  Name 03/14/25 1029             Time Calculation    Start Time 0827  -CK      PT Received On 03/14/25  -CK         Timed Charges    87784 - PT Therapeutic Exercise Minutes 23  -CK      02621 - Gait Training Minutes  10  -CK      02688 - PT Therapeutic Activity Minutes 10  -CK         Total Minutes    Timed Charges Total Minutes 43  -CK       Total Minutes 43  -CK                User Key  (r) = Recorded By, (t) = Taken By, (c) = Cosigned By      Initials Name Provider Type    CK Julissa Preciado, PT Physical Therapist                  Therapy Charges for Today       Code Description Service Date Service Provider Modifiers Qty    16624169184 HC PT THER PROC EA 15 MIN 3/13/2025 Julissa Preciado, PT GP 1    48718032258 HC PT EVAL LOW COMPLEXITY 4 3/13/2025 Julissa Preciado, PT GP 1    86308462365 HC PT THER PROC EA 15 MIN 3/14/2025 Julissa Preciado, PT GP 1    19891796100 HC GAIT TRAINING EA 15 MIN 3/14/2025 Julissa Preciado, PT GP 1    88250257596 HC PT THERAPEUTIC ACT EA 15 MIN 3/14/2025 Julissa Preciado, PT GP 1            PT G-Codes  Outcome Measure Options: AM-PAC 6 Clicks Basic Mobility (PT)  AM-PAC 6 Clicks Score (PT): 22  PT Discharge Summary  Anticipated Discharge Disposition (PT): home with assist, home with home health    Julissa Preciado, PT  3/14/2025

## 2025-03-14 NOTE — DISCHARGE SUMMARY
[facility]  DISCHARGE SUMMARY    Patient Name: José Miguel Jerry  : 1950  MRN: [id number]    Date of Admission: 3/12/2025  7:30 PM  Date of Discharge:  3/14/2025  Primary Care Physician: Hernandez Jackman MD    Consults       Date and Time Order Name Status Description    3/13/2025 12:55 AM Inpatient Orthopedic Surgery Consult              Hospital Course       Active Hospital Problems    Diagnosis  POA    **Aspiration pneumonia [J69.0]  Yes    Sleep apnea [G47.30]  Yes    History of prostate cancer [Z85.46]  Not Applicable    CAD (coronary artery disease) [I25.10]  Yes    Dyslipidemia [E78.5]  Yes    Essential hypertension [I10]  Yes    GERD (gastroesophageal reflux disease) [K21.9]  Yes      Resolved Hospital Problems   No resolved problems to display.          Hospital Course:  José Miguel Jerry is a 74 y.o. male  with a past medical history significant for essential hypertension, hyperlipidemia, CAD, obstructive sleep apnea, prostate cancer s/p biotic prostatectomy in  and hypothyroidism presents to the ED from outpatient surgery center due to hypoxia following a right total knee replacement.  Reportedly during extubation the patient was biting the tube and there was a possible aspiration event.  He was found to be hypoxic and transferred to [facility] ED for further evaluation.      Aspiration pneumonia  Hypoxia   -CTA chest negative for PE  -On 2L NC, wean as tolerated to RA. CM arranging O2 for discharge.  -Transition Zosyn to Augmentin for 5 more days  -DC'd Vanc, MRSA PCR negative   -Strep pneumo, legionella negative   -Blood Cx NGTD     Lactic acidosis, clinically significant  -resolved     S/p right total knee replacement  -surgery per Dr Bond   -continue tramadol and percocet, already has pain med Rx at home  -PT/OT evaluated, home with       Obstructive sleep apnea  -BIPAP at night      Essential hypertension    -continue benazepril, metoprolol, hctz      Hyperlipidemia  -continue statin      GERD  -PPI      Hypothyroidism   -continue synthroid      Pre-DM  -Hga1c 5.7    Discharge Follow Up Recommendations for outpatient labs/diagnostics:  -PCP 1 week  -Dr Bond as scheduled    Day of Discharge     HPI:   Patient seen and examined. Had pain in his leg last night. Coughed up a small clot of blood on tissue paper, left in bathroom for me to look at. Discussed likely from irritation from ET tube/Aspiration PNA. States he discussed this with his Orthopedist as well who agreed.     Review of Systems  Gen- No fevers, chills  CV- No chest pain, palpitations  Resp- +cough  GI- No N/V/D, abd pain    Vital Signs:   Temp:  [98 °F (36.7 °C)-98.4 °F (36.9 °C)] 98.4 °F (36.9 °C)  Heart Rate:  [66-81] 77  Resp:  [18] 18  BP: (120-152)/(57-87) 152/79  Flow (L/min) (Oxygen Therapy):  [3-4] 3      Physical Exam:  Constitutional: No acute distress, awake, alert  HENT: NCAT, mucous membranes moist  Respiratory: Clear to auscultation bilaterally, respiratory effort normal turned down to 2L NC on my exam   Cardiovascular: RRR, no murmurs, rubs, or gallops  Gastrointestinal: Positive bowel sounds, soft, nontender, nondistended  Musculoskeletal: RLE wrapped in ACE   Psychiatric: Appropriate affect, cooperative  Neurologic: Oriented x 3, VILLARREAL, speech clear  Skin: No rashes     Pertinent  and/or Most Recent Results     LAB RESULTS:      Lab 03/14/25  0459 03/13/25  0756 03/13/25  0159 03/12/25  2244 03/12/25  1942   WBC 10.29  --  10.57  --  10.53   HEMOGLOBIN 15.9  --  17.1  --  18.1*   HEMATOCRIT 50.4  --  54.1*  --  56.1*   PLATELETS 134*  --  163  --  166   NEUTROS ABS  --   --  9.75*  --  9.94*   IMMATURE GRANS (ABS)  --   --  0.04  --  0.05   LYMPHS ABS  --   --  0.46*  --  0.29*   MONOS ABS  --   --  0.31  --  0.24   EOS ABS  --   --  0.00  --  0.00   MCV 91.5  --  91.1  --  90.5   CRP  --   --   --   --  <0.30   PROCALCITONIN  --    --   --   --  0.03   LACTATE  --  1.6 3.2* 3.9* 4.6*   D DIMER QUANT  --   --   --   --  2.39*         Lab 03/14/25  0459 03/13/25 0159 03/12/25 1942   SODIUM 135* 138 138   POTASSIUM 4.1 4.6 3.9   CHLORIDE 102 102 100   CO2 23.0 23.0 23.0   ANION GAP 10.0 13.0 15.0   BUN 15 16 18   CREATININE 0.75* 1.06 1.13   EGFR 94.7 73.6 68.2   GLUCOSE 85 139* 161*   CALCIUM 8.2* 8.2* 8.4*   MAGNESIUM  --   --  1.8   HEMOGLOBIN A1C  --  5.70*  --          Lab 03/12/25 1942   TOTAL PROTEIN 6.0   ALBUMIN 3.7   GLOBULIN 2.3   ALT (SGPT) 30   AST (SGOT) 26   BILIRUBIN 0.5   ALK PHOS 84         Lab 03/12/25 2058 03/12/25 1942   PROBNP  --  37.7   HSTROP T 10 11                 Lab 03/12/25  2205   FIO2 21   CARBOXYHEMOGLOBIN (VENOUS) 1.3     Brief Urine Lab Results  (Last result in the past 365 days)        Color   Clarity   Blood   Leuk Est   Nitrite   Protein   CREAT   Urine HCG        03/12/25 2202 Yellow   Clear   Negative   Negative   Negative   Negative                 Microbiology Results (last 10 days)       Procedure Component Value - Date/Time    Legionella Antigen, Urine - Urine, Urine, Clean Catch [[id number]]  (Normal) Collected: 03/13/25 0306    Lab Status: Final result Specimen: Urine, Clean Catch Updated: 03/13/25 1042     LEGIONELLA ANTIGEN, URINE Negative    S. Pneumo Ag Urine or CSF - Urine, Urine, Clean Catch [[id number]]  (Normal) Collected: 03/13/25 0306    Lab Status: Final result Specimen: Urine, Clean Catch Updated: 03/13/25 1042     Strep Pneumo Ag Negative    MRSA Screen, PCR (Inpatient) - Swab, Nares [[id number]]  (Normal) Collected: 03/12/25 2322    Lab Status: Final result Specimen: Swab from Nares Updated: 03/13/25 0908     MRSA PCR Negative    Narrative:      The negative predictive value of this diagnostic test is high and should only be used to consider de-escalating anti-MRSA therapy. A positive result may indicate colonization with MRSA and must be correlated clinically.  MRSA Negative    COVID  PRE-OP / PRE-PROCEDURE SCREENING ORDER (NO ISOLATION) - Swab, Nasopharynx [[id number]]  (Normal) Collected: 03/12/25 2058    Lab Status: Final result Specimen: Swab from Nasopharynx Updated: 03/12/25 2206    Narrative:      The following orders were created for panel order COVID PRE-OP / PRE-PROCEDURE SCREENING ORDER (NO ISOLATION) - Swab, Nasopharynx.  Procedure                               Abnormality         Status                     ---------                               -----------         ------                     COVID-19, FLU A/B, RSV P...[[id number]]  Normal              Final result                 Please view results for these tests on the individual orders.    COVID-19, FLU A/B, RSV PCR 1 HR TAT - Swab, Nasopharynx [[id number]]  (Normal) Collected: 03/12/25 2058    Lab Status: Final result Specimen: Swab from Nasopharynx Updated: 03/12/25 2206     COVID19 Not Detected     Influenza A PCR Not Detected     Influenza B PCR Not Detected     RSV, PCR Not Detected    Narrative:      Fact sheet for providers: https://www.fda.gov/media/899050/download    Fact sheet for patients: https://www.fda.gov/media/090930/download    Test performed by PCR.    Blood Culture - Blood, Arm, Right [[id number]]  (Normal) Collected: 03/12/25 2031    Lab Status: Preliminary result Specimen: Blood from Arm, Right Updated: 03/13/25 2045     Blood Culture No growth at 24 hours    Blood Culture - Blood, Arm, Left [[id number]]  (Normal) Collected: 03/12/25 2023    Lab Status: Preliminary result Specimen: Blood from Arm, Left Updated: 03/13/25 2045     Blood Culture No growth at 24 hours            CT Angiogram Chest Pulmonary Embolism  Result Date: 3/12/2025  CT ANGIOGRAM CHEST PULMONARY EMBOLISM Date of Exam: 3/12/2025 8:37 PM EDT Indication: Acute hypoxia, tachycardia, surgery earlier today with possible aspiration event, positive dimer. Comparison: Chest x-ray March 12, 2025 Technique: Axial CT images were obtained of the chest after  the uneventful intravenous administration of 75 cc of Isovue-370 utilizing pulmonary embolism protocol.  In addition, a 3-D volume rendered image was created for interpretation.  Reconstructed coronal and sagittal images were also obtained. Automated exposure control and iterative construction methods were used. Findings: A pulmonary embolus not definitely identified. The thoracic aorta does not appear unusual. There is coronary artery calcification. On evaluation lung windows there are some groundglass changes within the upper lobes to a lesser degree the right middle lobe and basilar areas where there is more focal airspace disease. This could relate to multifocal inflammatory infectious process. Findings associated with aspiration not entirely excluded. There are no pleural effusions. Lower slices through the upper abdomen reveal no acute findings. There are some degenerative changes involving the dorsal spine.     Impression: 1.No definite evidence for a pulmonary embolus. 2.There are some groundglass changes within the upper lobes and to a lesser degree the right middle lobe in addition to basilar areas where there is more focal airspace disease. This could relate to multifocal inflammatory infectious process. Findings associated with aspiration not entirely excluded. 3.Coronary artery calcification. Electronically Signed: Heladio Justice MD  3/12/2025 8:53 PM EDT  Workstation ID: [id number]    XR Chest 1 View  Result Date: 3/12/2025  XR CHEST 1 VW Date of Exam: 3/12/2025 7:40 PM EDT Indication: SOA triage protocol Comparison: November 26, 2018 Findings: Heart not definitely enlarged. The lungs seem clear. There are no pleural effusions.     Impression: An acute pulmonary process is not apparent. Electronically Signed: Heladio Justice MD  3/12/2025 8:08 PM EDT  Workstation ID: [id number]                  Plan for Follow-up of Pending Labs/Results: Inbox   Pending Labs       Order Current Status    Blood Culture - Blood,  Arm, Left Preliminary result    Blood Culture - Blood, Arm, Right Preliminary result          Discharge Details        Discharge Medications        New Medications        Instructions Start Date   amoxicillin-clavulanate 875-125 MG per tablet  Commonly known as: AUGMENTIN   1 tablet, Oral, 2 Times Daily             Continue These Medications        Instructions Start Date   aspirin 81 MG EC tablet   81 mg, Daily      Azelastine HCl 137 MCG/SPRAY solution   USE 1 SPRAY IN EACH NOSTRIL AS DIRECTED  TWICE A DAY AS NEEDED FOR RHINITIS OR ALLERGIES AS DIRECTED.      benazepril 40 MG tablet  Commonly known as: LOTENSIN   Take 1 tablet by mouth Daily.      esomeprazole 20 MG capsule  Commonly known as: nexIUM   20 mg, As Needed      Gas Relief 80 MG chewable tablet  Generic drug: simethicone   1 tablet after meals and at bedtime Orally Four times a day      Gemtesa 75 MG tablet  Generic drug: Vibegron   75 mg, Oral, Daily      hydroCHLOROthiazide 12.5 MG capsule  Commonly known as: MICROZIDE   12.5 mg, Every Morning      ISOtretinoin 40 MG capsule  Commonly known as: ACCUTANE   1 capsule, Every 12 Hours Scheduled      levothyroxine 50 MCG tablet  Commonly known as: SYNTHROID, LEVOTHROID   50 mcg, Every Morning      metoprolol succinate XL 25 MG 24 hr tablet  Commonly known as: TOPROL-XL   25 mg, Daily      metroNIDAZOLE 0.75 % gel  Commonly known as: METROGEL   Daily      montelukast 10 MG tablet  Commonly known as: SINGULAIR   10 mg, Oral, Nightly      multivitamin with minerals tablet tablet   1 tablet, Daily      mupirocin 2 % ointment  Commonly known as: BACTROBAN   1 Application, 2 Times Daily      nortriptyline 10 MG capsule  Commonly known as: PAMELOR   30 mg, Nightly      simvastatin 20 MG tablet  Commonly known as: ZOCOR   20 mg, Nightly      venlafaxine 225 MG tablet sustained-release 24 hour 24 hr tablet   1 tablet with food Orally Once a day STOP Caps Change to Tabs 30 days      XIIDRA OP   1 drop, 2 Times  Daily               Allergies   Allergen Reactions    Chlorhexidine Rash    Clindamycin/Lincomycin Rash     itching    Neosporin [Bacitracin-Polymyxin B] Itching and Rash         Discharge Disposition:  Home-Health Care Svc    Diet:  Hospital:  Diet Order   Procedures    Diet: Cardiac; Healthy Heart (2-3 Na+); Fluid Consistency: Thin (IDDSI 0)            Activity:      Restrictions or Other Recommendations:       CODE STATUS:    Code Status and Medical Interventions: CPR (Attempt to Resuscitate); Full Support   Ordered at: 03/12/25 6949     Code Status (Patient has no pulse and is not breathing):    CPR (Attempt to Resuscitate)     Medical Interventions (Patient has pulse or is breathing):    Full Support     Level Of Support Discussed With:    Patient       Future Appointments   Date Time Provider Department Center   3/26/2025  8:40 AM Wes Patel APRN [facility]   7/31/2025 10:45 AM Salome Benz MD [facility]   8/26/2025  8:45 AM Anastacia Gary MD [facility]       Additional Instructions for the Follow-ups that You Need to Schedule       Ambulatory Referral to Home Health   As directed      Face to Face Visit Date: 3/14/2025   Follow-up provider for Plan of Care?: I treated the patient in an acute care facility and will not continue treatment after discharge.   Follow-up provider: VERNON SHEIKH [[id number]]   Reason/Clinical Findings: right knee replacement   Describe mobility limitations that make leaving home difficult: impaired mobility   Nursing/Therapeutic Services Requested: Physical Therapy   Frequency: 1 Week 1        Discharge Follow-up with PCP   As directed       Currently Documented PCP:    Hernandez Jackman MD    PCP Phone Number:    [phone]     Follow Up Details: 1 week        Discharge Follow-up with Specified Provider: Dr Bond, Orthopedics, as scheduled   As directed      To: Dr Bond Orthopedics, as scheduled                       January Servin,   03/14/25      Time Spent on Discharge:  I spent  50  minutes on this discharge activity which included: face-to-face encounter with the patient, reviewing the data in the system, coordination of the care with the nursing staff as well as consultants, documentation, and entering orders.

## 2025-03-15 ENCOUNTER — READMISSION MANAGEMENT (OUTPATIENT)
Dept: CALL CENTER | Facility: HOSPITAL | Age: 75
End: 2025-03-15
Payer: MEDICARE

## 2025-03-15 NOTE — OUTREACH NOTE
Prep Survey      Flowsheet Row Responses   [facility] facility patient discharged from? [place]   Is LACE score < 7 ? No   Eligibility Readm Mgmt   Discharge diagnosis *Aspiration pneumonia   Does the patient have one of the following disease processes/diagnoses(primary or secondary)? Pneumonia   Does the patient have Home health ordered? Yes   What is the Home health agency?  [facility]   Is there a DME ordered? Yes   What DME was ordered? CLAUDIA   Prep survey completed? Yes            CHRIS JONES - Registered Nurse

## 2025-03-17 LAB
BACTERIA SPEC AEROBE CULT: NORMAL
BACTERIA SPEC AEROBE CULT: NORMAL

## 2025-03-18 LAB
QT INTERVAL: 402 MS
QTC INTERVAL: 460 MS

## 2025-03-19 ENCOUNTER — READMISSION MANAGEMENT (OUTPATIENT)
Dept: CALL CENTER | Facility: HOSPITAL | Age: 75
End: 2025-03-19
Payer: MEDICARE

## 2025-03-19 NOTE — OUTREACH NOTE
"COPD/PN Week 1 Survey      Flowsheet Row Responses   [facility] patient discharged from? [place]   Does the patient have one of the following disease processes/diagnoses(primary or secondary)? Pneumonia   Week 1 attempt successful? Yes   Call end time 1039   Discharge diagnosis *Aspiration pneumonia   Meds reviewed with patient/caregiver? Yes   Is the patient having any side effects they believe may be caused by any medication additions or changes? No   Does the patient have all medications ordered at discharge? Yes   Is the patient taking all medications as directed (includes completed medication regime)? Yes   Does the patient have a primary care provider?  Yes   Does the patient have an appointment with their PCP or specialist within 7 days of discharge? Yes   Has the patient kept scheduled appointments due by today? N/A   What is the Home health agency?  [facility]   Has home health visited the patient within 72 hours of discharge? Yes   Pulse Ox monitoring Intermittent   Pulse Ox device source Patient   O2 Sat: education provided Sat levels   Psychosocial issues? No   Did the patient receive a copy of their discharge instructions? Yes   Nursing interventions Reviewed instructions with patient   What is the patient's perception of their health status since discharge? Improving   Is the patient/caregiver able to teach back the hierarchy of who to call/visit for symptoms/problems? PCP, Specialist, Home health nurse, Urgent Care, ED, 911 Yes   Is the patient/caregiver able to teach back signs and symptoms of worsening condition: Fever/chills, Chest pain, Shortness of breath   Is the patient/caregiver able to teach back importance of completing antibiotic course of treatment? Yes   Week 1 call completed? Yes   Graduated Yes   Graduated/Revoked comments Pt states he is doing \"good\".  With no questions or needs at this time.   Call end time 1039            Zeenat WALLIS - Licensed Nurse  "

## 2025-04-07 ENCOUNTER — TELEPHONE (OUTPATIENT)
Dept: PULMONOLOGY | Facility: CLINIC | Age: 75
End: 2025-04-07
Payer: MEDICARE

## 2025-04-07 NOTE — TELEPHONE ENCOUNTER
Spoke with patient and [employer] will be reaching out to get him supplies and the mask he has requested.

## 2025-04-07 NOTE — TELEPHONE ENCOUNTER
Hub staff attempted to follow warm transfer process and was unsuccessful     Caller: José Miguel Jerry    Relationship to patient: Self    Best call back number: [phone]     Patient is needing: PATIENT REQUESTED TO SPEAK WITH SOMEONE IN THE OFFICE. HE STATES HE HAS BEEN OUT OF CPAP SUPPLIES AND WHEN HE CALLED HIS DME COMPANY ([employer]) HE WAS TOLD THEY RECEIVED THE PRESCRIPTION FROM  BUT WERE UNABLE TO DO ANYTHING. HE IS WANTING TO KNOW WHAT CAN BE DONE/WHERE HE CAN GO TO GET SUPPLIES.     PLEASE CALL TO ADVISE. HE VERY SPECIFICALLY WANTS A CALL BACK TO TALK WITH SOMEONE.

## 2025-04-17 ENCOUNTER — OFFICE VISIT (OUTPATIENT)
Dept: UROLOGY | Facility: CLINIC | Age: 75
End: 2025-04-17
Payer: MEDICARE

## 2025-04-17 VITALS
HEIGHT: 73 IN | HEART RATE: 80 BPM | WEIGHT: 222 LBS | BODY MASS INDEX: 29.42 KG/M2 | DIASTOLIC BLOOD PRESSURE: 84 MMHG | TEMPERATURE: 98.4 F | OXYGEN SATURATION: 100 % | SYSTOLIC BLOOD PRESSURE: 172 MMHG

## 2025-04-17 DIAGNOSIS — N39.46 MIXED STRESS AND URGE URINARY INCONTINENCE: ICD-10-CM

## 2025-04-17 DIAGNOSIS — E29.1 HYPOGONADISM IN MALE: Primary | ICD-10-CM

## 2025-04-17 DIAGNOSIS — D75.1 POLYCYTHEMIA: ICD-10-CM

## 2025-04-17 RX ORDER — ONDANSETRON 4 MG/1
TABLET, FILM COATED ORAL
COMMUNITY
Start: 2025-03-11

## 2025-04-17 NOTE — PROGRESS NOTES
Office Visit     Patient Name: José Miguel Jerry  : 1950   MRN: [id number]     Patient or patient representative verbalized consent for the use of Ambient Listening during the visit with  ERENDIRA Simmons for chart documentation. 2025  09:39 EDT    Chief Complaint:   Chief Complaint   Patient presents with    Follow-up     Hypogonadism in male      Referring Provider: No ref. provider found    Primary Care Provider: Hernandez Jackman MD     History of Present Illness  The patient is a 75-year-old male with a history of hypogonadism, hypertension, and incontinence.    Hypogonadism  - Prefers oral medication over pellets due to bruising and pellet extrusion.  - Uses BiPAP nightly.  - Consulted Dr. Treviño (hematology).  - Signed self-injection forms.    Hypertension  - Lower leg swelling attributed to amlodipine resolved after discontinuation.  - Elevated BP readings at home, notably 175 this morning, significantly higher than usual 120s.  - Plans to discuss alternative treatments with PCP.  - Previously on amlodipine and benazepril; amlodipine discontinued due to swelling.    Incontinence  - Incontinence has worsened.  - Wakes up with wet pad.  - Read about artificial sphincter and mesh bladder sling.  - Contracts muscles during cough/sneeze, but sudden cough/sneeze causes leakage.  - Tried pelvic floor PT without noticeable improvement.  - Had surgery at [facility] under Dr. Plaza (retired).  - Consulted Dr. Jackman, who recommended [facility] for advanced urology therapies.  - Appointment scheduled at [facility]       Subjective   Review of System:   As noted in HPI.    Past Medical History:   Diagnosis Date    Abnormal stress test 2018    1. MPS 18: Positive EKG changes Reversible Inferior ischemia EF 60%      Depression     Dyspnea on exertion     Elevated cholesterol     Erectile dysfunction     History of echocardiogram 2018    History of exercise stress test  08/2018    History of pneumonia 03/21/2019    Reports 40+ years ago    Hormone disorder 2013    Hypogonadism    Hypertension     Prostate cancer 2012    Seasonal allergies     reports mild    Sleep apnea     Uses CPAP HS - instructed to bring in DOS    Urinary incontinence June 2024    Wears glasses      Past Surgical History:   Procedure Laterality Date    APPENDECTOMY      BLEPHAROPLASTY Bilateral     CARDIAC CATHETERIZATION N/A 08/20/2018    Procedure: Left Heart Cath;  Surgeon: Anastacia Gary MD;  Location:  [facility];  Service: Cardiology    CARDIOVASCULAR STRESS TEST  12/14/2021    CATARACT EXTRACTION, BILATERAL      CHOLECYSTECTOMY N/A 12/29/2021    Procedure: CHOLECYSTECTOMY LAPAROSCOPIC;  Surgeon: Caleb Edmond MD;  Location:  [facility];  Service: General;  Laterality: N/A;    COLONOSCOPY      HEAD/NECK LESION/CYST EXCISION Right 10/31/2016    Procedure: EXCISE BCCA RIGHT SCALP;  Surgeon: Lowell Rider MD;  Location:  [facility];  Service:     KNEE ARTHROPLASTY, PARTIAL REPLACEMENT      NASAL SEPTUM SURGERY      PENILE PROSTHESIS IMPLANT N/A 11/28/2018    Procedure: PENILE PROSTHESIS PLACEMENT;  Surgeon: Nick Sommer MD;  Location:  [facility];  Service: Urology    PROSTATE SURGERY      SKIN BIOPSY      patient reports basal cell carcinoma removed from head x3 places    TEETH EXTRACTION       Family History   Problem Relation Age of Onset    Hypertension Mother     Heart disease Mother         A fib    Kidney disease Father         Alcohol abuse    Liver disease Father     Heart disease Father     Hypertension Father     Heart attack Sister         Atrial fibrillation    Heart attack Brother     Heart disease Maternal Grandfather         Enlarged heart    Hypertension Maternal Grandfather     Heart attack Maternal Grandfather     Heart disease Paternal Grandfather     Hypertension Paternal Grandfather         Hypertension    Heart attack Paternal Grandfather     Heart disease  Daughter      Social History     Socioeconomic History    Marital status:    Tobacco Use    Smoking status: Never     Passive exposure: Never    Smokeless tobacco: Never   Vaping Use    Vaping status: Never Used   Substance and Sexual Activity    Alcohol use: Yes     Alcohol/week: 2.0 standard drinks of alcohol     Types: 1 Glasses of wine, 1 Cans of beer per week     Comment: Social use, reports no HX of abuse    Drug use: Never    Sexual activity: Yes     Partners: Female     Birth control/protection: None       Current Outpatient Medications:     aspirin 81 MG EC tablet, Take 1 tablet by mouth Daily., Disp: , Rfl:     Azelastine HCl 137 MCG/SPRAY solution, USE 1 SPRAY IN EACH NOSTRIL AS DIRECTED  TWICE A DAY AS NEEDED FOR RHINITIS OR ALLERGIES AS DIRECTED., Disp: 90 mL, Rfl: 2    benazepril (LOTENSIN) 40 MG tablet, Take 1 tablet by mouth Daily., Disp: , Rfl:     esomeprazole (nexIUM) 20 MG capsule, Take 1 capsule by mouth As Needed., Disp: , Rfl:     Gas Relief 80 MG chewable tablet, 1 tablet after meals and at bedtime Orally Four times a day, Disp: , Rfl:     hydrochlorothiazide (MICROZIDE) 12.5 MG capsule, Take 1 capsule by mouth Every Morning., Disp: , Rfl:     ISOtretinoin (ACCUTANE) 40 MG capsule, Take 1 capsule by mouth Every 12 (Twelve) Hours., Disp: , Rfl:     levothyroxine (SYNTHROID, LEVOTHROID) 50 MCG tablet, Take 1 tablet by mouth Every Morning., Disp: , Rfl:     Lifitegrast (XIIDRA OP), Apply 1 drop to eye(s) as directed by provider 2 (Two) Times a Day., Disp: , Rfl:     metoprolol succinate XL (TOPROL-XL) 25 MG 24 hr tablet, Take 1 tablet by mouth Daily., Disp: , Rfl:     metroNIDAZOLE (METROGEL) 0.75 % gel, Apply 1 application topically to the appropriate area as directed Daily., Disp: , Rfl:     montelukast (SINGULAIR) 10 MG tablet, Take 1 tablet by mouth Every Night., Disp: 90 tablet, Rfl: 2    Multiple Vitamins-Minerals (MULTIVITAMIN ADULTS PO), Take 1 tablet by mouth Daily., Disp: , Rfl:  "    mupirocin (BACTROBAN) 2 % ointment, Apply 1 Application topically to the appropriate area as directed 2 (Two) Times a Day. Apply topically to the affected area twice daily, Disp: , Rfl:     nortriptyline (PAMELOR) 10 MG capsule, Take 3 capsules by mouth Every Night., Disp: , Rfl:     ondansetron (ZOFRAN) 4 MG tablet, , Disp: , Rfl:     simvastatin (ZOCOR) 20 MG tablet, Take 1 tablet by mouth Every Night., Disp: , Rfl:     venlafaxine 225 MG tablet sustained-release 24 hour 24 hr tablet, 1 tablet with food Orally Once a day STOP Caps Change to Tabs 30 days, Disp: , Rfl:     Vibegron (Gemtesa) 75 MG tablet, Take 1 tablet by mouth Daily., Disp: 42 tablet, Rfl: 0    Allergies   Allergen Reactions    Chlorhexidine Rash    Clindamycin/Lincomycin Rash     itching    Neosporin [Bacitracin-Polymyxin B] Itching and Rash     Objective   Visit Vitals  /84 (BP Location: Right arm, Patient Position: Sitting, Cuff Size: Adult)   Pulse 80   Temp 98.4 °F (36.9 °C) (Temporal)   Ht 185.4 cm (72.99\")   Wt 101 kg (222 lb)   SpO2 100%   BMI 29.30 kg/m²        Body mass index is 29.3 kg/m².     Physical Exam  Vitals and nursing note reviewed.   Constitutional:       General: He is not in acute distress.     Appearance: Normal appearance. He is overweight. He is not ill-appearing.   Pulmonary:      Effort: Pulmonary effort is normal. No respiratory distress.   Skin:     General: Skin is warm and dry.   Neurological:      General: No focal deficit present.      Mental Status: He is alert and oriented to person, place, and time.   Psychiatric:         Mood and Affect: Mood normal.         Behavior: Behavior normal.         Labs  Lab Results   Component Value Date    COLORU Yellow 03/12/2025    CLARITYU Clear 03/12/2025    SPECGRAV 1.020 10/21/2024    PHUR 5.5 03/12/2025    LEUKOCYTESUR Negative 03/12/2025    NITRITE Negative 10/21/2024    PROTEINPOCUA Negative 10/21/2024    GLUCOSEUR Negative 10/21/2024    KETONESU Negative " "03/12/2025    UROBILINOGEN 0.2 E.U./dL 03/12/2025    BILIRUBINUR Negative 03/12/2025    RBCUR Negative 10/21/2024      No results found for: \"WBCUA\", \"RBCUA\", \"BACTERIA\", \"HYALCASTU\", \"SQUAMEPIUA\"     Lab Results   Component Value Date    WBC 10.29 03/14/2025    HGB 15.9 03/14/2025    HCT 50.4 03/14/2025    MCV 91.5 03/14/2025     (L) 03/14/2025     Lab Results   Component Value Date    GLUCOSE 85 03/14/2025    CALCIUM 8.2 (L) 03/14/2025     (L) 03/14/2025    K 4.1 03/14/2025    CO2 23.0 03/14/2025     03/14/2025    BUN 15 03/14/2025    BUN 16 03/13/2025    CREATININE 0.75 (L) 03/14/2025    CREATININE 1.06 03/13/2025    EGFR 94.7 03/14/2025    EGFR 73.6 03/13/2025    BCR 20.0 03/14/2025    ANIONGAP 10.0 03/14/2025    ALT 30 03/12/2025    AST 26 03/12/2025       Lab Results   Component Value Date    HGBA1C 5.70 (H) 03/13/2025        Lab Results   Component Value Date    PSA <0.014 11/25/2024    PSA <0.014 07/05/2024    PSA <0.014 07/27/2023       No results found for: \"URICACIDSTN\", \"IOAB5OJTGWQ\", \"TBWJ3QEWVN\", \"LABMAGN\"  No results found for: \"PPJR19VG\", \"CAION\", \"PTH\", \"URICACID\"  No results found for: \"CYSTINE\", \"URINEVOLUM\", \"CALCIUMUR\", \"OXALATEU\", \"CITRATEUR\", \"LABPH\", \"URICUR\", \"NAUR\", \"KUR\", \"MAGNESIUMUR\", \"PHOSUR\", \"AMMONIUMUR\", \"CLUR\", \"[id number]\", \"UREAUR\", \"LABCREAUR\"    Lab Results   Component Value Date    TESTOSTEROTT 404 11/25/2024    TESTOSTEROTT 361 04/22/2024    TESTOSTEROTT 416 01/22/2024    TESTFRE 7.3 11/25/2024    TESTFRE 7.2 04/22/2024    TESTFRE 11.9 01/22/2024    PSA <0.014 11/25/2024    ESTRADIOL 41.5 03/27/2023       Lab Results   Component Value Date    SEXMONB 15.5 (L) 07/27/2023    TESTOSTEROTT 404 11/25/2024    TESTFRE 7.3 11/25/2024         Radiographic Studies  CT Angiogram Chest Pulmonary Embolism  Result Date: 3/12/2025  Impression: 1.No definite evidence for a pulmonary embolus. 2.There are some groundglass changes within the upper lobes and to a lesser " degree the right middle lobe in addition to basilar areas where there is more focal airspace disease. This could relate to multifocal inflammatory infectious process. Findings associated with aspiration not entirely excluded. 3.Coronary artery calcification. Electronically Signed: Heladio Justice MD  3/12/2025 8:53 PM EDT  Workstation ID: [id number]    XR Chest 1 View  Result Date: 3/12/2025  Impression: An acute pulmonary process is not apparent. Electronically Signed: Heladio Justice MD  3/12/2025 8:08 PM EDT  Workstation ID: [id number]      I have reviewed the above labs and imaging.     Assessment / Plan      Diagnoses and all orders for this visit:    1. Hypogonadism in male (Primary)  -     Testosterone  -     Hemoglobin & Hematocrit, Blood  -     Estradiol    2. Polycythemia  -     Hemoglobin & Hematocrit, Blood    3. Mixed stress and urge urinary incontinence         Assessment & Plan  1. Hypogonadism: Testosterone levels in 400 range. No updated labs today, Elevated hematocrit may contribute to fatigue and poor sleep.  - Testopel canceled today due to no current labs and polycythemia  - Labs today for testosterone, hematocrit, hemoglobin, estradiol  - Discussed testosterone therapy options: gels, oral meds, long-acting injections, subcutaneous injections (Xyosted), traditional testosterone cypionate injections, pellets  - Prefers oral medication due to bruising and pellet extrusion  - Oral testosterone suitable with lower polycythemia risk  - Discussed Jatenzo and Tlando side effects, including BP elevation  - Discuss testosterone gel and clomid as options   - Explained refill process via MyChart  -   2. Polycythemia:   - continue therapeutic phlebotomy    3. Incontinence: Worsening.  - Leakage during sleep, stress incontinence with cough/sneeze  - Tried pelvic floor PT without improvement  - Informed about artificial sphincter and bladder sling  - Upcoming [place] appointment for further evaluation and  treatment    Follow-up in 3 months.       Return in about 3 months (around 7/17/2025) for Pedro Armando, MSN, APRN, FNP-C  [facility]

## 2025-04-18 LAB
ESTRADIOL SERPL-MCNC: 39.1 PG/ML (ref 7.6–42.6)
HCT VFR BLD AUTO: 53.3 % (ref 37.5–51)
HGB BLD-MCNC: 16.8 G/DL (ref 13–17.7)
TESTOST SERPL-MCNC: 334 NG/DL (ref 264–916)

## 2025-04-23 ENCOUNTER — RESULTS FOLLOW-UP (OUTPATIENT)
Dept: UROLOGY | Facility: CLINIC | Age: 75
End: 2025-04-23
Payer: MEDICARE

## 2025-04-23 DIAGNOSIS — E29.1 HYPOGONADISM IN MALE: Primary | ICD-10-CM

## 2025-04-23 RX ORDER — TESTOSTERONE GEL, 1% 10 MG/G
50 GEL TRANSDERMAL DAILY
Qty: 30 EACH | Refills: 0 | Status: SHIPPED | OUTPATIENT
Start: 2025-04-23 | End: 2025-05-23

## 2025-04-30 ENCOUNTER — TELEPHONE (OUTPATIENT)
Dept: UROLOGY | Facility: CLINIC | Age: 75
End: 2025-04-30

## 2025-04-30 NOTE — TELEPHONE ENCOUNTER
The [facility] received a fax that requires your attention. The document has been indexed to the patient’s chart for your review.      Reason for sending: [facility] FAX    Documents Description: EXT MED RECS - [facility] - 4.30.25    Name of Sender: [facility]    Date Indexed: 4.30.25    Notes (if needed): INCLUDES SAFETY AND HEALTH CONSIDERATION INFORMATION

## 2025-05-16 ENCOUNTER — TELEPHONE (OUTPATIENT)
Dept: UROLOGY | Facility: CLINIC | Age: 75
End: 2025-05-16

## 2025-05-16 NOTE — TELEPHONE ENCOUNTER
The [facility] received a fax that requires your attention. The document has been indexed to the patient’s chart for your review.      Reason for sending: INCOMING FAX    Documents Description: EXT MED RECS - [facility] - 5.16.25    Name of Sender: [facility]    Date Indexed: 5.16.25    Notes (if needed): [facility] PATIENT SAFETY AND HEALTH CONSIDERATION

## 2025-06-10 DIAGNOSIS — E29.1 HYPOGONADISM IN MALE: ICD-10-CM

## 2025-06-10 RX ORDER — TESTOSTERONE GEL, 1% 10 MG/G
50 GEL TRANSDERMAL DAILY
Qty: 30 EACH | Refills: 0 | Status: SHIPPED | OUTPATIENT
Start: 2025-06-10 | End: 2025-07-10

## 2025-06-10 NOTE — TELEPHONE ENCOUNTER
Caller: ELEANOR MONTERO    Relationship: SELF    Best call back number: [phone]    Requested Prescriptions: testosterone (AndroGel) 50 MG/5GM (1%) gel gel ()   Requested Prescriptions      No prescriptions requested or ordered in this encounter        Pharmacy where request should be sent:      [id number] - [address] [phone] [facility] [phone] FX     Last office visit with prescribing clinician: 2025   Last telemedicine visit with prescribing clinician: Visit date not found   Next office visit with prescribing clinician: 2025     Additional details provided by patient: PT HAS AROUND 5 DAY SUPPLY OF MEDICINE LEFT. PLEASE CALL PAT WITH ANY CONCERNS AND WHEN SCRIPT HAS BEEN SENT TO PHARMACY    Does the patient have less than a 3 day supply:  [] Yes  [x] No    Would you like a call back once the refill request has been completed: [x] Yes [] No    If the office needs to give you a call back, can they leave a voicemail: [x] Yes [] No    Jackie Fairchild Rep   06/10/25 15:35 EDT        [phone]

## 2025-06-19 ENCOUNTER — TELEPHONE (OUTPATIENT)
Dept: UROLOGY | Facility: CLINIC | Age: 75
End: 2025-06-19
Payer: MEDICARE

## 2025-06-19 DIAGNOSIS — E29.1 HYPOGONADISM IN MALE: ICD-10-CM

## 2025-06-19 RX ORDER — TESTOSTERONE GEL, 1% 10 MG/G
50 GEL TRANSDERMAL DAILY
Qty: 30 EACH | Refills: 0 | Status: SHIPPED | OUTPATIENT
Start: 2025-06-19 | End: 2025-07-19

## 2025-06-19 NOTE — TELEPHONE ENCOUNTER
His Androgel was sent to [facility] it was supposed to be sent to [facility] in [facility]

## 2025-06-19 NOTE — TELEPHONE ENCOUNTER
Pt is calling for his Androgel refill.  States he has left numerous VM on Nurse line last week but hasn't heard anything   [facility] in [facility] is his pharmacy

## 2025-06-23 ENCOUNTER — TELEPHONE (OUTPATIENT)
Dept: UROLOGY | Facility: CLINIC | Age: 75
End: 2025-06-23

## 2025-06-23 NOTE — TELEPHONE ENCOUNTER
The [facility] received a fax that requires your attention. The document has been indexed to the patient’s chart for your review.      Reason for sending: FOR PROVIDER REVIEW    Documents Description: PATIENT SAFETY AND HEALTH/DRUG SAFETY CONSIDERATION NOTICE:TESTOSTERONE AND SLEEP APNEA    Name of Sender: [facility]    Date Indexed: 06/13/2025    Notes (if needed): PLEASE REVIEW AT YOU R CONVENIENCE. PATIENT IS ON TESTOSTERONE AND HAS BEEN DIAGNOSED WITH SLEEP APNEA AND THE TESTOSTERONE TREATMENT MAY POTENTIATE SLEEP APNEA AS A RESULT.

## 2025-07-17 ENCOUNTER — OFFICE VISIT (OUTPATIENT)
Dept: UROLOGY | Facility: CLINIC | Age: 75
End: 2025-07-17
Payer: MEDICARE

## 2025-07-17 VITALS
HEART RATE: 82 BPM | SYSTOLIC BLOOD PRESSURE: 122 MMHG | DIASTOLIC BLOOD PRESSURE: 64 MMHG | OXYGEN SATURATION: 100 % | BODY MASS INDEX: 29.42 KG/M2 | TEMPERATURE: 98.4 F | HEIGHT: 73 IN | WEIGHT: 222 LBS

## 2025-07-17 DIAGNOSIS — E29.1 HYPOGONADISM IN MALE: Primary | ICD-10-CM

## 2025-07-17 DIAGNOSIS — D75.1 POLYCYTHEMIA: ICD-10-CM

## 2025-07-17 DIAGNOSIS — N42.9 PROSTATE DISORDER: ICD-10-CM

## 2025-07-17 DIAGNOSIS — Z85.46 HISTORY OF PROSTATE CANCER: ICD-10-CM

## 2025-07-17 RX ORDER — TESTOSTERONE GEL, 1% 10 MG/G
50 GEL TRANSDERMAL DAILY
Qty: 30 EACH | Refills: 0 | Status: SHIPPED | OUTPATIENT
Start: 2025-07-17 | End: 2025-08-16

## 2025-07-17 RX ORDER — PROPRANOLOL HYDROCHLORIDE 60 MG/1
60 CAPSULE, EXTENDED RELEASE ORAL DAILY
COMMUNITY
Start: 2025-05-07

## 2025-07-17 NOTE — PROGRESS NOTES
Office Visit     Patient Name: José Miguel Jerry  : 1950   MRN: [id number]     Patient or patient representative verbalized consent for the use of Ambient Listening during the visit with  ERENDIRA Simmons for chart documentation. 2025  07:47 EDT    Chief Complaint:   Chief Complaint   Patient presents with    Follow-up     Hypogonadism in male         Referring Provider: No ref. provider found    Primary Care Provider: Hernandez Jakcman MD     History of Present Illness  The patient presents for testosterone management.    Testosterone Refills  - He reports difficulties obtaining testosterone refills despite multiple attempts to contact the pharmacy and office.  - He requests a refill as his current supply is running low.    Testosterone Replacement Therapy  - He is considering switching to Testopel pellets for testosterone replacement therapy.  - He donates blood monthly, which he believes helps manage his condition.  - He applies the gel to his shoulder daily but finds it inconvenient due to sweating during work.  - He prefers pellets due to less frequent administration.    PSA Tests  - His family doctor orders quarterly PSA tests    Prostatectomy  - He had a prostatectomy.    PAST SURGICAL HISTORY:  Prostatectomy      Subjective   Review of System:   As noted in HPI.    Past Medical History:   Diagnosis Date    Abnormal stress test 2018    1. MPS 8-8-18: Positive EKG changes Reversible Inferior ischemia EF 60%      Depression     Dyspnea on exertion     Elevated cholesterol     Erectile dysfunction     History of echocardiogram 2018    History of exercise stress test 2018    History of pneumonia 2019    Reports 40+ years ago    Hormone disorder 2013    Hypogonadism    Hypertension     Prostate cancer 2012    Seasonal allergies     reports mild    Sleep apnea     Uses CPAP HS - instructed to bring in DOS    Urinary incontinence 2024    Wears glasses      Past  Surgical History:   Procedure Laterality Date    APPENDECTOMY      BLEPHAROPLASTY Bilateral     CARDIAC CATHETERIZATION N/A 08/20/2018    Procedure: Left Heart Cath;  Surgeon: Anastacia Gary MD;  Location:  [facility];  Service: Cardiology    CARDIOVASCULAR STRESS TEST  12/14/2021    CATARACT EXTRACTION, BILATERAL      CHOLECYSTECTOMY N/A 12/29/2021    Procedure: CHOLECYSTECTOMY LAPAROSCOPIC;  Surgeon: Caleb Edmond MD;  Location:  [facility];  Service: General;  Laterality: N/A;    COLONOSCOPY      HEAD/NECK LESION/CYST EXCISION Right 10/31/2016    Procedure: EXCISE BCCA RIGHT SCALP;  Surgeon: Lowell Rider MD;  Location:  [facility];  Service:     KNEE ARTHROPLASTY, PARTIAL REPLACEMENT      NASAL SEPTUM SURGERY      PENILE PROSTHESIS IMPLANT N/A 11/28/2018    Procedure: PENILE PROSTHESIS PLACEMENT;  Surgeon: Nick Sommer MD;  Location:  [facility];  Service: Urology    PROSTATE SURGERY      SKIN BIOPSY      patient reports basal cell carcinoma removed from head x3 places    TEETH EXTRACTION       Family History   Problem Relation Age of Onset    Hypertension Mother     Heart disease Mother         A fib    Kidney disease Father         Alcohol abuse    Liver disease Father     Heart disease Father     Hypertension Father     Heart attack Sister         Atrial fibrillation    Heart attack Brother     Heart disease Maternal Grandfather         Enlarged heart    Hypertension Maternal Grandfather     Heart attack Maternal Grandfather     Heart disease Paternal Grandfather     Hypertension Paternal Grandfather         Hypertension    Heart attack Paternal Grandfather     Heart disease Daughter      Social History     Socioeconomic History    Marital status:    Tobacco Use    Smoking status: Never     Passive exposure: Never    Smokeless tobacco: Never   Vaping Use    Vaping status: Never Used   Substance and Sexual Activity    Alcohol use: Yes     Alcohol/week: 2.0 standard drinks of  alcohol     Types: 1 Glasses of wine, 1 Cans of beer per week     Comment: Social use, reports no HX of abuse    Drug use: Never    Sexual activity: Yes     Partners: Female     Birth control/protection: None       Current Outpatient Medications:     aspirin 81 MG EC tablet, Take 1 tablet by mouth Daily., Disp: , Rfl:     Azelastine HCl 137 MCG/SPRAY solution, USE 1 SPRAY IN EACH NOSTRIL AS DIRECTED  TWICE A DAY AS NEEDED FOR RHINITIS OR ALLERGIES AS DIRECTED., Disp: 90 mL, Rfl: 2    benazepril (LOTENSIN) 40 MG tablet, Take 1 tablet by mouth Daily., Disp: , Rfl:     esomeprazole (nexIUM) 20 MG capsule, Take 1 capsule by mouth As Needed., Disp: , Rfl:     Gas Relief 80 MG chewable tablet, 1 tablet after meals and at bedtime Orally Four times a day, Disp: , Rfl:     hydrochlorothiazide (MICROZIDE) 12.5 MG capsule, Take 1 capsule by mouth Every Morning., Disp: , Rfl:     ISOtretinoin (ACCUTANE) 40 MG capsule, Take 1 capsule by mouth Every 12 (Twelve) Hours., Disp: , Rfl:     levothyroxine (SYNTHROID, LEVOTHROID) 50 MCG tablet, Take 1 tablet by mouth Every Morning., Disp: , Rfl:     Lifitegrast (XIIDRA OP), Apply 1 drop to eye(s) as directed by provider 2 (Two) Times a Day., Disp: , Rfl:     metoprolol succinate XL (TOPROL-XL) 25 MG 24 hr tablet, Take 1 tablet by mouth Daily., Disp: , Rfl:     metroNIDAZOLE (METROGEL) 0.75 % gel, Apply 1 application topically to the appropriate area as directed Daily., Disp: , Rfl:     montelukast (SINGULAIR) 10 MG tablet, Take 1 tablet by mouth Every Night., Disp: 90 tablet, Rfl: 2    Multiple Vitamins-Minerals (MULTIVITAMIN ADULTS PO), Take 1 tablet by mouth Daily., Disp: , Rfl:     mupirocin (BACTROBAN) 2 % ointment, Apply 1 Application topically to the appropriate area as directed 2 (Two) Times a Day. Apply topically to the affected area twice daily, Disp: , Rfl:     nortriptyline (PAMELOR) 10 MG capsule, Take 3 capsules by mouth Every Night., Disp: , Rfl:     propranolol LA  "(INDERAL LA) 60 MG 24 hr capsule, Take 1 capsule by mouth Daily., Disp: , Rfl:     simvastatin (ZOCOR) 20 MG tablet, Take 1 tablet by mouth Every Night., Disp: , Rfl:     testosterone (AndroGel) 50 MG/5GM (1%) gel gel, Place 50 mg on the skin as directed by provider Daily for 30 days., Disp: 30 each, Rfl: 0    venlafaxine 225 MG tablet sustained-release 24 hour 24 hr tablet, 1 tablet with food Orally Once a day STOP Caps Change to Tabs 30 days, Disp: , Rfl:     ondansetron (ZOFRAN) 4 MG tablet, , Disp: , Rfl:     Vibegron (Gemtesa) 75 MG tablet, Take 1 tablet by mouth Daily., Disp: 42 tablet, Rfl: 0    Allergies   Allergen Reactions    Chlorhexidine Rash    Clindamycin/Lincomycin Rash     itching    Latex Rash    Neosporin [Bacitracin-Polymyxin B] Itching and Rash     Objective   Visit Vitals  /64 (BP Location: Right arm, Patient Position: Sitting, Cuff Size: Adult)   Pulse 82   Temp 98.4 °F (36.9 °C) (Temporal)   Ht 185.4 cm (72.99\")   Wt 101 kg (222 lb)   SpO2 100%   BMI 29.30 kg/m²        Body mass index is 29.3 kg/m².     Physical Exam  Vitals and nursing note reviewed.   Constitutional:       General: He is not in acute distress.     Appearance: Normal appearance. He is not ill-appearing.   Pulmonary:      Effort: Pulmonary effort is normal. No respiratory distress.   Skin:     General: Skin is warm and dry.   Neurological:      General: No focal deficit present.      Mental Status: He is alert and oriented to person, place, and time.   Psychiatric:         Mood and Affect: Mood normal.         Behavior: Behavior normal.          Labs  Lab Results   Component Value Date    COLORU Yellow 03/12/2025    CLARITYU Clear 03/12/2025    SPECGRAV 1.020 10/21/2024    PHUR 5.5 03/12/2025    LEUKOCYTESUR Negative 03/12/2025    NITRITE Negative 10/21/2024    PROTEINPOCUA Negative 10/21/2024    GLUCOSEUR Negative 10/21/2024    KETONESU Negative 03/12/2025    UROBILINOGEN 0.2 E.U./dL 03/12/2025    BILIRUBINUR Negative " "03/12/2025    RBCUR Negative 10/21/2024      No results found for: \"WBCUA\", \"RBCUA\", \"BACTERIA\", \"HYALCASTU\", \"SQUAMEPIUA\"     Lab Results   Component Value Date    WBC 10.29 03/14/2025    HGB 16.8 04/17/2025    HCT 53.3 (H) 04/17/2025    MCV 91.5 03/14/2025     (L) 03/14/2025     Lab Results   Component Value Date    GLUCOSE 85 03/14/2025    CALCIUM 8.2 (L) 03/14/2025     (L) 03/14/2025    K 4.1 03/14/2025    CO2 23.0 03/14/2025     03/14/2025    BUN 15 03/14/2025    BUN 16 03/13/2025    CREATININE 0.75 (L) 03/14/2025    CREATININE 1.06 03/13/2025    EGFR 94.7 03/14/2025    EGFR 73.6 03/13/2025    BCR 20.0 03/14/2025    ANIONGAP 10.0 03/14/2025    ALT 30 03/12/2025    AST 26 03/12/2025       Lab Results   Component Value Date    HGBA1C 5.70 (H) 03/13/2025        Lab Results   Component Value Date    PSA <0.014 11/25/2024    PSA <0.014 07/05/2024    PSA <0.014 07/27/2023       No results found for: \"URICACIDSTN\", \"NHED0OUFAAY\", \"DPDH8TMAHA\", \"LABMAGN\"  No results found for: \"ZPXC19DQ\", \"CAION\", \"PTH\", \"URICACID\"  No results found for: \"CYSTINE\", \"URINEVOLUM\", \"CALCIUMUR\", \"OXALATEU\", \"CITRATEUR\", \"LABPH\", \"URICUR\", \"NAUR\", \"KUR\", \"MAGNESIUMUR\", \"PHOSUR\", \"AMMONIUMUR\", \"CLUR\", \"CBFTWBVYP70Y\", \"UREAUR\", \"LABCREAUR\"    Lab Results   Component Value Date    TESTOSTEROTT 334 04/17/2025    TESTOSTEROTT 404 11/25/2024    TESTOSTEROTT 361 04/22/2024    TESTFRE 7.3 11/25/2024    TESTFRE 7.2 04/22/2024    TESTFRE 11.9 01/22/2024    PSA <0.014 11/25/2024    ESTRADIOL 39.1 04/17/2025       Lab Results   Component Value Date    SEXMONB 15.5 (L) 07/27/2023    TESTOSTEROTT 334 04/17/2025    TESTFRE 7.3 11/25/2024         Radiographic Studies  No Images in the past 120 days found..    I have reviewed the above labs and imaging.     Assessment / Plan      Diagnoses and all orders for this visit:    1. Hypogonadism in male (Primary)  -     Testosterone; Future  -     Hemoglobin & Hematocrit, Blood; Future  -     " PSA DIAGNOSTIC    2. Prostate disorder  -     PSA DIAGNOSTIC       Assessment & Plan  1. Testosterone management  - Hematocrit levels consistently elevated (54 to 58) over the past 9 months, indicating significant health risk  - Goal: Maintain stable testosterone levels while preventing polycythemia  - Discussed various forms of testosterone replacement therapy (injections, Xyosted injection, oral pills, gels, topical patches, Testopel)  - Prescription refill for testosterone gel provided today  - Due to significant polycythemia on Testopel would recommend continuing gel to evaluate if polycythemia resolves  - Advised to apply gel on different areas of the body each day and monitor for any decrease in sweating (inside underarm, inner thigh, behind testicles)  - Total testosterone, hematocrit and hemoglobin ordered for 6 months, lab orders printed and given to patient, he is informed to bring results with him next visit.  2. Polycythemia  - Hematocrit levels significantly elevated (up to 58), posing risk for stroke, heart attack, and blood clots  - Despite monthly blood donations for the last 9 months patient has continued to have significant polycythemia  - Labs ordered today to check PSA, hematocrit, and hemoglobin levels  -We discussed gel form of testosterone is least likely to cause polycythemia.  I recommend we continue for at least 6 more months to see if this improves polycythemia    3. Prostate cancer surveillance  - History of prostatectomy  - Last PSA test on record from 11/2024 despite patient getting PSAs with PCP  - PSA test to be conducted today to ensure it remains at a low level  - Advised to bring a copy of lab results to next appointment    Follow-up  - Follow-up visit scheduled in 6 months       Return in about 6 months (around 1/17/2026) for Pedro Armando, MSN, APRN, FNP-C  [facility]

## 2025-07-18 LAB
ESTRADIOL SERPL-MCNC: 69.1 PG/ML (ref 7.6–42.6)
HCT VFR BLD AUTO: 56.3 % (ref 37.5–51)
HGB BLD-MCNC: 17.5 G/DL (ref 13–17.7)
PSA SERPL-MCNC: <0.014 NG/ML (ref 0–4)
TESTOST SERPL-MCNC: 502 NG/DL (ref 264–916)

## 2025-07-31 ENCOUNTER — OFFICE VISIT (OUTPATIENT)
Dept: PULMONOLOGY | Facility: CLINIC | Age: 75
End: 2025-07-31
Payer: MEDICARE

## 2025-07-31 VITALS
SYSTOLIC BLOOD PRESSURE: 128 MMHG | WEIGHT: 222 LBS | BODY MASS INDEX: 30.07 KG/M2 | RESPIRATION RATE: 16 BRPM | HEART RATE: 72 BPM | DIASTOLIC BLOOD PRESSURE: 84 MMHG | HEIGHT: 72 IN | OXYGEN SATURATION: 97 %

## 2025-07-31 DIAGNOSIS — G47.33 OSA (OBSTRUCTIVE SLEEP APNEA): Primary | ICD-10-CM

## 2025-07-31 DIAGNOSIS — J30.89 OTHER ALLERGIC RHINITIS: ICD-10-CM

## 2025-07-31 PROCEDURE — 3079F DIAST BP 80-89 MM HG: CPT | Performed by: INTERNAL MEDICINE

## 2025-07-31 PROCEDURE — 3074F SYST BP LT 130 MM HG: CPT | Performed by: INTERNAL MEDICINE

## 2025-07-31 PROCEDURE — 99214 OFFICE O/P EST MOD 30 MIN: CPT | Performed by: INTERNAL MEDICINE

## 2025-07-31 NOTE — PROGRESS NOTES
"  Chief Complaint   Patient presents with    Sleeping Problem    Follow-up       Subjective   José Miguel Jerry is a 75 y.o. male.   Patient was evaluated today for follow up of Sleep apnea and allergic rhinitis.     Patient doesn't report any issues with the PAP device. The patient describes no significant issues with his mask either.     Patient says that the compliance with the use of the equipment is good.     Patient says that his symptoms of fatigue & daytime sleepiness have been helped greatly with the use of PAP, as prescribed.     Patient says that he has been using the prescribed nasal sprays on a regular basis but continues to have worsening nasal congestion as well as occasional runny nose.    The following portions of the patient's history were reviewed and updated as appropriate: allergies, current medications, past family history, past medical history, past social history, and past surgical history.    Review of Systems   HENT:  Negative for sinus pressure, sneezing and sore throat.    Respiratory:  Negative for cough, chest tightness, shortness of breath and wheezing.        Objective   Visit Vitals  /84   Pulse 72   Resp 16   Ht 182.9 cm (72\") Comment: pt reported   Wt 101 kg (222 lb)   SpO2 97%   BMI 30.11 kg/m²       BMI Readings from Last 8 Encounters:   07/31/25 30.11 kg/m²   07/17/25 29.30 kg/m²   04/17/25 29.30 kg/m²   03/12/25 29.39 kg/m²   02/27/25 29.35 kg/m²   12/12/24 27.71 kg/m²   12/04/24 27.71 kg/m²   11/04/24 29.16 kg/m²       Physical Exam  Vitals reviewed.   Constitutional:       Appearance: He is well-developed.   HENT:      Head: Atraumatic.      Mouth/Throat:      Comments: Oropharynx was crowded.  Neurological:      Mental Status: He is alert and oriented to person, place, and time.           Assessment & Plan   Diagnoses and all orders for this visit:    1. TULIO (obstructive sleep apnea) (Primary)  -     Ambulatory Referral to ENT (Otolaryngology)    2. Other allergic " rhinitis           Return in about 7 months (around 2/28/2026) for Crissy/Shyla.    DISCUSSION (if any):  Patient was advised to start using nasal spray on a regular basis, especially since his symptoms are consistent with likely poorly controlled allergic rhinitis.    If his symptoms do not improve, then we will consider ordering IgE/RAST panel.    Sleep study performed in 11/2018  AHI was 15 / hour.     Latest PAP device provided in April 2022  DME company: [employer]    Current PAP settings: 14/5  Current mask type: modified FFM    Sleep hygiene measures were discussed    Compliance data will be obtained from the his device/DME company.    Continue PAP device on a regular basis, at least 4 hours or more per night.    Since the patient seems to be somewhat uncomfortable with the CPAP despite the fact that it does seem to improve his overall symptoms, and he is requesting to be evaluated for inspire device, I have requested evaluation by ENT at .    I told the patient that I am not entirely certain if he is a good candidate or will benefit from inspire but we will wait for the assessment to be completed by ENT.      Dictated utilizing Dragon dictation.    This document was electronically signed by Salome Benz MD on 07/31/25 at 11:04 EDT

## 2025-08-22 DIAGNOSIS — E29.1 HYPOGONADISM IN MALE: ICD-10-CM

## 2025-08-25 DIAGNOSIS — E29.1 HYPOGONADISM IN MALE: ICD-10-CM

## 2025-08-25 RX ORDER — TESTOSTERONE GEL, 1% 10 MG/G
50 GEL TRANSDERMAL DAILY
Qty: 30 EACH | Refills: 0 | OUTPATIENT
Start: 2025-08-25 | End: 2025-09-24

## 2025-08-25 RX ORDER — TESTOSTERONE GEL, 1% 10 MG/G
50 GEL TRANSDERMAL DAILY
Qty: 30 EACH | Refills: 0 | Status: SHIPPED | OUTPATIENT
Start: 2025-08-25 | End: 2025-09-24

## (undated) DEVICE — SUT PDS 0 CT2 27IN DYED Z334H

## (undated) DEVICE — ASSEMBLY KIT: Brand: TITAN

## (undated) DEVICE — SUT MNCRYL 4/0 PC3 18IN Y845G

## (undated) DEVICE — PENCL E/S HNDSWCH PUSHBTN HOLSTR 10FT

## (undated) DEVICE — BNDG GZ SOF-FORM CONFRM 3X75IN LF STRL

## (undated) DEVICE — SUT VIC 0 UR6 27IN VCP603H

## (undated) DEVICE — PATIENT RETURN ELECTRODE, SINGLE-USE, CONTACT QUALITY MONITORING, ADULT, WITH 9FT CORD, FOR PATIENTS WEIGING OVER 33LBS. (15KG): Brand: MEGADYNE

## (undated) DEVICE — 3M™ IOBAN™ 2 ANTIMICROBIAL INCISE DRAPE 6650EZ: Brand: IOBAN™ 2

## (undated) DEVICE — SYR LUERLOK 30CC

## (undated) DEVICE — SUT SILK 2/0 TIES 18IN A185H

## (undated) DEVICE — DECANT BG O JET

## (undated) DEVICE — GLV SURG SENSICARE PI MIC PF SZ8.5 LF STRL

## (undated) DEVICE — FLEXIBLE YANKAUER,MEDIUM TIP, NO VACUUM CONTROL: Brand: ARGYLE

## (undated) DEVICE — MAYO STAND COVER: Brand: CONVERTORS

## (undated) DEVICE — INTRO SHEATH PRELUDE IDEAL SPRNG COIL 021 6F 23X80CM

## (undated) DEVICE — ENDOPATH XCEL UNIVERSAL TROCAR STABLILITY SLEEVES: Brand: ENDOPATH XCEL

## (undated) DEVICE — APPL DURAPREP IODOPHOR APL 26ML

## (undated) DEVICE — GW INQWIRE FC PTFE STD J/1.5 .035 260

## (undated) DEVICE — ENDOCUT SCISSOR TIP, DISPOSABLE: Brand: RENEW

## (undated) DEVICE — RICH MAJOR PROCEDURE: Brand: MEDLINE INDUSTRIES, INC.

## (undated) DEVICE — GLV SURG SENSICARE W/ALOE PF LF 7 STRL

## (undated) DEVICE — CATH DIAG EXPO M/ PK 5F FL4/FR4 PIG

## (undated) DEVICE — NDL HYPO ECLPS SFTY 23G 1IN

## (undated) DEVICE — BANDAGE,GAUZE,BULKEE II,4.5"X4.1YD,STRL: Brand: MEDLINE

## (undated) DEVICE — DRN JP FLT NO TROC SIL FUL/PERF 7MM

## (undated) DEVICE — RETR STAY HK ELAS BLNT 12MM 50PK

## (undated) DEVICE — GLV SURG SENSICARE PI LF PF 7.5 GRN STRL

## (undated) DEVICE — SUT ETHLN 3/0 FS1 663G

## (undated) DEVICE — MODEL AT P65, P/N 701554-001KIT CONTENTS: HAND CONTROLLER, 3-WAY HIGH-PRESSURE STOPCOCK WITH ROTATING END AND PREMIUM HIGH-PRESSURE TUBING: Brand: ANGIOTOUCH® KIT

## (undated) DEVICE — MODEL BT2000 P/N 700287-012KIT CONTENTS: MANIFOLD WITH SALINE AND CONTRAST PORTS, SALINE TUBING WITH SPIKE AND HAND SYRINGE, TRANSDUCER: Brand: BT2000 AUTOMATED MANIFOLD KIT

## (undated) DEVICE — GLV SURG SENSICARE W/ALOE PF LF SZ6 STRL

## (undated) DEVICE — ENDOPATH XCEL BLADELESS TROCARS WITH STABILITY SLEEVES: Brand: ENDOPATH XCEL

## (undated) DEVICE — DEV COMP RAD PRELUDESYNC 24CM

## (undated) DEVICE — MARKR UTIL W/RULR W/LBL REGTP STRL

## (undated) DEVICE — Device

## (undated) DEVICE — SOL NACL 0.9PCT 1000ML

## (undated) DEVICE — 3M™ STERI-DRAPE™ FLUOROSCOPE DRAPE, 10 PER CARTON / 4 CARTONS PER CASE, 1012: Brand: STERI-DRAPE™

## (undated) DEVICE — ENDOPOUCH RETRIEVER SPECIMEN RETRIEVAL BAGS: Brand: ENDOPOUCH RETRIEVER

## (undated) DEVICE — SHEET,DRAPE,70X100,STERILE: Brand: MEDLINE

## (undated) DEVICE — RESERVOIR,SUCTION,100CC,SILICONE: Brand: MEDLINE

## (undated) DEVICE — PK LAP LASR CHOLE 10

## (undated) DEVICE — 30977 SEE SHARP - ENHANCED INTRAOPERATIVE LAPAROSCOPE CLEANING & DEFOGGING: Brand: 30977 SEE SHARP - ENHANCED INTRAOPERATIVE LAPAROSCOPE CLEANING & DEFOGGING

## (undated) DEVICE — GLV SURG SENSICARE PI MIC PF SZ8 LF STRL

## (undated) DEVICE — SUT VIC 3/0 SH 27IN J416H

## (undated) DEVICE — SYR 10ML

## (undated) DEVICE — SPNG GZ WOVN 4X4IN 12PLY 10/BX STRL

## (undated) DEVICE — SUT PDS 2/0 CT2 27IN Z333H

## (undated) DEVICE — OCCLUSIVE GAUZE STRIP,3% BISMUTH TRIBROMOPHENATE IN PETROLATUM BLEND: Brand: XEROFORM

## (undated) DEVICE — GLV SURG SENSICARE W/ALOE PF LF 6.5 STRL

## (undated) DEVICE — CATH DIAG EXPO .045 FL3  5F 100CM

## (undated) DEVICE — SUP ATHL SUSP BOWER/BLACK PCH/POLY STRP/ELAS M/XLG WHT

## (undated) DEVICE — GLV SURG SENSICARE W/ALOE PF LF 7.5 STRL

## (undated) DEVICE — PK CATH CARD 10

## (undated) DEVICE — SKIN AFFIX SURG ADHESIVE 72/CS 0.55ML: Brand: MEDLINE

## (undated) DEVICE — DRSNG GZ PETROLTM XEROFORM CURAD 1X8IN STRL

## (undated) DEVICE — SUT MNCRYL PLS ANTIB UD 4/0 PS2 18IN

## (undated) DEVICE — SYR LUER/TIP MONOJECT RGD/PK 60CC STRL

## (undated) DEVICE — GOWN,PREVENTION PLUS,XXLARGE,STERILE: Brand: MEDLINE

## (undated) DEVICE — CATHETER,FOLEY,100%SILICONE,16FR,10ML,LF: Brand: MEDLINE

## (undated) DEVICE — MARKR SKIN W/RULR

## (undated) DEVICE — [HIGH FLOW INSUFFLATOR,  DO NOT USE IF PACKAGE IS DAMAGED,  KEEP DRY,  KEEP AWAY FROM SUNLIGHT,  PROTECT FROM HEAT AND RADIOACTIVE SOURCES.]: Brand: PNEUMOSURE